# Patient Record
Sex: FEMALE | Race: WHITE | NOT HISPANIC OR LATINO | Employment: FULL TIME | URBAN - METROPOLITAN AREA
[De-identification: names, ages, dates, MRNs, and addresses within clinical notes are randomized per-mention and may not be internally consistent; named-entity substitution may affect disease eponyms.]

---

## 2017-01-20 ENCOUNTER — APPOINTMENT (OUTPATIENT)
Dept: PLASTIC SURGERY | Facility: IMAGING CENTER | Age: 47
End: 2017-01-20

## 2017-05-01 ENCOUNTER — HOSPITAL ENCOUNTER (EMERGENCY)
Facility: MEDICAL CENTER | Age: 47
End: 2017-05-01
Attending: EMERGENCY MEDICINE
Payer: COMMERCIAL

## 2017-05-01 ENCOUNTER — APPOINTMENT (OUTPATIENT)
Dept: RADIOLOGY | Facility: MEDICAL CENTER | Age: 47
End: 2017-05-01
Attending: EMERGENCY MEDICINE
Payer: COMMERCIAL

## 2017-05-01 ENCOUNTER — NON-PROVIDER VISIT (OUTPATIENT)
Dept: OCCUPATIONAL MEDICINE | Facility: CLINIC | Age: 47
End: 2017-05-01
Payer: COMMERCIAL

## 2017-05-01 VITALS
RESPIRATION RATE: 14 BRPM | SYSTOLIC BLOOD PRESSURE: 136 MMHG | DIASTOLIC BLOOD PRESSURE: 86 MMHG | BODY MASS INDEX: 30.12 KG/M2 | HEIGHT: 63 IN | HEART RATE: 100 BPM | WEIGHT: 170 LBS | OXYGEN SATURATION: 96 %

## 2017-05-01 DIAGNOSIS — S60.221A CONTUSION OF RIGHT HAND, INITIAL ENCOUNTER: ICD-10-CM

## 2017-05-01 DIAGNOSIS — Z02.1 PRE-EMPLOYMENT DRUG SCREENING: ICD-10-CM

## 2017-05-01 DIAGNOSIS — Z02.83 ENCOUNTER FOR DRUG SCREENING: ICD-10-CM

## 2017-05-01 PROCEDURE — 700102 HCHG RX REV CODE 250 W/ 637 OVERRIDE(OP): Performed by: EMERGENCY MEDICINE

## 2017-05-01 PROCEDURE — A9270 NON-COVERED ITEM OR SERVICE: HCPCS | Performed by: EMERGENCY MEDICINE

## 2017-05-01 PROCEDURE — 80305 DRUG TEST PRSMV DIR OPT OBS: CPT | Performed by: INTERNAL MEDICINE

## 2017-05-01 PROCEDURE — 73130 X-RAY EXAM OF HAND: CPT | Mod: RT

## 2017-05-01 PROCEDURE — 99026 IN-HOSPITAL ON CALL SERVICE: CPT | Performed by: INTERNAL MEDICINE

## 2017-05-01 PROCEDURE — 82075 ASSAY OF BREATH ETHANOL: CPT | Performed by: INTERNAL MEDICINE

## 2017-05-01 PROCEDURE — 99283 EMERGENCY DEPT VISIT LOW MDM: CPT

## 2017-05-01 RX ORDER — IBUPROFEN 600 MG/1
600 TABLET ORAL ONCE
Status: COMPLETED | OUTPATIENT
Start: 2017-05-01 | End: 2017-05-01

## 2017-05-01 RX ADMIN — IBUPROFEN 600 MG: 600 TABLET, FILM COATED ORAL at 15:51

## 2017-05-01 ASSESSMENT — PAIN SCALES - GENERAL: PAINLEVEL_OUTOF10: ASSUMED PAIN PRESENT

## 2017-05-01 NOTE — LETTER
"  FORM C-4:  EMPLOYEE’S CLAIM FOR COMPENSATION/ REPORT OF INITIAL TREATMENT  EMPLOYEE’S CLAIM - PROVIDE ALL INFORMATION REQUESTED   First Name  Nancie Last Name  Tram Birthdate             Age  1970 46 y.o. 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 (if applicable)  Pushing a ventilator    How did this injury or occupational disease occur? Be specific and answer in detail. Use additional sheet if necessary)  ER Tech was pushing a bed too close to vent and we hit the doorway   If you believe that you have an occupational disease, when did you first have knowledge of the disability and it relationship to your employment?  N/A Witnesses to the Accident  Grant Jones RN     Nature of Injury or Occupational Disease  Contusion  Part(s) of Body Injured or Affected  Hand (R), N/A, N/A    I certify that the above is true and correct to the best of my knowledge and that I have provided this information in order to obtain " the benefits of [place]  I hereby authorize any physician, chiropractor, surgeon, practitioner, or other person, any hospital, including [facility] hospital, any medical service organization, any insurance company, or other institution or organization to release to each other, any medical or other information, including benefits paid or payable, pertinent to this injury or disease, except information relative to diagnosis, treatment and/or counseling for AIDS, psychological conditions, alcohol or controlled substances, for which I must give specific authorization.  A Photostat of this authorization shall be as valid as the original.   Date 05/01/2017 [facility]   Employee’s Signature   THIS REPORT MUST BE COMPLETED AND MAILED WITHIN 3 WORKING DAYS OF TREATMENT   Place  [facility], EMERGENCY DEPT  Name of Facility   [facility]   Date  5/1/2017 Diagnosis  (S60.221A) Contusion of right hand, initial encounter Is there evidence the injured employee was under the influence of alcohol and/or another controlled substance at the time of accident?   Hour  4:54 PM Description of Injury or Disease  Contusion of right hand, initial encounter No   Treatment  Motrin, ice  Have you advised the patient to remain off work five days or more?         No   X-Ray Findings  Negative  Comments:hand x-ray negative for fracture   If Yes   From Date    To Date      From information given by the employee, together with medical evidence, can you directly connect this injury or occupational disease as job incurred?  Yes If No, is the employee capable of: Full Duty  No Modified Duty  Yes   Is additional medical care by a physician indicated?  Yes If Modified Duty, Specify any Limitations / Restrictions  No lifting greater than 5 pounds right hand     Do you know  "of any previous injury or disease contributing to this condition or occupational disease?  No   Date  5/1/2017 Print Doctor’s Name  Joel Nielsen certify the employer’s copy of this form was mailed on:   Address  [address] [address] [phone] Insurer’s Use Only   [facility]  [address]    Provider’s Tax ID Number  [id number] Telephone  Dept: [phone]    Doctor’s Signature  e-JOEL Martell D.O. Degree  M.D.    Original - TREATING PHYSICIAN OR CHIROPRACTOR   Pg 2-Insurer/TPA   Pg 3-Employer   Pg 4-Employee                                                                                                  Form C-4 (rev01/03)     BRIEF DESCRIPTION OF RIGHTS AND BENEFITS  (Pursuant to [place] 616C.[place])    Notice of Injury or Occupational Disease (Incident Report Form C-1): If an injury or occupational disease (OD) arises out of and in the course of employment, you must provide written notice to your employer as soon as practicable, but no later than 7 days after the accident or OD. Your employer shall maintain a sufficient supply of the required forms.    Claim for Compensation (Form C-4): If medical treatment is sought, the form C-4 is available at the place of initial treatment. A completed \"Claim for Compensation\" (Form C-4) must be filed within 90 days after an accident or OD. The treating physician or chiropractor must, within 3 working days after treatment, complete and mail to the employer, the employer's insurer and third-party , the Claim for Compensation.    Medical Treatment: If you require medical treatment for your on-the-job injury or OD, you may be required to select a physician or chiropractor from a list provided by your workers’ compensation insurer, if it has contracted with an Organization for Managed Care (MCO) or Preferred Provider Organization (PPO) or providers of health care. If your employer has not entered into a contract " with an MCO or PPO, you may select a physician or chiropractor from the Panel of Physicians and Chiropractors. Any medical costs related to your industrial injury or OD will be paid by your insurer.    Temporary Total Disability (TTD): If your doctor has certified that you are unable to work for a period of at least 5 consecutive days, or 5 cumulative days in a 20-day period, or places restrictions on you that your employer does not accommodate, you may be entitled to TTD compensation.    Temporary Partial Disability (TPD): If the wage you receive upon reemployment is less than the compensation for TTD to which you are entitled, the insurer may be required to pay you TPD compensation to make up the difference. TPD can only be paid for a maximum of 24 months.    Permanent Partial Disability (PPD): When your medical condition is stable and there is an indication of a PPD as a result of your injury or OD, within 30 days, your insurer must arrange for an evaluation by a rating physician or chiropractor to determine the degree of your PPD. The amount of your PPD award depends on the date of injury, the results of the PPD evaluation and your age and wage.    Permanent Total Disability (PTD): If you are medically certified by a treating physician or chiropractor as permanently and totally disabled and have been granted a PTD status by your insurer, you are entitled to receive monthly benefits not to exceed 66 2/3% of your average monthly wage. The amount of your PTD payments is subject to reduction if you previously received a PPD award.    Vocational Rehabilitation Services: You may be eligible for vocational rehabilitation services if you are unable to return to the job due to a permanent physical impairment or permanent restrictions as a result of your injury or occupational disease.    Transportation and Per Andrea Reimbursement: You may be eligible for travel expenses and per andrea associated with medical  treatment.  Reopening: You may be able to reopen your claim if your condition worsens after claim closure.    Appeal Process: If you disagree with a written determination issued by the insurer or the insurer does not respond to your request, you may appeal to the Department of Administration, , by following the instructions contained in your determination letter. You must appeal the determination within 70 days from the date of the determination letter at [address], [address], [place], [place] [address], or [address], [place], [place] [address]. If you disagree with the  decision, you may appeal to the Department of Administration, . You must file your appeal within 30 days from the date of the  decision letter at [address], [address], [place], [place] [address], or [address], [place], [place] [address]. If you disagree with a decision of an , you may file a petition for judicial review with the District Court. You must do so within 30 days of the Appeal Officer’s decision. You may be represented by an  at your own expense or you may contact the [facility] for possible representation.    [place]  for Injured Workers (NAIW): If you disagree with a  decision, you may request that NAIW represent you without charge at an  Hearing. For information regarding denial of benefits, you may contact the [facility] at: [address], [address], [place] [address], [phone]) [phone], or [address], [address], [place] [address], [phone]    To File a Complaint with the Division: If you wish to file a complaint with the  of the Division of Industrial Relations (DIR), please contact the Workers’ Compensation Section, [address], [address], [place], [place] [address], telephone (223) 174-9231, or [address], [address], [place], [place] [address], telephone (974) 186-4348.    For assistance with Workers’ Compensation Issues: you may contact the Office of the Governor Consumer Health Assistance, [address] [place] [address], [address], [place], [place] [address], Toll Free 1-631.980.6246, Web site: http://Netshow.me.[facility].nv., E-mail [email or web address].[email or web address].                                                                                                                                                                               __________________________________________________________________                                    _________________            Employee Name / Signature                                                                                                                            Date                                       D-2 (rev. 10/07)

## 2017-05-01 NOTE — ED PROVIDER NOTES
ED Provider Note    Scribed for Seamus Nielsen D.O. by Octavio Teran. 5/1/2017  3:08 PM    Primary care provider: JANE Bueno  Means of arrival: Private vehicle  History obtained from: Patient  History limited by: None    CHIEF COMPLAINT  Chief Complaint   Patient presents with   • T-5000     hand injury     HPI  Nancie Ugalde is a 46 y.o. female who presents to the Emergency Department complaining of constant hand pain after jamming her hand between a wall and a hospital bed onset 40 minutes prior to arrival. The patient has associated bruising. She denies any loss of sensation. This is a work related injury and the patient works here. Patient had a bag of ice at bedside.    REVIEW OF SYSTEMS  Pertinent positives include right hand pain, bruising, and trauma. Pertinent negatives include no loss of sensation.    E    PAST MEDICAL HISTORY  Past Medical History   Diagnosis Date   • Hypertension    • Indigestion    • Asthma    • Disorder of thyroid    • Gynecological disorder      heavy periods     SURGICAL HISTORY  Past Surgical History   Procedure Laterality Date   • Other abdominal surgery       gallbladder, appendix   • Gyn surgery     • Tubal ligation     • Hysterectomy laparoscopy Bilateral 9/21/2016     Procedure: HYSTERECTOMY LAPAROSCOPY bilateral salpingectomy;  Surgeon: Nba Benitez M.D.;  Location: SURGERY SAME DAY [facility] ORS;  Service:    • Vaginal suspension N/A 9/21/2016     Procedure: UTEROSACRAL VAULT SUSPENSION;  Surgeon: Nba Benitez M.D.;  Location: SURGERY SAME DAY [facility] ORS;  Service:    • Cystoscopy N/A 9/21/2016     Procedure: CYSTOSCOPY;  Surgeon: Nba Benitez M.D.;  Location: SURGERY SAME DAY [facility] ORS;  Service:       SOCIAL HISTORY  Social History   Substance Use Topics   • Smoking status: Former Smoker   • Smokeless tobacco: Never Used   • Alcohol Use: Yes      Comment: 2-3 per week      History   Drug Use No     FAMILY HISTORY  No  "pertinent family history noted.    CURRENT MEDICATIONS  Home Medications     Reviewed by Dorita Gutierrez R.N. (Registered Nurse) on 05/01/17 at 1454  Med List Status: Complete    Medication Last Dose Status    ibuprofen (MOTRIN) 600 MG Tab 9/20/2016 Active    losartan (COZAAR) 50 MG Tab 9/20/2016 Active    thyroid (ARMOUR THYROID) 60 MG Tab 9/20/2016 Active              ALLERGIES  Allergies   Allergen Reactions   • Nkda [No Known Drug Allergy]      PHYSICAL EXAM  VITAL SIGNS: /86 mmHg  Pulse 100  Resp 14  Ht 1.6 m (5' 3\")  Wt 77.111 kg (170 lb)  BMI 30.12 kg/m2  SpO2 96%    Skin: Warm, Dry, No erythema, No rash. 2-3 mm ecchymotic lesion to dorsum of second metacarpal of right hand.   Extremities: No edema, No evidence of deformity, Full range of motion, 2-3 mm ecchymotic lesion to dorsum of second metacarpal of right hand.  Neurologic: Alert & oriented x 3,  No focal deficits noted, normal gait. Radial/ulnar and median nerve intact. Strength and sensation intact.    DIAGNOSTIC STUDIES/PROCEDURES    RADIOLOGY  DX-HAND 3+ RIGHT   Final Result         No acute osseous abnormality.        The radiologist's interpretation of all radiological studies have been reviewed by me.    COURSE & MEDICAL DECISION MAKING  Nursing notes, VS, PMSFHx reviewed in chart.    3:08 PM - Patient seen and examined at bedside. She agrees to imaging and use of Motrin for pain management. Patient will be treated with Motrin tablet 600 mg. Ordered DX hand 3+ right to evaluate her symptoms.    5:10 PM Review of imaging reveals no acute abnormality.    5:10 PM - Re-examined; The patient is resting in bed comfortably. I discussed her above findings were overall unremarkable and plans for discharge with an information sheet on hand injuries. She was given a referral to [facility] as well as Dr. Gaming, Orthopedics, and instructed to return to the ED if her symptoms worsen. Patient understands and agrees. Her vitals prior " "to discharge are: /86 mmHg  Pulse 100  Resp 14  Ht 1.6 m (5' 3\")  Wt 77.111 kg (170 lb)  BMI 30.12 kg/m2  SpO2 96%    This is a charming 46 y.o. female that presents with right hand contusion. X-rays completed for possible fracture is negative. This point, the patient has no evidence of fracture although I cannot really exclude occult fracture. For this reason she is to follow-up with Workmen's Compensation as well as Dr. Gaming for further evaluation and management if she continues to have pain in one week. Strict return precautions have been given.    DISPOSITION:  Patient will be discharged home in stable condition.    FOLLOW UP:  [facility]  [address] [facility] [address] [facility] [address] [address]  [phone]    Schedule an appointment as soon as possible for a visit in 1 day      [facility], Emergency Dept  [address] [id number] [phone]    If symptoms worsen    Forest Gaming M.D.  [address]  [address]  [place] [address]  [phone]    Schedule an appointment as soon as possible for a visit in 1 week  As needed    FINAL IMPRESSION  1. Contusion of right hand, initial encounter        IOctavio (Scribe), am scribing for, and in the presence of, Seamus Nielsen D.O.    Electronically signed by: Octavio Teran (Scribe), 5/1/2017    ISeamus D.O. personally performed the services described in this documentation, as scribed by Octavio Teran in my presence, and it is both accurate and complete.    The note accurately reflects work and decisions made by me.  Seamus Nielsen  5/1/2017  6:36 PM                "

## 2017-05-01 NOTE — ED AVS SNAPSHOT
Home Care Instructions                                                                                                                Nancie Ugalde   MRN: [id number]    Department:  [facility], Emergency Dept   Date of Visit:  5/1/2017            [facility], Emergency Dept    [address] [place] [address]    Phone:  [phone]      You were seen by     Seamus Nielsen D.O.      Your Diagnosis Was     Contusion of right hand, initial encounter     S60.221A       These are the medications you received during your hospitalization from 05/01/2017 1445 to 05/01/2017 1705     Date/Time Order Dose Route Action    05/01/2017 1551 ibuprofen (MOTRIN) tablet 600 mg 600 mg Oral Given      Follow-up Information     1. Follow up with [facility]. Schedule an appointment as soon as possible for a visit in 1 day.    Contact information    [address] [facility] [address] [phone]          2. Follow up with [facility], Emergency Dept.    Specialty:  Emergency Medicine    Why:  If symptoms worsen    Contact information    [address] [facility] [address] [phone]        3. Follow up with [employer] M.D.. Schedule an appointment as soon as possible for a visit in 1 week.    Specialty:  Orthopaedics    Why:  As needed    Contact information    [address]  [address]  [facility] [address] [phone]        Medication Information     Review all of your home medications and newly ordered medications with your primary doctor and/or pharmacist as soon as possible. Follow medication instructions as directed by your doctor and/or pharmacist.     Please keep your complete medication list with you and share with your physician. Update the information when medications are discontinued, doses are changed, or new medications (including over-the-counter products) are added; and carry medication information at all times in the event of  emergency situations.               Medication List      ASK your doctor about these medications        Instructions    Morning Afternoon Evening Bedtime    ARMOUR THYROID 60 MG Tabs   Generic drug:  thyroid        Take 60 mg by mouth every day.   Dose:  60 mg                        ibuprofen 600 MG Tabs   Last time this was given:  600 mg on 5/1/2017  3:51 PM   Commonly known as:  MOTRIN        Take 1 Tab by mouth every 6 hours as needed.   Dose:  600 mg                        losartan 50 MG Tabs   Commonly known as:  COZAAR        Take 50 mg by mouth every day.   Dose:  50 mg                                Procedures and tests performed during your visit     DX-HAND 3+ RIGHT        Discharge Instructions       Hand Contusion  A hand contusion is a deep bruise on your hand area. Contusions are the result of an injury that caused bleeding under the skin. The contusion may turn blue, purple, or yellow. Minor injuries will give you a painless contusion, but more severe contusions may stay painful and swollen for a few weeks.  CAUSES   A contusion is usually caused by a blow, trauma, or direct force to an area of the body.  SYMPTOMS   · Swelling and redness of the injured area.  · Discoloration of the injured area.  · Tenderness and soreness of the injured area.  · Pain.  DIAGNOSIS   The diagnosis can be made by taking a history and performing a physical exam. An X-ray, CT scan, or MRI may be needed to determine if there were any associated injuries, such as broken bones (fractures).  TREATMENT   Often, the best treatment for a hand contusion is resting, elevating, icing, and applying cold compresses to the injured area. Over-the-counter medicines may also be recommended for pain control.  HOME CARE INSTRUCTIONS   · Put ice on the injured area.  ¨ Put ice in a plastic bag.  ¨ Place a towel between your skin and the bag.  ¨ Leave the ice on for 15-20 minutes, 03-04 times a day.  · Only take over-the-counter or  prescription medicines as directed by your caregiver. Your caregiver may recommend avoiding anti-inflammatory medicines (aspirin, ibuprofen, and naproxen) for 48 hours because these medicines may increase bruising.  · If told, use an elastic wrap as directed. This can help reduce swelling. You may remove the wrap for sleeping, showering, and bathing. If your fingers become numb, cold, or blue, take the wrap off and reapply it more loosely.  · Elevate your hand with pillows to reduce swelling.  · Avoid overusing your hand if it is painful.  SEEK IMMEDIATE MEDICAL CARE IF:   · You have increased redness, swelling, or pain in your hand.  · Your swelling or pain is not relieved with medicines.  · You have loss of feeling in your hand or are unable to move your fingers.  · Your hand turns cold or blue.  · You have pain when you move your fingers.  · Your hand becomes warm to the touch.  · Your contusion does not improve in 2 days.  MAKE SURE YOU:   · Understand these instructions.  · Will watch your condition.  · Will get help right away if you are not doing well or get worse.     This information is not intended to replace advice given to you by your health care provider. Make sure you discuss any questions you have with your health care provider.     Document Released: 06/09/2003 Document Revised: 09/11/2013 Document Reviewed: 06/10/2013  Elsevier Interactive Patient Education ©2016 Elsevier Inc.            Patient Information     Patient Information    Following emergency treatment: all patient requiring follow-up care must return either to a private physician or a clinic if your condition worsens before you are able to obtain further medical attention, please return to the emergency room.     Billing Information    At [facility], we work to make the billing process streamlined for our patients.  Our Representatives are here to answer any questions you may have regarding your hospital bill.  If you have insurance  coverage and have supplied your insurance information to us, we will submit a claim to your insurer on your behalf.  Should you have any questions regarding your bill, we can be reached online or by phone as follows:  Online: You are able pay your bills online or live chat with our representatives about any billing questions you may have. We are here to help Monday - Friday from 8:00am to 7:30pm and 9:00am - 12:00pm on Saturdays.  Please visit [email or web address]  for more information.   Phone:  [phone] or [phone]    Please note that your emergency physician, surgeon, pathologist, radiologist, anesthesiologist, and other specialists are not employed by [facility] and will therefore bill separately for their services.  Please contact them directly for any questions concerning their bills at the numbers below:     Emergency Physician Services:  [phone]  [facility]:  [phone]  [facility]:  [phone]  [facility]:  [phone]    1. Your final bill may vary from the amount quoted upon discharge if all procedures are not complete at that time, or if your doctor has additional procedures of which we are not aware. You will receive an additional bill if you return to the Emergency Department at [facility] for suture removal regardless of the facility of which the sutures were placed.     2. Please arrange for settlement of this account at the emergency registration.    3. All self-pay accounts are due in full at the time of treatment.  If you are unable to meet this obligation then payment is expected within 4-5 days.     4. If you have had radiology studies (CT, X-ray, Ultrasound, MRI), you have received a preliminary result during your emergency department visit. Please contact the radiology department [phone] to receive a copy of your final result. Please discuss the Final result with your primary  physician or with the follow up physician provided.     Crisis Hotline:  [place] Crisis Hotline:  0-125-PLKIDDH or 1-973.403.5506  [place] Crisis Hotline:    1-641.917.7322 or 429-879-3655         ED Discharge Follow Up Questions    1. In order to provide you with very good care, we would like to follow up with a phone call in the next few days.  May we have your permission to contact you?     YES /  NO    2. What is the best phone number to call you? (       )_____-__________    3. What is the best time to call you?      Morning  /  Afternoon  /  Evening                   Patient Signature:  ____________________________________________________________    Date:  ____________________________________________________________      Your appointments     May 03, 2017  6:00 AM   Adult Draw/Collection with LAB [facility]   [facility] - [facility] (--)    [address]  [facility] [address]   [phone]            Jul 06, 2017  8:00 AM   Pulmonary Function Test with PFT-RM3   [facility] (--)    [address] [place] [address] [address]  [facility] [id number] [phone]            Jul 06, 2017  9:00 AM   XRAY 15 with PULMONARY DX 1   [facility] - Pulmonary ([address])    [address] [place] [id number]               Jul 06, 2017 10:20 AM   New Patient Pulmonary with Forest Sorensen M.D.   [facility] (--)    [address] [place] [address]  [facility] [id number] [phone]

## 2017-05-01 NOTE — ED AVS SNAPSHOT
[employer] Access Code: Activation code not generated  Current [employer] Status: Active    [employer]  A secure, online tool to manage your health information     [employer]’s [employer]® is a secure, online tool that connects you to your personalized health information from the privacy of your home -- day or night - making it very easy for you to manage your healthcare. Once the activation process is completed, you can even access your medical information using the [employer] jacob, which is available for free in the Apple Jacob store or Google Play store.     [employer] provides the following levels of access (as shown below):   My Chart Features   [facility] Primary Care Doctor [facility]  Specialists [facility]  Urgent  Care Non-[facility]  Primary Care  Doctor   Email your healthcare team securely and privately 24/7 X X X X   Manage appointments: schedule your next appointment; view details of past/upcoming appointments X      Request prescription refills. X      View recent personal medical records, including lab and immunizations X X X X   View health record, including health history, allergies, medications X X X X   Read reports about your outpatient visits, procedures, consult and ER notes X X X X   See your discharge summary, which is a recap of your hospital and/or ER visit that includes your diagnosis, lab results, and care plan. X X       How to register for [employer]:  1. Go to  [email or web address].  2. Click on the Sign Up Now box, which takes you to the New Member Sign Up page. You will need to provide the following information:  a. Enter your [employer] Access Code exactly as it appears at the top of this page. (You will not need to use this code after you’ve completed the sign-up process. If you do not sign up before the expiration date, you must request a new code.)   b. Enter your date of birth.   c. Enter your home email address.   d. Click Submit, and follow the next screen’s instructions.  3. Create a [employer] ID. This will  be your [employer] login ID and cannot be changed, so think of one that is secure and easy to remember.  4. Create a [employer] password. You can change your password at any time.  5. Enter your Password Reset Question and Answer. This can be used at a later time if you forget your password.   6. Enter your e-mail address. This allows you to receive e-mail notifications when new information is available in [employer].  7. Click Sign Up. You can now view your health information.    For assistance activating your [employer] account, call [phone]

## 2017-05-01 NOTE — LETTER
"  FORM C-4:  EMPLOYEE’S CLAIM FOR COMPENSATION/ REPORT OF INITIAL TREATMENT  EMPLOYEE’S CLAIM - PROVIDE ALL INFORMATION REQUESTED   First Name  Nancie Last Name  Tram Birthdate             Age  1970 46 y.o. Sex  female Claim Number   Home Employee Address  [address]  [facility] [place]                                     Zip  [address] Height  1.6 m (5' 3\") Weight  77.111 kg (170 lb) [facility]  [id number]   Mailing Employee Address                           [address]   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 (if applicable)  Pushing a ventilator    How did this injury or occupational disease occur? Be specific and answer in detail. Use additional sheet if necessary)  ER Tech was pushing a bed too close to vent and we hit the doorway   If you believe that you have an occupational disease, when did you first have knowledge of the disability and it relationship to your employment?  N/A Witnesses to the Accident  Grant Jones RN     Nature of Injury or Occupational Disease  Contusion  Part(s) of Body Injured or Affected  Hand (R), N/A, N/A    I certify that the above is true and correct to the best of my knowledge and that I have provided this information in order to obtain the benefits of [place] " [place] or [place]).  I hereby authorize any physician, chiropractor, surgeon, practitioner, or other person, any hospital, including [facility] hospital, any medical service organization, any insurance company, or other institution or organization to release to each other, any medical or other information, including benefits paid or payable, pertinent to this injury or disease, except information relative to diagnosis, treatment and/or counseling for AIDS, psychological conditions, alcohol or controlled substances, for which I must give specific authorization.  A Photostat of this authorization shall be as valid as the original.   Date Place   Employee’s Signature   THIS REPORT MUST BE COMPLETED AND MAILED WITHIN 3 WORKING DAYS OF TREATMENT   Place  [facility], EMERGENCY DEPT  Name of Facility   [facility]   Date  5/1/2017 Diagnosis  No diagnosis found. Is there evidence the injured employee was under the influence of alcohol and/or another controlled substance at the time of accident?   Hour  4:28 PM Description of Injury or Disease       Treatment     Have you advised the patient to remain off work five days or more?             X-Ray Findings      If Yes   From Date    To Date      From information given by the employee, together with medical evidence, can you directly connect this injury or occupational disease as job incurred?    If No, is the employee capable of: Full Duty    Modified Duty      Is additional medical care by a physician indicated?    If Modified Duty, Specify any Limitations / Restrictions        Do you know of any previous injury or disease contributing to this condition or occupational disease?      Date  5/1/2017 Print Doctor’s Name  Seamus Nielsen I certify the employer’s copy of this form was mailed on:   Address  [address] [place] [address]  "[id number]  [phone] Insurer’s Use Only   [facility] Zip  [address]    Provider’s Tax ID Number  [id number] Telephone  Dept: [phone]    Doctor’s Signature    Degree       Original - TREATING PHYSICIAN OR CHIROPRACTOR   Pg 2-Insurer/TPA   Pg 3-Employer   Pg 4-Employee                                                                                                  Form C-4 (rev01/03)     BRIEF DESCRIPTION OF RIGHTS AND BENEFITS  (Pursuant to [place] 616[place])    Notice of Injury or Occupational Disease (Incident Report Form C-1): If an injury or occupational disease (OD) arises out of and in the course of employment, you must provide written notice to your employer as soon as practicable, but no later than 7 days after the accident or OD. Your employer shall maintain a sufficient supply of the required forms.    Claim for Compensation (Form C-4): If medical treatment is sought, the form C-4 is available at the place of initial treatment. A completed \"Claim for Compensation\" (Form C-4) must be filed within 90 days after an accident or OD. The treating physician or chiropractor must, within 3 working days after treatment, complete and mail to the employer, the employer's insurer and third-party , the Claim for Compensation.    Medical Treatment: If you require medical treatment for your on-the-job injury or OD, you may be required to select a physician or chiropractor from a list provided by your workers’ compensation insurer, if it has contracted with an Organization for Managed Care (MCO) or Preferred Provider Organization (PPO) or providers of health care. If your employer has not entered into a contract with an MCO or PPO, you may select a physician or chiropractor from the Panel of Physicians and Chiropractors. Any medical costs related to your industrial injury or OD will be paid by your insurer.    Temporary Total Disability (TTD): If your doctor has certified that you are unable " to work for a period of at least 5 consecutive days, or 5 cumulative days in a 20-day period, or places restrictions on you that your employer does not accommodate, you may be entitled to TTD compensation.    Temporary Partial Disability (TPD): If the wage you receive upon reemployment is less than the compensation for TTD to which you are entitled, the insurer may be required to pay you TPD compensation to make up the difference. TPD can only be paid for a maximum of 24 months.    Permanent Partial Disability (PPD): When your medical condition is stable and there is an indication of a PPD as a result of your injury or OD, within 30 days, your insurer must arrange for an evaluation by a rating physician or chiropractor to determine the degree of your PPD. The amount of your PPD award depends on the date of injury, the results of the PPD evaluation and your age and wage.    Permanent Total Disability (PTD): If you are medically certified by a treating physician or chiropractor as permanently and totally disabled and have been granted a PTD status by your insurer, you are entitled to receive monthly benefits not to exceed 66 2/3% of your average monthly wage. The amount of your PTD payments is subject to reduction if you previously received a PPD award.    Vocational Rehabilitation Services: You may be eligible for vocational rehabilitation services if you are unable to return to the job due to a permanent physical impairment or permanent restrictions as a result of your injury or occupational disease.    Transportation and Per Andrea Reimbursement: You may be eligible for travel expenses and per andrea associated with medical treatment.  Reopening: You may be able to reopen your claim if your condition worsens after claim closure.    Appeal Process: If you disagree with a written determination issued by the insurer or the insurer does not respond to your request, you may appeal to the Department of Administration,  , by following the instructions contained in your determination letter. You must appeal the determination within 70 days from the date of the determination letter at [address], [address], [place], [place] [address], or [address], [address], [place], [place] [address]. If you disagree with the  decision, you may appeal to the Department of Administration, . You must file your appeal within 30 days from the date of the  decision letter at [address], [address], [place], [place] [address], or [address], [address], [place], [place] [address]. If you disagree with a decision of an , you may file a petition for judicial review with the District Court. You must do so within 30 days of the Appeal Officer’s decision. You may be represented by an  at your own expense or you may contact the [facility] for possible representation.    [place]  for Injured Workers (NAIW): If you disagree with a  decision, you may request that NAIW represent you without charge at an  Hearing. For information regarding denial of benefits, you may contact the [facility] at: [address], [address], [place] [address], [phone], or [address], [place] [address], [phone]    To File a Complaint with the Division: If you wish to file a complaint with the  of the Division of Industrial Relations (DIR), please contact the Workers’ Compensation Section, [address], [address], [place], [place] [address], telephone [phone], or [address], [facility] [address], [place] [address], telephone [phone].    For assistance with Workers’ Compensation Issues: you may contact the Office of the Governor Consumer Health Assistance, [address], [address], [place], [place] [address], Toll Free 1-326.700.6260, Web  site: http://rosalinda..nv.us, E-mail negin@.nv.                                                                                                                                                                               __________________________________________________________________                                    _________________            Employee Name / Signature                                                                                                                            Date                                       D-2 (rev. 10/07)

## 2017-05-01 NOTE — ED AVS SNAPSHOT
5/1/2017    Nancie Ugalde  [address] [place] [address]    Dear Nancie:    [facility] wants to ensure your discharge home is safe and you or your loved ones have had all of your questions answered regarding your care after you leave the hospital.    Below is a list of resources and contact information should you have any questions regarding your hospital stay, follow-up instructions, or active medical symptoms.    Questions or Concerns Regarding… Contact   Medical Questions Related to Your Discharge  (7 days a week, 8am-5pm) Contact a Nurse Care Coordinator   [phone]   Medical Questions Not Related to Your Discharge  (24 hours a day / 7 days a week)  Contact the Nurse Health Line   [phone]    Medications or Discharge Instructions Refer to your discharge packet   or contact your [facility] Primary Care Provider   [phone]   Follow-up Appointment(s) Schedule your appointment via Gamerius   or contact Scheduling [phone]   Billing Review your statement via Gamerius  or contact Billing [phone]   Medical Records Review your records via Gamerius   or contact Medical Records [phone]     You may receive a telephone call within two days of discharge. This call is to make certain you understand your discharge instructions and have the opportunity to have any questions answered. You can also easily access your medical information, test results and upcoming appointments via the Gamerius free online health management tool. You can learn more and sign up at [email or web address]. For assistance setting up your Gamerius account, please call [phone].    Once again, we want to ensure your discharge home is safe and that you have a clear understanding of any next steps in your care. If you have any questions or concerns, please do not hesitate to contact us, we are here for you. Thank you for choosing [facility] for your healthcare needs.    Sincerely,    Your [facility] Healthcare Team

## 2017-05-01 NOTE — DISCHARGE INSTRUCTIONS
Hand Contusion  A hand contusion is a deep bruise on your hand area. Contusions are the result of an injury that caused bleeding under the skin. The contusion may turn blue, purple, or yellow. Minor injuries will give you a painless contusion, but more severe contusions may stay painful and swollen for a few weeks.  CAUSES   A contusion is usually caused by a blow, trauma, or direct force to an area of the body.  SYMPTOMS   · Swelling and redness of the injured area.  · Discoloration of the injured area.  · Tenderness and soreness of the injured area.  · Pain.  DIAGNOSIS   The diagnosis can be made by taking a history and performing a physical exam. An X-ray, CT scan, or MRI may be needed to determine if there were any associated injuries, such as broken bones (fractures).  TREATMENT   Often, the best treatment for a hand contusion is resting, elevating, icing, and applying cold compresses to the injured area. Over-the-counter medicines may also be recommended for pain control.  HOME CARE INSTRUCTIONS   · Put ice on the injured area.  ¨ Put ice in a plastic bag.  ¨ Place a towel between your skin and the bag.  ¨ Leave the ice on for 15-20 minutes, 03-04 times a day.  · Only take over-the-counter or prescription medicines as directed by your caregiver. Your caregiver may recommend avoiding anti-inflammatory medicines (aspirin, ibuprofen, and naproxen) for 48 hours because these medicines may increase bruising.  · If told, use an elastic wrap as directed. This can help reduce swelling. You may remove the wrap for sleeping, showering, and bathing. If your fingers become numb, cold, or blue, take the wrap off and reapply it more loosely.  · Elevate your hand with pillows to reduce swelling.  · Avoid overusing your hand if it is painful.  SEEK IMMEDIATE MEDICAL CARE IF:   · You have increased redness, swelling, or pain in your hand.  · Your swelling or pain is not relieved with medicines.  · You have loss of feeling in  your hand or are unable to move your fingers.  · Your hand turns cold or blue.  · You have pain when you move your fingers.  · Your hand becomes warm to the touch.  · Your contusion does not improve in 2 days.  MAKE SURE YOU:   · Understand these instructions.  · Will watch your condition.  · Will get help right away if you are not doing well or get worse.     This information is not intended to replace advice given to you by your health care provider. Make sure you discuss any questions you have with your health care provider.     Document Released: 06/09/2003 Document Revised: 09/11/2013 Document Reviewed: 06/10/2013  YeahMobi Interactive Patient Education ©2016 [employer]

## 2017-05-02 NOTE — ED NOTES
Pt discharged to home. Pt understands written and verbal d/c instructions.  Prescriptions given.  Pt to follow up with Dr. Gaming as needed.  Pt verbalized understanding.

## 2017-05-10 ENCOUNTER — TELEPHONE (OUTPATIENT)
Dept: PULMONOLOGY | Facility: HOSPICE | Age: 47
End: 2017-05-10

## 2017-05-10 DIAGNOSIS — R06.00 DYSPNEA, UNSPECIFIED TYPE: ICD-10-CM

## 2017-05-15 ENCOUNTER — HOSPITAL ENCOUNTER (OUTPATIENT)
Dept: LAB | Facility: MEDICAL CENTER | Age: 47
End: 2017-05-15
Attending: GENERAL PRACTICE
Payer: COMMERCIAL

## 2017-05-15 LAB
ALBUMIN SERPL BCP-MCNC: 4.1 G/DL (ref 3.2–4.9)
ALBUMIN/GLOB SERPL: 1.2 G/DL
ALP SERPL-CCNC: 54 U/L (ref 30–99)
ALT SERPL-CCNC: 12 U/L (ref 2–50)
ANION GAP SERPL CALC-SCNC: 6 MMOL/L (ref 0–11.9)
APPEARANCE UR: ABNORMAL
AST SERPL-CCNC: 14 U/L (ref 12–45)
BACTERIA #/AREA URNS HPF: ABNORMAL /HPF
BASOPHILS # BLD AUTO: 1 % (ref 0–1.8)
BASOPHILS # BLD: 0.09 K/UL (ref 0–0.12)
BILIRUB SERPL-MCNC: 0.6 MG/DL (ref 0.1–1.5)
BILIRUB UR QL STRIP.AUTO: NEGATIVE
BUN SERPL-MCNC: 9 MG/DL (ref 8–22)
CALCIUM SERPL-MCNC: 9.6 MG/DL (ref 8.5–10.5)
CHLORIDE SERPL-SCNC: 105 MMOL/L (ref 96–112)
CHOLEST SERPL-MCNC: 164 MG/DL (ref 100–199)
CO2 SERPL-SCNC: 24 MMOL/L (ref 20–33)
COLOR UR: ABNORMAL
CREAT SERPL-MCNC: 0.77 MG/DL (ref 0.5–1.4)
CULTURE IF INDICATED INDCX: NO UA CULTURE
EOSINOPHIL # BLD AUTO: 0.24 K/UL (ref 0–0.51)
EOSINOPHIL NFR BLD: 2.8 % (ref 0–6.9)
EPI CELLS #/AREA URNS HPF: ABNORMAL /HPF
ERYTHROCYTE [DISTWIDTH] IN BLOOD BY AUTOMATED COUNT: 39.8 FL (ref 35.9–50)
EST. AVERAGE GLUCOSE BLD GHB EST-MCNC: 94 MG/DL
ESTRADIOL SERPL-MCNC: 51 PG/ML
GFR SERPL CREATININE-BSD FRML MDRD: >60 ML/MIN/1.73 M 2
GLOBULIN SER CALC-MCNC: 3.4 G/DL (ref 1.9–3.5)
GLUCOSE SERPL-MCNC: 90 MG/DL (ref 65–99)
GLUCOSE UR STRIP.AUTO-MCNC: NEGATIVE MG/DL
HBA1C MFR BLD: 4.9 % (ref 0–5.6)
HCT VFR BLD AUTO: 41.2 % (ref 37–47)
HDLC SERPL-MCNC: 44 MG/DL
HGB BLD-MCNC: 14.1 G/DL (ref 12–16)
IMM GRANULOCYTES # BLD AUTO: 0.04 K/UL (ref 0–0.11)
IMM GRANULOCYTES NFR BLD AUTO: 0.5 % (ref 0–0.9)
IRON SATN MFR SERPL: 27 % (ref 15–55)
IRON SERPL-MCNC: 114 UG/DL (ref 40–170)
KETONES UR STRIP.AUTO-MCNC: NEGATIVE MG/DL
LDLC SERPL CALC-MCNC: 79 MG/DL
LEUKOCYTE ESTERASE UR QL STRIP.AUTO: NEGATIVE
LYMPHOCYTES # BLD AUTO: 2.26 K/UL (ref 1–4.8)
LYMPHOCYTES NFR BLD: 26.2 % (ref 22–41)
MCH RBC QN AUTO: 31 PG (ref 27–33)
MCHC RBC AUTO-ENTMCNC: 34.2 G/DL (ref 33.6–35)
MCV RBC AUTO: 90.5 FL (ref 81.4–97.8)
MICRO URNS: ABNORMAL
MONOCYTES # BLD AUTO: 0.58 K/UL (ref 0–0.85)
MONOCYTES NFR BLD AUTO: 6.7 % (ref 0–13.4)
MUCOUS THREADS #/AREA URNS HPF: ABNORMAL /HPF
NEUTROPHILS # BLD AUTO: 5.41 K/UL (ref 2–7.15)
NEUTROPHILS NFR BLD: 62.8 % (ref 44–72)
NITRITE UR QL STRIP.AUTO: NEGATIVE
NRBC # BLD AUTO: 0 K/UL
NRBC BLD AUTO-RTO: 0 /100 WBC
PH UR STRIP.AUTO: 5.5 [PH]
PLATELET # BLD AUTO: 330 K/UL (ref 164–446)
PMV BLD AUTO: 10.6 FL (ref 9–12.9)
POTASSIUM SERPL-SCNC: 3.9 MMOL/L (ref 3.6–5.5)
PROGEST SERPL-MCNC: 1.16 NG/ML
PROT SERPL-MCNC: 7.5 G/DL (ref 6–8.2)
PROT UR QL STRIP: NEGATIVE MG/DL
RBC # BLD AUTO: 4.55 M/UL (ref 4.2–5.4)
RBC # URNS HPF: ABNORMAL /HPF
RBC UR QL AUTO: NEGATIVE
SODIUM SERPL-SCNC: 135 MMOL/L (ref 135–145)
SP GR UR STRIP.AUTO: 1.01
T3FREE SERPL-MCNC: 3.36 PG/ML (ref 2.4–4.2)
T4 SERPL-MCNC: 8 UG/DL (ref 4–12)
TESTOST SERPL-MCNC: 43 NG/DL (ref 9–75)
TIBC SERPL-MCNC: 423 UG/DL (ref 250–450)
TRIGL SERPL-MCNC: 205 MG/DL (ref 0–149)
TSH SERPL DL<=0.005 MIU/L-ACNC: 2.56 UIU/ML (ref 0.3–3.7)
WBC # BLD AUTO: 8.6 K/UL (ref 4.8–10.8)
WBC #/AREA URNS HPF: ABNORMAL /HPF

## 2017-05-15 PROCEDURE — 83540 ASSAY OF IRON: CPT

## 2017-05-15 PROCEDURE — 84402 ASSAY OF FREE TESTOSTERONE: CPT

## 2017-05-15 PROCEDURE — 83036 HEMOGLOBIN GLYCOSYLATED A1C: CPT

## 2017-05-15 PROCEDURE — 82670 ASSAY OF TOTAL ESTRADIOL: CPT

## 2017-05-15 PROCEDURE — 80053 COMPREHEN METABOLIC PANEL: CPT

## 2017-05-15 PROCEDURE — 85025 COMPLETE CBC W/AUTO DIFF WBC: CPT

## 2017-05-15 PROCEDURE — 36415 COLL VENOUS BLD VENIPUNCTURE: CPT

## 2017-05-15 PROCEDURE — 80061 LIPID PANEL: CPT

## 2017-05-15 PROCEDURE — 84481 FREE ASSAY (FT-3): CPT

## 2017-05-15 PROCEDURE — 84144 ASSAY OF PROGESTERONE: CPT

## 2017-05-15 PROCEDURE — 82542 COL CHROMOTOGRAPHY QUAL/QUAN: CPT

## 2017-05-15 PROCEDURE — 83550 IRON BINDING TEST: CPT

## 2017-05-15 PROCEDURE — 84436 ASSAY OF TOTAL THYROXINE: CPT

## 2017-05-15 PROCEDURE — 84403 ASSAY OF TOTAL TESTOSTERONE: CPT

## 2017-05-15 PROCEDURE — 84443 ASSAY THYROID STIM HORMONE: CPT

## 2017-05-15 PROCEDURE — 81001 URINALYSIS AUTO W/SCOPE: CPT

## 2017-05-17 ENCOUNTER — OCCUPATIONAL MEDICINE (OUTPATIENT)
Dept: OCCUPATIONAL MEDICINE | Facility: CLINIC | Age: 47
End: 2017-05-17
Payer: COMMERCIAL

## 2017-05-17 VITALS
HEIGHT: 63 IN | SYSTOLIC BLOOD PRESSURE: 120 MMHG | DIASTOLIC BLOOD PRESSURE: 84 MMHG | RESPIRATION RATE: 12 BRPM | WEIGHT: 170 LBS | HEART RATE: 81 BPM | BODY MASS INDEX: 30.12 KG/M2 | TEMPERATURE: 97 F | OXYGEN SATURATION: 97 %

## 2017-05-17 DIAGNOSIS — S60.221A CONTUSION OF RIGHT HAND, INITIAL ENCOUNTER: ICD-10-CM

## 2017-05-17 LAB
AMP AMPHETAMINE: NORMAL
BAR BARBITURATES: NORMAL
BREATH ALCOHOL COMMENT: NORMAL
BZO BENZODIAZEPINES: NORMAL
COC COCAINE: NORMAL
INT CON NEG: NORMAL
INT CON POS: NORMAL
MDMA ECSTASY: NORMAL
MET METHAMPHETAMINES: NORMAL
MTD METHADONE: NORMAL
OPI OPIATES: NORMAL
OXY OXYCODONE: NORMAL
PCP PHENCYCLIDINE: NORMAL
POC BREATHALIZER: NORMAL PERCENT (ref 0–0.01)
POC URINE DRUG SCREEN OCDRS: NORMAL
THC: NORMAL

## 2017-05-17 PROCEDURE — 99202 OFFICE O/P NEW SF 15 MIN: CPT | Performed by: PREVENTIVE MEDICINE
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
## 2017-05-17 NOTE — MR AVS SNAPSHOT
"Nancie Ugalde   2017 9:20 AM   Occupational Medicine   MRN: [id number]    Department:  [facility]   Dept Phone:  [phone]    Description:  Female : 1970   Provider:  Caleb Ma D.O.           Reason for Visit     Follow-Up WC DOI 2017 - R Hand - Better - ROOM 3      Allergies as of 2017     Allergen Noted Reactions    Nkda [No Known Drug Allergy] 2007         You were diagnosed with     Contusion of right hand, initial encounter   [314695]         Vital Signs     Blood Pressure Pulse Temperature Respirations Height Weight    120/84 mmHg 81 36.1 °C (97 °F) 12 1.6 m (5' 3\") 77.111 kg (170 lb)    Body Mass Index Oxygen Saturation Smoking Status             30.12 kg/m2 97% Former Smoker         Basic Information     Date Of Birth Sex Race Ethnicity Preferred Language    1970 Female White Non- English      Your appointments     May 26, 2017 10:30 AM   Medical Spa with HAWK Chi   [facility]    [address]  [place] [address]   [phone]            2017  8:00 AM   Pulmonary Function Test with PFT-RM3   [facility] (--)    [address] [place] [address] [place] [address]   [phone]            2017  9:00 AM   XRAY 15 with PULMONARY DX 1   [facility] ([address])    [address] [place] [address] [place] [address]               2017 10:20 AM   New Patient Pulmonary with Forest Sorensen M.D.   [facility] (--)    [address] [place] [address] [address]  [place] [address]   [phone]              Problem List              ICD-10-CM Priority Class Noted - Resolved    Abnormal uterine bleeding N93.9   2016 - Present      Health Maintenance        Date Due Completion Dates    IMM DTaP/Tdap/Td Vaccine (1 - Tdap) 1989 ---    PAP SMEAR 1991 ---    MAMMOGRAM 2016, 2013, 2007, 2007            Current " Immunizations     Influenza TIV (IM) 10/9/2015, 1/16/2013    Influenza Vaccine Quad Inj (Pf) 11/1/2016    MMR Vaccine 2/25/2016    Tuberculin Skin Test 12/20/2013 12:05 PM, 1/3/2013  1:45 PM, 2/7/2011 12:20 PM      Below and/or attached are the medications your provider expects you to take. Review all of your home medications and newly ordered medications with your provider and/or pharmacist. Follow medication instructions as directed by your provider and/or pharmacist. Please keep your medication list with you and share with your provider. Update the information when medications are discontinued, doses are changed, or new medications (including over-the-counter products) are added; and carry medication information at all times in the event of emergency situations     Allergies:  NKDA - (reactions not documented)               Medications  Valid as of: May 17, 2017 -  9:42 AM    Generic Name Brand Name Tablet Size Instructions for use    Ibuprofen (Tab) MOTRIN 600 MG Take 1 Tab by mouth every 6 hours as needed.        Losartan Potassium (Tab) COZAAR 50 MG Take 50 mg by mouth every day.        Thyroid (Tab) ARMOUR THYROID 60 MG Take 60 mg by mouth every day.        .                 Medicines prescribed today were sent to:     [id number] [place] - [address] ([email or web address])    [address] [facility] [address]    Phone: [phone] Fax: [phone]    Open 24 Hours?: No      Medication refill instructions:       If your prescription bottle indicates you have medication refills left, it is not necessary to call your provider’s office. Please contact your pharmacy and they will refill your medication.    If your prescription bottle indicates you do not have any refills left, you may request refills at any time through one of the following ways: The online [email or web address] system (except Urgent Care), by calling your provider’s office, or by asking your pharmacy to contact your provider’s office with a refill request. Medication  refills are processed only during regular business hours and may not be available until the next business day. Your provider may request additional information or to have a follow-up visit with you prior to refilling your medication.   *Please Note: Medication refills are assigned a new Rx number when refilled electronically. Your pharmacy may indicate that no refills were authorized even though a new prescription for the same medication is available at the pharmacy. Please request the medicine by name with the pharmacy before contacting your provider for a refill.           JumpOffCampus Access Code: Activation code not generated  Current [employer] Status: Active
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
## 2017-05-18 LAB — TESTOST FREE SERPL-MCNC: 2.3 PG/ML (ref 1.1–5.8)

## 2017-05-19 LAB — ANDROSTANOLONE SERPL-MCNC: 64.1 PG/ML (ref 24–208)

## 2017-05-26 ENCOUNTER — APPOINTMENT (OUTPATIENT)
Dept: PLASTIC SURGERY | Facility: IMAGING CENTER | Age: 47
End: 2017-05-26

## 2017-06-16 ENCOUNTER — APPOINTMENT (OUTPATIENT)
Dept: PULMONOLOGY | Facility: HOSPICE | Age: 47
End: 2017-06-16
Payer: COMMERCIAL

## 2017-06-26 ENCOUNTER — HOSPITAL ENCOUNTER (OUTPATIENT)
Dept: LAB | Facility: MEDICAL CENTER | Age: 47
End: 2017-06-26
Payer: COMMERCIAL

## 2017-06-26 LAB
BDY FAT % MEASURED: 40.2 %
BP DIAS: 71 MMHG
BP SYS: 122 MMHG
CHOLEST SERPL-MCNC: 167 MG/DL (ref 100–199)
DIABETES HTDIA: NO
EVENT NAME HTEVT: NORMAL
FASTING HTFAS: YES
GLUCOSE SERPL-MCNC: 88 MG/DL (ref 65–99)
HDLC SERPL-MCNC: 47 MG/DL
HYPERTENSION HTHYP: YES
LDLC SERPL CALC-MCNC: 80 MG/DL
SCREENING LOC CITY HTCIT: NORMAL
SCREENING LOC STATE HTSTA: NORMAL
SCREENING LOCATION HTLOC: NORMAL
SMOKING HTSMO: NO
SUBSCRIBER ID HTSID: NORMAL
TRIGL SERPL-MCNC: 200 MG/DL (ref 0–149)

## 2017-06-26 PROCEDURE — 80061 LIPID PANEL: CPT

## 2017-06-26 PROCEDURE — 82947 ASSAY GLUCOSE BLOOD QUANT: CPT

## 2017-06-26 PROCEDURE — 36415 COLL VENOUS BLD VENIPUNCTURE: CPT

## 2017-06-26 PROCEDURE — S5190 WELLNESS ASSESSMENT BY NONPH: HCPCS

## 2017-09-27 ENCOUNTER — EMPLOYEE HEALTH (OUTPATIENT)
Dept: OCCUPATIONAL MEDICINE | Facility: CLINIC | Age: 47
End: 2017-09-27

## 2017-09-27 ENCOUNTER — EH NON-PROVIDER (OUTPATIENT)
Dept: OCCUPATIONAL MEDICINE | Facility: CLINIC | Age: 47
End: 2017-09-27

## 2017-09-27 DIAGNOSIS — Z29.89 NEED FOR ISOLATION: ICD-10-CM

## 2017-09-27 DIAGNOSIS — Z01.89 RESPIRATORY CLEARANCE EXAMINATION, ENCOUNTER FOR: ICD-10-CM

## 2017-09-27 PROCEDURE — 94010 BREATHING CAPACITY TEST: CPT | Performed by: PREVENTIVE MEDICINE

## 2017-09-27 PROCEDURE — 94375 RESPIRATORY FLOW VOLUME LOOP: CPT | Performed by: PREVENTIVE MEDICINE

## 2017-09-27 PROCEDURE — 8916 PR CLEARANCE ONLY: Performed by: PREVENTIVE MEDICINE

## 2017-10-05 ENCOUNTER — IMMUNIZATION (OUTPATIENT)
Dept: OCCUPATIONAL MEDICINE | Facility: CLINIC | Age: 47
End: 2017-10-05

## 2017-10-05 DIAGNOSIS — Z23 NEED FOR VACCINATION: ICD-10-CM

## 2017-10-05 PROCEDURE — 90686 IIV4 VACC NO PRSV 0.5 ML IM: CPT | Performed by: PREVENTIVE MEDICINE

## 2017-10-12 ENCOUNTER — HOSPITAL ENCOUNTER (OUTPATIENT)
Dept: RADIOLOGY | Facility: MEDICAL CENTER | Age: 47
End: 2017-10-12
Attending: GENERAL PRACTICE
Payer: COMMERCIAL

## 2017-10-12 DIAGNOSIS — Z00.00 ROUTINE GENERAL MEDICAL EXAMINATION AT A HEALTH CARE FACILITY: ICD-10-CM

## 2017-10-12 PROCEDURE — 306723 HCHG VASCULAR SCREENING

## 2017-10-24 ENCOUNTER — OFFICE VISIT (OUTPATIENT)
Dept: PULMONOLOGY | Facility: HOSPICE | Age: 47
End: 2017-10-24
Payer: COMMERCIAL

## 2017-10-24 ENCOUNTER — APPOINTMENT (OUTPATIENT)
Dept: RADIOLOGY | Facility: IMAGING CENTER | Age: 47
End: 2017-10-24
Payer: COMMERCIAL

## 2017-10-24 ENCOUNTER — NON-PROVIDER VISIT (OUTPATIENT)
Dept: PULMONOLOGY | Facility: HOSPICE | Age: 47
End: 2017-10-24
Payer: COMMERCIAL

## 2017-10-24 VITALS — BODY MASS INDEX: 30.12 KG/M2 | WEIGHT: 170 LBS | HEIGHT: 63 IN

## 2017-10-24 VITALS
WEIGHT: 170 LBS | HEIGHT: 63 IN | DIASTOLIC BLOOD PRESSURE: 88 MMHG | HEART RATE: 97 BPM | SYSTOLIC BLOOD PRESSURE: 136 MMHG | OXYGEN SATURATION: 97 % | RESPIRATION RATE: 16 BRPM | BODY MASS INDEX: 30.12 KG/M2

## 2017-10-24 DIAGNOSIS — G47.33 OBSTRUCTIVE SLEEP APNEA: ICD-10-CM

## 2017-10-24 DIAGNOSIS — R06.00 DYSPNEA, UNSPECIFIED TYPE: ICD-10-CM

## 2017-10-24 PROCEDURE — 94729 DIFFUSING CAPACITY: CPT | Performed by: INTERNAL MEDICINE

## 2017-10-24 PROCEDURE — 94726 PLETHYSMOGRAPHY LUNG VOLUMES: CPT | Performed by: INTERNAL MEDICINE

## 2017-10-24 PROCEDURE — 99204 OFFICE O/P NEW MOD 45 MIN: CPT | Performed by: INTERNAL MEDICINE

## 2017-10-24 PROCEDURE — 94060 EVALUATION OF WHEEZING: CPT | Performed by: INTERNAL MEDICINE

## 2017-10-24 RX ORDER — LISINOPRIL 40 MG/1
TABLET ORAL
COMMUNITY
Start: 2017-09-28 | End: 2018-06-15

## 2017-10-24 RX ORDER — THYROID,PORK 90 MG
TABLET ORAL
COMMUNITY
Start: 2017-10-01 | End: 2017-10-10

## 2017-10-24 RX ORDER — PANTOPRAZOLE SODIUM 40 MG/1
TABLET, DELAYED RELEASE ORAL
COMMUNITY
Start: 2017-10-01 | End: 2018-06-15 | Stop reason: SDUPTHER

## 2017-10-24 ASSESSMENT — PULMONARY FUNCTION TESTS
FEV1/FVC: 76.95
FVC: 3.09
FVC_PERCENT_PREDICTED: 88
FVC_PERCENT_PREDICTED: 88
FEV1/FVC_PERCENT_PREDICTED: 80
FEV1/FVC_PERCENT_CHANGE: 0
FVC_PREDICTED: 3.47
FEV1: 2.35
FEV1_PERCENT_CHANGE: 0
FEV1/FVC_PERCENT_PREDICTED: 97
FEV1_PERCENT_PREDICTED: 85
FEV1/FVC: 76
FEV1: 2.37
FEV1_PERCENT_CHANGE: 0
FEV1_PERCENT_PREDICTED: 84
FEV1/FVC_PERCENT_PREDICTED: 95
FVC: 3.08
FEV1_PREDICTED: 2.78

## 2017-10-24 ASSESSMENT — PATIENT HEALTH QUESTIONNAIRE - PHQ9: CLINICAL INTERPRETATION OF PHQ2 SCORE: 0

## 2017-10-24 NOTE — PATIENT INSTRUCTIONS
1. We have scheduled a sleep study to evaluate for obstructive sleep apnea  2. Recommend avoiding alcohol and sedatives and to not operate a motor vehicle while drowsy

## 2017-10-24 NOTE — PROGRESS NOTES
Nancie Ugalde is a 47 y.o. female here for obstructive sleep apnea.  Patient was referred by Dr. Boland.    History of Present Illness:    The patient is a 47-year-old female comes in for a evaluation for sleep apnea. The patient has had some mild shortness of breath with exertion but she is attributed this to some weight gain. She says she simply cannot lose weight even after trying with several different diets. She saw her primary physician who suggested that this could be related to sleep apnea. We did do pulmonary function testing today and she has normal spirometry, normal lung volumes and normal diffusion capacity. She has a history of a lung nodule but was followed for 3 years and there is no change. She is a prior smoker quitting 15 years ago. She smoked less than a pack a day for 15 years. Her mother  of COPD in her father  of lung cancer. She has no other siblings. She complains of snoring but it is not excessive. She denies waking up gasping and choking. She has had no witnessed apneas. She will have trouble staying asleep and she'll wake up and have trouble going back to sleep. She has nocturnal acid reflux. She also has to get up to go the bathroom several times during the night. She wakes up with headaches and she wakes up with sweats. During the daytime she is fatigued. She will fall sleep in sedentary situations. She denies any chest pains or pleurisy and denies any sinus congestion or postnasal drip. She has a history of hypertension and Mcneil's esophagus/gastroesophageal reflux disease and she's been told she has a hiatal hernia.    Constitutional:  Negative for fever, chills, sweats, and fatigue.  Eyes:  Negative for eye pain and visual changes.  HENT:  Negative for tinnitus and hoarse voice.  Cardiovascular:  Negative for chest pain, leg swelling, syncope and orthopnea.  Respiratory:  See HPI for pertinent negatives  Sleep: Positive for somnolence, loud snoring, sleep disturbance due to  breathing, insomnia.  Gastrointestinal:  Negative for dysphagia, nausea and abdominal pain.  Heme/lymph:  Denies easy bruising, blood clots.  Musculoskeletal:  Negative for arthralgias, sore muscles and back pain.  Skin:  Negative for rash and color change.  Neurological:  Negative for headaches, lightheadedness and weakness.  Psychiatric:  Denies depression.    Current Outpatient Prescriptions   Medication Sig Dispense Refill   • Misc Natural Products (PROGESTERONE EX) by Apply externally route.     • lisinopril (PRINIVIL, ZESTRIL) 40 MG tablet      • pantoprazole (PROTONIX) 40 MG Tablet Delayed Response      • ibuprofen (MOTRIN) 600 MG Tab Take 1 Tab by mouth every 6 hours as needed. 30 Tab 3   • losartan (COZAAR) 50 MG Tab Take 50 mg by mouth every day.     • thyroid (ARMOUR THYROID) 60 MG Tab Take 60 mg by mouth every day.       No current facility-administered medications for this visit.        Social History   Substance Use Topics   • Smoking status: Former Smoker     Packs/day: 1.00     Years: 15.00     Types: Cigarettes     Quit date: 1/1/2002   • Smokeless tobacco: Never Used   • Alcohol use Yes      Comment: 2-3 per week       Past Medical History:   Diagnosis Date   • Asthma    • Disorder of thyroid    • Gynecological disorder     heavy periods   • Hypertension    • Indigestion        Past Surgical History:   Procedure Laterality Date   • HYSTERECTOMY LAPAROSCOPY Bilateral 9/21/2016    Procedure: HYSTERECTOMY LAPAROSCOPY bilateral salpingectomy;  Surgeon: Nba Benitez M.D.;  Location: SURGERY SAME DAY [facility];  Service:    • VAGINAL SUSPENSION N/A 9/21/2016    Procedure: UTEROSACRAL VAULT SUSPENSION;  Surgeon: Nba Benitez M.D.;  Location: SURGERY SAME DAY [facility];  Service:    • CYSTOSCOPY N/A 9/21/2016    Procedure: CYSTOSCOPY;  Surgeon: Nba Benitez M.D.;  Location: SURGERY SAME DAY [facility];  Service:    • GYN SURGERY     • OTHER ABDOMINAL SURGERY      gallbladder,  "appendix   • TUBAL LIGATION         Allergies:  Nkda [no known drug allergy]    Family History   Problem Relation Age of Onset   • Lung Cancer Father        Physical Examination    Vitals:    10/24/17 1451   Height: 1.6 m (5' 3\")   Weight: 77.1 kg (170 lb)   Weight % change since last entry.: 0 %   BP: 136/88   Pulse: 97   BMI (Calculated): 30.11   Resp: 16       Physical Exam:  Constitutional:  Well developed and well nourished.  Head:  Normocephalic and atraumatic.  Nose:  Nose normal.  Mouth/Throat:  Oropharynx is clear and moist, no lesions.  Mallampati class IV  Eyes:  Conjunctivae and EOM are normal.  Pupils are equal, round, and reactive to light.  Neck:  Normal range of motion.  Supple.  No JVD. No tracheal deviation.  No thyromegally  Cardiovascular:  Normal rate, regular rhythm, normal heart sounds and intact distal pulses.  Pulmonary/Chest:  No accessory muscle use.  No wheezing, rales or rhonchi.  No dullness to percussion, tenderness or deformity.  Abdominal:  Soft.  No ascites.  No Hepatosplenomegally.  Non tender.  Musculoskeletal.  Normal range of motion.  No muscular atrophy.  Lymphadenopathy:  No cervical or supraclavicular adenopathy  Neurological:  Alert and oriented.  Cranial nerves intact.  No focal deficits  Skin:  No rashes or ulcers.  Psyciatric:  Normal mood and affect.      Assessment and Plan:  1. Obstructive sleep apnea  The patient has symptoms of trouble maintaining sleep, nocturia, voiding headaches and nocturnal sweats associated with unrefreshing sleep and daytime hypersomnolence. She has a crowded oropharynx. She has comorbid conditions to include an elevated BMI, gastroesophageal reflux disease and hypertension. I recommended a sleep study will plan to see her back when the study has been completed. We discussed treatment options such as CPAP therapy and she seems willing to proceed with this type of treatment if indicated. She was advised to avoid alcohol and sedatives and to not " operate a motor vehicle while drowsy.  - POLYSOMNOGRAPHY, 4 OR MORE; Future          Followup Return in about 6 weeks (around 12/5/2017) for follow up visit with APRN, follow up with the sleep physician.

## 2017-10-24 NOTE — PROCEDURES
Technician: JAILENE Leon    Technician Comment:  Good patient effort & cooperation.  The results of this test meet the ATS/ERS standards for acceptability & reproducibility.  Test was performed on the Leapfactor Body Plethysmograph-Elite DX system.  Predicted values were [facility]-3 for spirometry, [facility] for DLCO, ITS for Lung Volumes.  The DLCO was uncorrected for Hgb.  A bronchodilator of Ventolin HFA -2puffs via spacer administered.    Interpretation:    1.  Spirometry shows normal vital capacity and air flows and there was not a significant improvement after bronchodilator  2.  Lung volumes show normal total lung capacity  3.  Diffusion capacity is within normal limits  4.  Flow volume loops are within normal limits    Overall Impression: Normal pulmonary function testing

## 2017-11-15 ENCOUNTER — OFFICE VISIT (OUTPATIENT)
Dept: HEALTH INFORMATION MANAGEMENT | Facility: MEDICAL CENTER | Age: 47
End: 2017-11-15
Payer: COMMERCIAL

## 2017-11-15 VITALS — BODY MASS INDEX: 31.15 KG/M2 | HEIGHT: 63 IN | WEIGHT: 175.8 LBS

## 2017-11-15 VITALS
HEIGHT: 63 IN | OXYGEN SATURATION: 98 % | TEMPERATURE: 99 F | DIASTOLIC BLOOD PRESSURE: 86 MMHG | WEIGHT: 175.8 LBS | HEART RATE: 81 BPM | BODY MASS INDEX: 31.15 KG/M2 | SYSTOLIC BLOOD PRESSURE: 138 MMHG | RESPIRATION RATE: 16 BRPM

## 2017-11-15 DIAGNOSIS — R63.2 BINGE EATING: ICD-10-CM

## 2017-11-15 DIAGNOSIS — G47.9 SLEEPING DIFFICULTY: ICD-10-CM

## 2017-11-15 DIAGNOSIS — I10 ESSENTIAL HYPERTENSION: ICD-10-CM

## 2017-11-15 DIAGNOSIS — R63.5 WEIGHT GAIN, ABNORMAL: ICD-10-CM

## 2017-11-15 DIAGNOSIS — R53.82 CHRONIC FATIGUE: ICD-10-CM

## 2017-11-15 DIAGNOSIS — K21.00 GASTROESOPHAGEAL REFLUX DISEASE WITH ESOPHAGITIS: ICD-10-CM

## 2017-11-15 DIAGNOSIS — E78.1 HYPERTRIGLYCERIDEMIA: ICD-10-CM

## 2017-11-15 PROBLEM — K22.70 BARRETT'S ESOPHAGUS DETERMINED BY ENDOSCOPY: Status: ACTIVE | Noted: 2017-11-15

## 2017-11-15 PROCEDURE — 97802 MEDICAL NUTRITION INDIV IN: CPT | Performed by: DIETITIAN, REGISTERED

## 2017-11-15 PROCEDURE — 99205 OFFICE O/P NEW HI 60 MIN: CPT | Mod: 25 | Performed by: INTERNAL MEDICINE

## 2017-11-15 PROCEDURE — 93000 ELECTROCARDIOGRAM COMPLETE: CPT | Performed by: INTERNAL MEDICINE

## 2017-11-15 RX ORDER — PHENTERMINE HYDROCHLORIDE 15 MG/1
15 CAPSULE ORAL
Qty: 60 CAP | Refills: 0 | Status: SHIPPED | OUTPATIENT
Start: 2017-11-15 | End: 2017-12-13

## 2017-11-15 RX ORDER — TOPIRAMATE 50 MG/1
50 TABLET, FILM COATED ORAL 2 TIMES DAILY
Qty: 60 TAB | Refills: 0 | Status: SHIPPED
Start: 2017-11-15 | End: 2017-12-13

## 2017-11-15 ASSESSMENT — PAIN SCALES - GENERAL: PAINLEVEL: NO PAIN

## 2017-11-15 NOTE — LETTER
Medical Weight Management  Patient Consent Form For Qsymia (Phentermine/Topiramate extended-release)    I hereby authorize the physician and her staff to assist me in my weight reduction efforts. I understand that my treatment program consists of a balanced diet, a regular exercise program, instruction in behavior emmanuel?cation techniques, meeting with a registered dietician, and the use of the appetite suppressant medication Qsymia. I also understand that regular medical visits will be necessary while on the medication and that Qsymia must be used with caution and under direct supervision of the physician.    Risk of Proposed Treatment: I understand that any medical treatment may involve risks as well as the proposed bene?ts of weight loss. I understand that this authorization is given with the knowledge that the use of Qsymia involves risk. Risks of Qsymia include but are not limited to suicidal thoughts, sudden decrease in vision, glaucoma, birth defects in your unborn child if you take this medication while pregnant, metabolic acidosis, hypoglycemia, elevated creatinine.  Because Qsymia also contains phentermine, risks can include nervousness, diarrhea, constipation, sleeplessness, headache, tremor, fever, fainting, dry mouth, rash, change in libido, difficulty urinating, shortness of breath, swelling of feet or ankles, tiredness, dizziness, temporary memory loss, weakness, allergic reactions, psychological imbalances, hallucinations, stomach cramps, high blood pressure, palpitations, arrhythmias, rapid heart rate, and gall stones. Although seen only in rare cases, pulmonary hypertension, or heart valve disease may develop. These latter two conditions are serious and can be fatal. In case of serious side effects or if you become pregnant, stop taking the Qsymia and seek immediate medical assistance. In addition, Qsymia can rarely be addictive and should not be used with a history of drug dependence. I also  understand that there are certain health risks associated with remaining overweight or obese, including high blood pressure, diabetes, heart attack and heart disease, arthritis of the joints, sleep apnea, and sudden death.     I further understand that Qsymia should not be used by people who suffer from heart disease, glaucoma, history of a stroke, liver or kidney disease, those with a history of drug dependency, alcoholism, psychotic illness, uncontrolled hypertension, advanced atherosclerosis, thyroid over-activity, people who are on MAOI’s, serotonin migraine medications, or lithium.    While taking Qsymia, avoid taking the following medications: Decongestant medications  (Sudafed/Pseudoephedrine, Tylenol Sinus, Claritin D, Zyrtec D and Allegra D), Stimulant medications, high doses of caffeine, other weight loss medications, ephedrine MAO inhibitions and alcohol.    Patient Responsibility:   As the patient, I understand it is my responsibility to follow instructions carefully, and to report to the physician any significant medical problems that I think may be related to my weight control program as soon as possible. I agree to notify the physician of any medical problems that I may have or any results of labs/tests ordered and reviewed by any other physician. I further acknowledge that I enter into this program in full knowledge and understanding that no physician, provider, or staff of the weight loss physician has prior knowledge as to whether I would or would not have adverse effects due to the fact that each individual has a different biological and chemical make -up. I understand the purpose of this treatment is to assist me in my desire to decrease my body weight and to maintain this weight loss. I understand my continuing to receive Qsymia will be dependent on my progress in weight reduction and weight maintenance.  If I am female, I will need to have a monthly negative pregnancy test in order to receive  the next month’s prescription. I understand that a balanced caloric counting program combined with regular exercise without the use of Qsymia may likely prove successful if followed, even though I would be hungrier than without the suppressant.    I am also in full understanding that Qsymia will be used no longer than 3 consecutive months. After 3 months of use the medication will be discontinued.     If you and the physician agree to use the medication longer than 3 months or if your BMI has decreased below the Federal Drug Administration's recommended value you will be using the medication in an off-label manner.  Qsymia may result in lethargy or depression with abrupt discontinuation and I understand that during the program, medications will be discontinued if:    1.) I become pregnant, try to become pregnant, or suspect that I am pregnant.  2.) I develop a contraindication or serious side effect of the medication.  3.) I do not comply with medical requirements, i.e. visits, med doses, etc.  4.) I fail to lose and/or maintain weight appropriately.  5.) I have a planned surgery. Medications are to be stopped at least 2 weeks prior to any surgical procedure requiring general anesthesia.    Women Only: I understand Qsymia should not be taken during pregnancy, due to the chance of damage to the fetus. This has been explained to me fully, and I am aware of the risks involved. To the best of my knowledge, I am not pregnant. I am aware of the precautions that should be taken to avoid pregnancy while I am on the medication. If I become pregnant, I will advise both the physician and my OB/GYN immediately. In addition, Qsymia is not to be used while breast feeding.    No Guarantee:  I understand that much of the success of the program will depend on my effort, and that there is no guarantee that the program will be successful.  I understand that I will have to continue with sensible and nutritional eating habits and  regular exercise all my life, if I am able to be successful long-term.     Patient Consent/Waiver:   I have read and fully understand this document and authorize and accept the proposed care regardless of the risk. I affirm that my questions have been satisfactorily answered at this time. I realize that I should not sign this form if all items are not understood by me or if questions have not been answered to my satisfaction. I hereby release the physician and [facility] from any liability associated and connected with my participation in this weight loss program. I accept the risks as discussed above, in hopes of obtaining desired bene?cial results of weight loss treatment. I understand it is my responsibility to give the physician the name of my primary care physician where labs and/or EKG can be obtained for follow through and interpretation, if need be.     WARNING: If you have any questions as to the risks or hazards of the proposed treatment, or any questions whatsoever concerning the proposed treatment or other possible treatments, ask the physician now before signing this consent form. To conclude, by signing this document you are agreeing to the risks associated with Qsymia. You are agreeing that to be successful in your weight loss goals you must alter your lifestyle and adopt healthy eating and exercise patterns. You are agreeing that you are not pregnant or breast feeding. You are agreeing that you understand Qsymia may, in rare instances, be addictive. You are agreeing that you must notify the physician of any medical conditions current or that develop while taking Qsymia and you are agreeing that this document has been adequately explained to you and that you understand the document in its entirety.    Alternatives to treatment, and no treatment, including the risks and benefits thereof have been discussed with me.      _____________________________________  Patient / Authorized Legal  Representative      _____________________________________  Relationship to Patient (if not patient)    _____________________________________  Date/Time    Provider Declaration:   I have explained the contents of this document to the patient and have answered all the patient’s related questions. To the best of my knowledge, I feel the patient has been adequately informed concerning the bene?ts and risks associated with the use of Qsymia, the bene?ts and risks associated with alternative therapies, and the risks concerning an overweight status. After being adequately informed, the patient has consented.      ____________________________________  Physician Signature    ____________________________________  Physician Name (printed)     ____________________________________  Date/Time
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
## 2017-11-15 NOTE — PATIENT INSTRUCTIONS
One MR every am, low carb/paleo rest of day  Keep food log  Continue current exercise program with Daily Method  Monitor BP daily

## 2017-11-15 NOTE — PATIENT INSTRUCTIONS
Drink meal replacement shake for breakfast daily.  Start consuming protein only or protein with non-starchy vegetables for snacks between your meals on a consistent basis.  Start tracking your calorie intake using your favorite tracking liset - recommended My Fitness Pal.
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
## 2017-11-15 NOTE — PROGRESS NOTES
"11/15/2017 47 y.o.   Referring Provider: Can Boland M.D.       Time in/out: 1414 - 1442    Anthropometrics/Objective  Vitals:    11/15/17 1441   Weight: 79.7 kg (175 lb 12.8 oz)   Height: 1.588 m (5' 2.5\")       Body mass index is 31.64 kg/m².      Stated Goal Weight: 125#  See comprehensive patient history form for further information     Subjective:  Pt is here today for the initial screening visit for the medical weight management program.   -Currently not eating breakfast daily  -Eats very large meals d/t excessive hunger from not snacking  -Does not plan meals well at this time  -Wants to make long-term changes that will benefit her    See Medical Questionnaire for addition diet history     Nutrition Diagnosis (PES Statement)  · Obese related to excessive energy intake and inadequate energy expenditure as evidenced by BMI 31.6.     Client history:  Condition(s) associated with a diagnosis or treatment (specify) obesity    Biochemical data, medical test and procedures  Lab Results   Component Value Date/Time    HBA1C 4.9 05/15/2017 10:05 AM     Lab Results   Component Value Date/Time    CHOLSTRLTOT 167 06/26/2017 10:04 AM    LDL 80 06/26/2017 10:04 AM    HDL 47 06/26/2017 10:04 AM    TRIGLYCERIDE 200 (H) 06/26/2017 10:04 AM         Nutrition Intervention  Nutrition Prescription  Recommended Daily Kcals: 1494 Kcal based on REE (-500kcal for rapid weight loss = 1000)    Recommended Daily Protein: 75 grams/day    Meal Plan Recommendation   Recommend a LCD with 1 meal replacements per day     Comprehensive Nutrition education Instruction or training leading to in-depth nutrition related knowledge about:  Today pt was introduced to the Very low calorie diet plan. Discussed the 4 phases of the program (screening, reducing, adapting, and sustaining). Discussed ketosis and its benefits and side effects. Discussed anticipate weight loss goal. Discussed estimated calorie requirement. Reviewed the various meal plan " options. Reviewed the meal replacement items and their cost.     Monitoring & Evaluation Plan    Behavioral-Environmental:  Behavior : Review the materials provided     Food / Nutrient Intake:  Food intake: Pt will decide which meal plan option they would like to begin, once they have completed lab work.       Assessment Notes:  Nancie is very eager to improve and I explained that we will be looking to make small changes at every visit that will benefit her in the long-term.  I think that getting her to drink a meal replacement shake daily for breakfast will be a great start for her because consuming protein in the morning should help her with satiety later in the day.  She also is going to start snacking between her meals (2 snacks/day) on protein only or protein with non-starchy vegetables - snack list given to the patient to help with choices.  She is going to track her kcal intake over the next two weeks to help open her eyes to where she is overeating and how she can make some better choices overall.  We will go over this together at her next appointment and will also discuss how she can start planning one of her meals better (lunch or dinner, whichever she prefers) as well as possibly being more consistent with her exercise.  F/u in two weeks.

## 2017-11-29 ENCOUNTER — NON-PROVIDER VISIT (OUTPATIENT)
Dept: HEALTH INFORMATION MANAGEMENT | Facility: MEDICAL CENTER | Age: 47
End: 2017-11-29
Payer: COMMERCIAL

## 2017-11-29 VITALS
RESPIRATION RATE: 18 BRPM | HEIGHT: 63 IN | OXYGEN SATURATION: 98 % | BODY MASS INDEX: 29.34 KG/M2 | DIASTOLIC BLOOD PRESSURE: 78 MMHG | HEART RATE: 94 BPM | SYSTOLIC BLOOD PRESSURE: 116 MMHG | WEIGHT: 165.6 LBS | TEMPERATURE: 99.1 F

## 2017-11-29 DIAGNOSIS — I10 ESSENTIAL HYPERTENSION: ICD-10-CM

## 2017-11-29 DIAGNOSIS — E78.1 HYPERTRIGLYCERIDEMIA: ICD-10-CM

## 2017-11-29 DIAGNOSIS — R53.82 CHRONIC FATIGUE: ICD-10-CM

## 2017-11-29 DIAGNOSIS — K21.00 GASTROESOPHAGEAL REFLUX DISEASE WITH ESOPHAGITIS: ICD-10-CM

## 2017-11-29 DIAGNOSIS — R63.5 WEIGHT GAIN, ABNORMAL: ICD-10-CM

## 2017-11-29 DIAGNOSIS — G47.9 SLEEPING DIFFICULTY: ICD-10-CM

## 2017-11-29 PROCEDURE — 99213 OFFICE O/P EST LOW 20 MIN: CPT | Performed by: INTERNAL MEDICINE

## 2017-11-29 PROCEDURE — 97803 MED NUTRITION INDIV SUBSEQ: CPT | Performed by: DIETITIAN, REGISTERED

## 2017-11-29 ASSESSMENT — PAIN SCALES - GENERAL: PAINLEVEL: NO PAIN

## 2017-11-29 NOTE — PROGRESS NOTES
Bariatric Medicine Follow Up  Chief Complaint   Patient presents with   • Weight Gain       History of Present Illness:   Nancie Ugalde is a 47 y.o. female who presents for weight management follow-up and to help address co-morbidities related to overweight, including difficulty sleeping, hypertension, chronic fatigue, hypertriglyceridemia.    During the patient's last visit, the following were discussed and recommended:  Weight Goal: 5% wt loss at one month after start (pt goal weight is 125 lb)  Diet: One meal replacement every morning, paleo  Physical Activity: Daily method 3 times per week  Risk level for moderate/vigorous exercise program: Low  New Rx: Phentermine/topiramate  BP may not be affected by lower dose phentermine.  Pt to monitor daily.  Side Effects:Consent reviewed and signed.  Behavior change: Better meal planning, not skipping breakfast. Consider counseling for grief and binge eating, hiding food.  Discuss abuse history next visit    The patient is feeling great. She is able to stick to the meal plan and feels the medication is helping her do that. Her first few days on the twice daily dosing of phentermine and topiramate may need her feel a little bit confused and not herself. However, she has done well on it since then. Some days she does not take the evening phentermine, when she has taken the morning phentermine and middle of the day. Due to her work schedule, she sometimes has difficulty taking her medications on time. Overall she is very happy with her progress.    She is sleeping better than she ever has, feels much less fatigued. No GERD symptoms currently at all. She likes that she is wanting to eat what she is supposed to, and is not craving all the carbs she was eating before.    Exercise:   Riding her bike 15 minutes every morning is all she can fit in, but she is happy that she is getting some exercise.    Life Style Changes:  Erratic work schedule with long hours persists.    "  Review of Systems   Denies lightheadedness, constipation, GERD symptoms, insomnia, anxiety.  All other ROS were reviewed and are otherwise unchanged from my previous visit with patient.      Physical Exam:  /78   Pulse 94   Temp 37.3 °C (99.1 °F)   Resp 18   Ht 1.588 m (5' 2.5\")   Wt 75.1 kg (165 lb 9.6 oz)   SpO2 98%   BMI 29.81 kg/m²     Body fat % 43.8  REE 1456 kcal/day    Weight change since last visit: -10.2 lb  Waist Circum change since last visit: -1.5 in    Constitutional: Oriented to person, place, and time and well-developed, well-nourished, and in no distress.     Neurological: Alert and oriented to person, place, and time. Normal reflexes. No cranial nerve deficit. No muscle weakness.  Gait normal.   Skin: Warm and dry. Not diaphoretic.   Psychiatric: Mood, memory, affect and judgment normal.     Laboratory:   None new    Dietitian Assessment: I have reviewed the Dietitian's assessment related to this encounter.       ASSESSMENT/PLAN:  Body mass index is 29.81 kg/m².    Obesity Stage ([place]):  2; Class 1    1. Weight gain, abnormal     2. Sleeping difficulty     3. Hypertriglyceridemia     4. Chronic fatigue     5. Essential hypertension     6. Gastroesophageal reflux disease with esophagitis       The patient is doing really well. She is tolerating the phentermine/topiramate and sticking to her goals. Her weight gain is beginning to reverse. She is sleeping very well, chronic fatigue now virtually gone. No GERD symptoms. Hypertension well controlled.    The patient and I have discussed at length and agree to the following recommendations, which are all addressing the above diagnoses:    Weight Goal: 5% wt loss at one month after start (pt goal weight is 125 lb)  Diet: Continue one meal replacement each morning, rest low carb/high protein. To review with RD today.  Physical Activity: Continue bike 15 minutes every morning.  Risk level for moderate/vigorous exercise program: Low  New " Rx: Continue twice a day phentermine/topiramate dosing  Side Effects: Consent signed and in chart.  Behavior change: Likes stimulus narrowing, continue same.  Follow-up: 2 weeks

## 2017-11-29 NOTE — PROGRESS NOTES
"Nutrition Reassess: Medical Weight Management   Today's date: 11/29/2017  Referring Provider: Brennan Baker M.D.      Time: in/out  2:50-3:20pm     Subjective:  Pt states that she has been eating low carb diet. Has cut out a lot of \"junk food\".   Stopped using creamer in coffee.   Is using 1-2 meal replacements a day. Prefers the taste of Arbone protein shake over the Robard meal replacements.   Has been going to [place] exercise class with a friend after work. Is also trying to do her stationary bike at home in the AM 4 days a week. Is trying to increase to 5 days a week.     Diet hx:   B- Arbonne protein shake with unsweetened almond milk (190kcal, ~10g carbs, 22g protein)   S- apple or celery with peanut butter   L- left overs or salad with chicken and oil/vinegar   S- Arbonne protein shake   D- chicken/ veggies or salad      Anthropometrics/Objective  Today's weight: 165.6lb   Previous weight/date:  175.8lb    Weight Change : -10.2lb         Stated Goal Weight: 125lb     Meal Plan:   Low calorie, low carb diet with 1-2 meal replacements per day   B- Arbonne protein drink with almond milk   S- 1oz protein with 1 fruit or NS vegetable   L- 4oz protein, NS veggies, 1-2 healthy fats, and optional starch/fruit/starchy vegetable up to 2 servings   S- Arbonne protein drink or 1oz protein + fruit or vegetable   D- 4oz protein, NS veggies, 1-2 healthy fats, and optional starch/fruit/starchy vegetable up to 2 servings       ReAssesment/Notes:  Pt has lost over 10lb in 2 weeks from following a low carb diet and increasing her exercise. Some of this is likely from diuresis on the low carb diet. Pt asks of low carb is ok/ healthy. Explained to her what we mean by \"low carb\". Emphasis is on avoiding processed refined grains and sweets. However if she wants to include whole grain/ starchy low GI vegetables/ legumes or fruit that she can do so in moderation. Today we discussed the plate method and portion guidelines for " meals. Pt states she had an addiction to carbs and is feeling better off of them. She will incorporate fruit and brown rice or quinoa on occasion. Made the starch optional in her meal plan but it seems that she would like to avoid them all together at meals for now.   She will use Arbonne pea protein instead of the Robard meal replacements moving forward.     Follow-up: 2-3 weeks

## 2017-11-29 NOTE — PATIENT INSTRUCTIONS
Continue medication as is  Continue riding bike every morning 15-30 min  Continue 1 MR with rest low carb/higher protein daily  64+ oz water daily

## 2017-12-13 ENCOUNTER — NON-PROVIDER VISIT (OUTPATIENT)
Dept: HEALTH INFORMATION MANAGEMENT | Facility: MEDICAL CENTER | Age: 47
End: 2017-12-13
Payer: COMMERCIAL

## 2017-12-13 VITALS — BODY MASS INDEX: 29 KG/M2 | WEIGHT: 163.7 LBS | HEIGHT: 63 IN

## 2017-12-13 VITALS
TEMPERATURE: 98 F | BODY MASS INDEX: 29 KG/M2 | HEART RATE: 106 BPM | HEIGHT: 63 IN | OXYGEN SATURATION: 96 % | DIASTOLIC BLOOD PRESSURE: 76 MMHG | SYSTOLIC BLOOD PRESSURE: 114 MMHG | WEIGHT: 163.7 LBS

## 2017-12-13 DIAGNOSIS — E03.9 HYPOTHYROIDISM, UNSPECIFIED TYPE: ICD-10-CM

## 2017-12-13 DIAGNOSIS — K21.00 GASTROESOPHAGEAL REFLUX DISEASE WITH ESOPHAGITIS: ICD-10-CM

## 2017-12-13 DIAGNOSIS — R00.0 TACHYCARDIA: ICD-10-CM

## 2017-12-13 DIAGNOSIS — R63.5 WEIGHT GAIN, ABNORMAL: ICD-10-CM

## 2017-12-13 DIAGNOSIS — R53.82 CHRONIC FATIGUE: ICD-10-CM

## 2017-12-13 DIAGNOSIS — E78.1 HYPERTRIGLYCERIDEMIA: ICD-10-CM

## 2017-12-13 DIAGNOSIS — G47.9 SLEEPING DIFFICULTY: ICD-10-CM

## 2017-12-13 DIAGNOSIS — E55.9 VITAMIN D DEFICIENCY: ICD-10-CM

## 2017-12-13 DIAGNOSIS — I10 ESSENTIAL HYPERTENSION: ICD-10-CM

## 2017-12-13 PROCEDURE — 99214 OFFICE O/P EST MOD 30 MIN: CPT | Performed by: INTERNAL MEDICINE

## 2017-12-13 PROCEDURE — 97803 MED NUTRITION INDIV SUBSEQ: CPT | Performed by: PREVENTIVE MEDICINE

## 2017-12-13 RX ORDER — TOPIRAMATE 50 MG/1
50 TABLET, FILM COATED ORAL 2 TIMES DAILY
Qty: 60 TAB | Refills: 0 | Status: SHIPPED
Start: 2017-12-13 | End: 2018-01-12

## 2017-12-13 RX ORDER — PHENTERMINE HYDROCHLORIDE 15 MG/1
15 CAPSULE ORAL
Qty: 60 CAP | Refills: 0 | Status: SHIPPED | OUTPATIENT
Start: 2017-12-13 | End: 2018-01-12

## 2017-12-13 ASSESSMENT — PAIN SCALES - GENERAL: PAINLEVEL: NO PAIN

## 2017-12-13 NOTE — PROGRESS NOTES
"Nutrition Reassess: Medical Weight Management   Today's date: 12/13/2017  Referring Provider: Brennan Baker M.D.      Time: in/out 8:15-8:36am    Subjective:  Nancie has been sticking to her plan of one meal replacement and overall low carb intake. She is sleeping better and feeling well. She has also been using Gallegos protein powder as a shake. She is planning her meals and using leftover for her lunch the following day. Has had a cold over the last 2 weeks limiting her normal physical activity, but she is planning to get back to using her stationary bike and attending barre classes. She feels she has good support from her  at home who is also losing weight. They both have cleaned out the pantry and removed junk food.     Diet history:   Breakfast - 1 scoop Gallegos protein powder + water or unsweetened almond milk  Snack - celery with almond butter  Lunch - leftovers (protein + veg) or [employer] [place] salad w/o fruit + oil and vinegar  Snack - 1 cheese stick or almonds or 1/2 avocado  Dinner - protein + salad + non-starchy vegetable + olive oil and vinegar     Anthropometrics/Objective  Today's weight: 163.7lbs  Height: 5' 2.5\"  BMI: 29.46   Previous weight/date: 165.6 lbs (11/29/17)   Total weight change : 12.1 lbs         Stated Goal Weight: 125 lbs     Meal Plan:   Continue with low calorie, low carb diet + 1-2 meal replacements   B- protein drink with almond milk or water  S- 1oz protein with 1 fruit or NS vegetable   L- 4oz protein, NS veggies, 1-2 healthy fats, and optional starch/fruit/starchy vegetable up to 2 servings   S- protein drink or 1oz protein + fruit or vegetable   D- 4oz protein, NS veggies, 1-2 healthy fats, and optional starch/fruit/starchy vegetable up to 2 servings     ReAssesment/Notes:  Pt continues to lose weight and make lifestyle changes. She has support from her  at home and from a coworker as well. She prefers to keep out whole grain/starchy vegetables and legumes at " this time. Pt has been measuring foods to help retrain what her idea of a portion size was before starting the program and feels this has been helpful. We did discuss eventually down the road getting to a place where she can visualize an appropriate portion and listen to her hunger cues without having to measure, but for now to continue. Other than her recent bout with a cold, she has also been incorporating low impact exercise into her routine. She was unsure if she was getting enough healthy fats in her diet, so has been incorporating some avocado of which we discussed an appropriate portion size. She is also now using the Gallegos protein powder mix.     Follow-up: 1 month

## 2017-12-13 NOTE — PATIENT INSTRUCTIONS
Continue 1 Meal replacement daily, low carb/high protein rest of day  Continue current exercise regimen

## 2017-12-13 NOTE — PROGRESS NOTES
Bariatric Medicine Follow Up  Chief Complaint   Patient presents with   • Weight Gain       History of Present Illness:   Nancie Ugalde is a 47 y.o. female who presents for weight management follow-up and to help address co-morbidities related to overweight, including sleeping difficulties, hypertriglyceridemia, chronic fatigue, hypertension, GERD/Mcneil's esophagus, vitamin D deficiency.    During the patient's last visit, the following were discussed and recommended:  Weight Goal: 5% wt loss at one month after start (pt goal weight is 125 lb)  Diet: Continue one meal replacement each morning, rest low carb/high protein. To review with RD today.  Physical Activity: Continue bike 15 minutes every morning.  Risk level for moderate/vigorous exercise program: Low  New Rx: Continue twice a day phentermine/topiramate dosing  Side Effects: Consent signed and in chart.  Behavior change: Likes stimulus narrowing, continue same.  Follow-up: 2 weeks    The patient is doing really well. She continues to lose weight, although more slowly the second week than the first, but she feels this program is working well for her. She is not using phentermine in the evenings every night, but always in the morning with the topiramate. She continues to use one meal replacement in the morning, and likes that for planning purposes    A typical day in terms of meals includes one meal replacement in the morning, a snack of almond butter with celery or Gallegos powder that she drinks at her desk, a lunch of leftovers lean meat and vegetables, another snack between lunch and dinner, and a lean meat with vegetables for dinner. She is getting somewhat tired of chicken as she has done a lot, but she is having steak and pork at times as well.    The patient's  is also losing weight by significantly reducing carbohydrates, which is very motivating for the patient.    She is hoping to get off her proton pump inhibitor as her GERD symptoms have  "improved. She will be following up with Gastroenterology in the next few months. Her sleeping difficulties have markedly improved in this short time and she feels no fatigue. Her blood pressure is well controlled at the moment.  She does not worry about her heart rate being slightly elevated, and is not feeling palpitations at all. She would like to get some lab work done, including thyroid tests, to see how her thyroid function is, as well as her vitamin D and lipid levels.     Exercise:   Joined [employer], may start yoga. Her  wants to start yoga classes with her.    Life Style Changes:  Better planning, meal preparation.     Review of Systems   No problems with sleep. \"I've never slept better\". Denies palpitations, lightheadedness, fatigue. Slightly nauseated this morning, because she took medication without her meal replacement.  Coughing at night has stopped. No reflux symptoms.  All other ROS were reviewed and are otherwise unchanged from my previous visit with patient.      Physical Exam:  Encounter Vitals  Temperature: 36.7 °C (98 °F)  Blood Pressure: 114/76  Pulse: (!) 106  Pulse Oximetry: 96 %  Weight: 74.3 kg (163 lb 11.2 oz)  Height: 158.8 cm (5' 2.5\")  Waist: 42 in  BMI (Calculated): 29.46  Pain Score: No pain  Body fat % 45.2  REE 1449 kcal/day    Weight change since last visit: -1.9 lb (-12.1 pounds total)  Waist Circum change since last visit: -1.5 In (-2 inches total)      Constitutional: Oriented to person, place, and time and well-developed, well-nourished, and in no distress.    Cardiovascular: Tachycardia, rate about 100, regular rhythm.    No murmur heard.  Pulmonary/Chest: Effort normal and breath sounds normal. No wheezes.   Abdominal: Soft. Bowel sounds are normal.   Musculoskeletal: Normal range of motion. No edema.   Neurological: Alert and oriented to person, place, and time. No muscle weakness.  Gait normal.   Skin: Warm and dry. Not diaphoretic.   Psychiatric: Mood, memory, affect " and judgment normal.     Laboratory:   None new      Dietitian Assessment: I have reviewed the Dietitian's assessment related to this encounter.       ASSESSMENT/PLAN:  Body mass index is 29.46 kg/m².    Obesity Stage ([place]):  2; Overweight    1. Weight gain, abnormal  phentermine 15 MG capsule   2. Sleeping difficulty  CBC WITHOUT DIFFERENTIAL    phentermine 15 MG capsule   3. Hypertriglyceridemia  LIPID PROFILE    phentermine 15 MG capsule   4. Chronic fatigue  CBC WITHOUT DIFFERENTIAL    THYROID PANEL   5. Essential hypertension  COMP METABOLIC PANEL    phentermine 15 MG capsule   6. Gastroesophageal reflux disease with esophagitis     7. Vitamin D deficiency  VITAMIN 1,25 DIHYDROXY   8. Hypothyroidism, unspecified type  THYROID PANEL   9. Tachycardia       Deanne is doing really well with both the meal replacement and the medication. Her sleeping has significantly improved and she has no GERD symptoms. Her heart rate is up a little bit, but her heart rate tends to run high. This increase may be due to phentermine, but her blood pressure is well controlled now, and overall she feels great. She has met her weight loss goal to continue on phentermine/topiramate. We will continue to monitor her heart rate. We will check labs for vitamin D, thyroid function tests, triglycerides.      The patient and I have discussed at length and agree to the following recommendations, which are all addressing the above diagnoses:    Weight Goal: 5% wt loss at one month after start (pt goal weight is 125 lb)  Diet: One meal replacement each morning, low carb/high protein snacks lunch and dinner.  Physical Activity: Continue exercise program ([place]/yoga) 3 times per week over the next month.  Risk level for moderate/vigorous exercise program: Low to moderate  New Rx: Renewed phentermine/topiramate prescription  Side Effects: Consent signed and in chart.  Behavior change: Continue meal planning, stimulus narrowing  Follow-up: One  month, f/u labs  Monitor heart rate on phentermine/topiramate. To notify us if her heart rate stays greater than 120 for more than 24 hours.    Face to face time spent 30 minutes, with >50% of time devoted to one on one counseling on weight management issues as documented above.

## 2018-01-05 ENCOUNTER — SLEEP STUDY (OUTPATIENT)
Dept: SLEEP MEDICINE | Facility: MEDICAL CENTER | Age: 48
End: 2018-01-05
Payer: COMMERCIAL

## 2018-01-05 DIAGNOSIS — G47.33 OBSTRUCTIVE SLEEP APNEA: ICD-10-CM

## 2018-01-05 PROCEDURE — 95810 POLYSOM 6/> YRS 4/> PARAM: CPT | Performed by: INTERNAL MEDICINE

## 2018-01-08 NOTE — PROCEDURES
Clinical Comments:  The patient underwent a comprehensive polysomnogram using the standard montage for measurement of parameters of sleep, respiratory events, movement abnormalities, heart rate and rhythm. A microphone was used to monitor snoring.        INTERPRETATION:  The study was started at 8:44pm..  The total recording time was 521.1 minutes with a sleep period of 507.8 minutes and the total sleep time was 465.3 minutes with a sleep efficiency of 89.3%.  The sleep latency was 13.3 minutes, and REM latency was 71.0 minutes.  The patient experienced 109 arousals in total, for an arousal index of 14.1     RESPIRATORY: The patient had 4 apneas in total.  Of these, 0 were obstructive apneas, and 4 were central apneas.  This resulted in an apnea index (AI) of 0.5.  The patient had 72 hypopneas, for a hypopnea index of 9.3.  The overall AHI was 9.8, while the AHI during Stage R sleep was 8.3.  AHI while supine was 14.8.     OXIMETRY: Oxygen saturation monitoring showed a mean SpO2 of 95.1%, with a minimum oxygen saturation of 89.0%.  Oxygen saturations were less than or = 89% for 0.0 minutes of sleep time.     CARDIAC: The highest heart rate during the recording was 102.0 beats per minute.  The average heart rate during sleep was 69.0 bpm.     LIMB MOVEMENTS: There were a total of 4 PLMs during sleep, of which 3 were PLMs arousals.  This resulted in a PLMS index of 0.5.    Interpretation:    This was an overnight diagnostic polysomnogram with a possible subsequent positive airway pressure titration. However, the patient did not meet the split night protocol.    The patient's sleep efficiency was excellent at 89.3% and she experienced a normal 5 REM periods. The sleep stage durations revealed an increased amount of wake after sleep onset at 42.5 minutes, 41.5 minutes of stage I sleep, 318.8 minutes of stage II sleep, 11 minutes of stage III sleep, and 94 minutes of REM. This represents a reduced amount of stage III or  delta sleep. The latency to sleep was normal and latency to REM was a normal 71 minutes. Mild sleep fragmentation occurred secondary to both respiratory events and spontaneous arousals. The patient did not have a significant number of limb movements.     Mild obstructive sleep apnea hypopnea was found. The apnea hypopnea index was 9.8, the mean event duration 25.3 seconds, and the longest event lasted 65.7 seconds. The respiratory disturbance index was 17.2 secondary to the occurrence of 57 RERAs. 94.7% of the events were hypopneas. REM and the supine position aggravated the sleep disordered breathing as reflected by the REM index of 12.1 and supine index of 21.5. The lowest saturation during sleep was 89% and saturations were less than 90% for only 0.1% of the recording. The technician noted frequent soft to moderately loud snoring.    Recommendation:    If significant signs, symptoms, and or comorbidities warrant, recommend a positive airway pressure titration. Alternative treatments for OSAH include behavioral modification, use of a dental appliance, and nasopharyngeal reconstructive surgery. Behavior modification includes weight loss, eliminating alcohol and sedatives, and avoiding the supine position. If alternative treatments are used, a follow-up diagnostic study is warranted to ensure efficacy.

## 2018-01-12 ENCOUNTER — SLEEP CENTER VISIT (OUTPATIENT)
Dept: SLEEP MEDICINE | Facility: MEDICAL CENTER | Age: 48
End: 2018-01-12
Payer: COMMERCIAL

## 2018-01-12 VITALS
DIASTOLIC BLOOD PRESSURE: 68 MMHG | HEART RATE: 100 BPM | OXYGEN SATURATION: 98 % | RESPIRATION RATE: 16 BRPM | HEIGHT: 63 IN | SYSTOLIC BLOOD PRESSURE: 102 MMHG

## 2018-01-12 DIAGNOSIS — I10 ESSENTIAL HYPERTENSION: ICD-10-CM

## 2018-01-12 PROCEDURE — 99213 OFFICE O/P EST LOW 20 MIN: CPT | Performed by: FAMILY MEDICINE

## 2018-01-12 NOTE — PROGRESS NOTES
[facility] Follow Up Note     Date: 1/12/2018 / Time: 2:50 PM    Patient ID:   Name:             Nancie Ugalde     YOB: 1970  Age:                 47 y.o.  female   MRN:               [id number]      Thank you for requesting a sleep medicine consultation on Nancie Ugalde at the sleep center. She presents today with the chief complaints of VANESSA and PSG follow up.     HISTORY OF PRESENT ILLNESS:       Pt is currently not on PAP therapy. She goes to sleep around 9-10 pm and wakes up around 5 am. She is getting about 7.5-8 hrs of sleep on a good night and about 4 hr of sleep on a bad night. The bad nights are about 1 per week.The symptoms of excessive daytime, snoring and gasping persist. She had a PSG on 1/5/18 which showed Mild obstructive sleep apnea hypopnea was found. The apnea hypopnea index was 9.8,the lowest saturation during sleep was 89%        REVIEW OF SYSTEMS:       Constitutional: Denies fevers, Denies weight changes  Eyes: Denies changes in vision, no eye pain  Ears/Nose/Throat/Mouth: Denies nasal congestion or sore throat   Cardiovascular: Denies chest pain or palpitations   Respiratory: Denies shortness of breath , Denies cough  Gastrointestinal/Hepatic: Denies abdominal pain, nausea, vomiting, diarrhea, constipation or GI bleeding   Genitourinary: Denies bladder dysfunction, dysuria or frequency  Musculoskeletal/Rheum: Denies  joint pain and swelling   Skin/Breast: Denies rash,   Neurological: Denies headache, confusion, memory loss or focal weakness/parasthesias  Psychiatric: denies mood disorder     Comprehensive review of systems form is reviewed with the patient and is attached in the EMR.     PMH:  has a past medical history of Asthma; Disorder of thyroid; Gynecological disorder; Hypertension; and Indigestion. She also has no past medical history of Breast cancer (CMS-[facility]); COPD; Diabetic neuropathy (CMS-HCC); or EMPHYSEMA.  MEDS:   Current Outpatient  Prescriptions:   •  lisinopril (PRINIVIL, ZESTRIL) 40 MG tablet, , Disp: , Rfl:   •  pantoprazole (PROTONIX) 40 MG Tablet Delayed Response, , Disp: , Rfl:   •  Misc Natural Products (PROGESTERONE EX), by Apply externally route., Disp: , Rfl:   •  losartan (COZAAR) 50 MG Tab, Take 50 mg by mouth every day., Disp: , Rfl:   •  thyroid (ARMOUR THYROID) 60 MG Tab, Take 60 mg by mouth every day., Disp: , Rfl:   •  phentermine 15 MG capsule, Take 1 Cap by mouth 2 Times a Day for 30 days., Disp: 60 Cap, Rfl: 0  •  topiramate (TOPAMAX) 50 MG tablet, Take 1 Tab by mouth 2 times a day for 30 days., Disp: 60 Tab, Rfl: 0  •  ibuprofen (MOTRIN) 600 MG Tab, Take 1 Tab by mouth every 6 hours as needed., Disp: 30 Tab, Rfl: 3  ALLERGIES:   Allergies   Allergen Reactions   • Nkda [No Known Drug Allergy]      SURGHX:   Past Surgical History:   Procedure Laterality Date   • HYSTERECTOMY LAPAROSCOPY Bilateral 9/21/2016    Procedure: HYSTERECTOMY LAPAROSCOPY bilateral salpingectomy;  Surgeon: Nba Benitez M.D.;  Location: SURGERY SAME DAY [facility];  Service:    • VAGINAL SUSPENSION N/A 9/21/2016    Procedure: UTEROSACRAL VAULT SUSPENSION;  Surgeon: Nba Benitez M.D.;  Location: SURGERY SAME DAY [facility];  Service:    • CYSTOSCOPY N/A 9/21/2016    Procedure: CYSTOSCOPY;  Surgeon: Nba Benitez M.D.;  Location: SURGERY SAME DAY [facility];  Service:    • GYN SURGERY     • OTHER ABDOMINAL SURGERY      gallbladder, appendix   • TUBAL LIGATION       SOCHX:  reports that she quit smoking about 16 years ago. Her smoking use included Cigarettes. She has a 15.00 pack-year smoking history. She has never used smokeless tobacco. She reports that she drinks alcohol. She reports that she does not use drugs..  FH:   Family History   Problem Relation Age of Onset   • Lung Cancer Father          Physical Exam:  Vitals/ General Appearance:   Weight/BMI: There is no height or weight on file to calculate BMI.  There were no vitals  taken for this visit.  There were no vitals filed for this visit.    Pt. is alert and oriented to time, place and person. Cooperative and in no apparent distress.       1. Head: Atraumatic, normocephalic.   2. Ears: Normal tympanic membrane and no discharge  3. Nose: No inferior turbinate hypertophy, no septal deviation, no polyp.   4. Throat: Oropharynx appears crowded in that the palate is overhanging (Malam Becca scale 3).    5. Neck: Supple. No thyromegaly  6. Chest: Trachea central, no spine deformity   7. Lungs auscultation: B/L good air entry, vesicular breath sounds, no adventitious sounds  8. Heart auscultation: 1st and 2nd heart sounds normal, regular rhythm. No appreciable murmur.  9. Abdomen: Soft, non tender, no organomegaly. Bowel sounds present  10. Extremities: No, no deformity, no clubbing, no pedal edema.  11. Skin: No rash  12. NEUROLOGICAL EXAMINATION: On neurological exam, the patient was alert and oriented x3. speech was clear and fluent without dysarthria.      INVESTIGATIONS:           ASSESSMENT AND PLAN     1.Obstructive Sleep Apnea (VANESSA).She The symptoms of excessive daytime, snoring persist.      The pathophysiology of VANESSA and the increased risk of cardiovascular morbidity from untreated VANESSA is discussed in detail with the patient. She  also has HTN which can be worsened by her VANESSA.     She is urged to avoid supine sleep, weight gain and alcoholic beverages since all of these can worsen VANESSA. She is cautioned against drowsy driving. If She feels sleepy while driving, She must pull over for a break/nap, rather than persist on the road, in the interest of She own safety and that of others on the road.   Plan   - Auto CPAP vs overnight CPAP titration vs mandibular advancement device was discussed. After informed discussion she  Would like to persure oral appliance as a treatment.    - PSG was reviewed and discussed with the pt    2.  Regarding treatment of other past medical problems and  general health maintenance,  She is urged to follow up with PCP.

## 2018-01-15 ENCOUNTER — APPOINTMENT (OUTPATIENT)
Dept: HEALTH INFORMATION MANAGEMENT | Facility: MEDICAL CENTER | Age: 48
End: 2018-01-15
Payer: COMMERCIAL

## 2018-06-15 ENCOUNTER — OFFICE VISIT (OUTPATIENT)
Dept: MEDICAL GROUP | Facility: MEDICAL CENTER | Age: 48
End: 2018-06-15
Payer: COMMERCIAL

## 2018-06-15 VITALS
SYSTOLIC BLOOD PRESSURE: 138 MMHG | OXYGEN SATURATION: 96 % | BODY MASS INDEX: 29.88 KG/M2 | HEIGHT: 63 IN | HEART RATE: 84 BPM | WEIGHT: 168.6 LBS | DIASTOLIC BLOOD PRESSURE: 98 MMHG | TEMPERATURE: 98.5 F

## 2018-06-15 DIAGNOSIS — E06.3 HYPOTHYROIDISM DUE TO HASHIMOTO'S THYROIDITIS: ICD-10-CM

## 2018-06-15 DIAGNOSIS — Z12.31 SCREENING MAMMOGRAM, ENCOUNTER FOR: ICD-10-CM

## 2018-06-15 DIAGNOSIS — I10 ESSENTIAL HYPERTENSION: ICD-10-CM

## 2018-06-15 DIAGNOSIS — E78.1 PURE HYPERGLYCERIDEMIA: ICD-10-CM

## 2018-06-15 DIAGNOSIS — Z00.00 ANNUAL PHYSICAL EXAM: ICD-10-CM

## 2018-06-15 DIAGNOSIS — E03.8 HYPOTHYROIDISM DUE TO HASHIMOTO'S THYROIDITIS: ICD-10-CM

## 2018-06-15 DIAGNOSIS — E66.9 OBESITY (BMI 30-39.9): ICD-10-CM

## 2018-06-15 DIAGNOSIS — K22.70 BARRETT'S ESOPHAGUS DETERMINED BY ENDOSCOPY: ICD-10-CM

## 2018-06-15 DIAGNOSIS — K21.00 GASTROESOPHAGEAL REFLUX DISEASE WITH ESOPHAGITIS: ICD-10-CM

## 2018-06-15 PROCEDURE — 99204 OFFICE O/P NEW MOD 45 MIN: CPT | Performed by: NURSE PRACTITIONER

## 2018-06-15 RX ORDER — PANTOPRAZOLE SODIUM 40 MG/1
40 TABLET, DELAYED RELEASE ORAL DAILY
Qty: 90 TAB | Refills: 0 | Status: SHIPPED | OUTPATIENT
Start: 2018-06-15 | End: 2018-10-30 | Stop reason: SDUPTHER

## 2018-06-15 RX ORDER — THYROID,PORK 90 MG
90 TABLET ORAL DAILY
COMMUNITY
Start: 2018-04-21 | End: 2018-10-30 | Stop reason: SDUPTHER

## 2018-06-15 RX ORDER — LISINOPRIL AND HYDROCHLOROTHIAZIDE 20; 12.5 MG/1; MG/1
2 TABLET ORAL DAILY
Qty: 60 TAB | Refills: 11 | Status: SHIPPED | OUTPATIENT
Start: 2018-06-15 | End: 2019-07-04 | Stop reason: SDUPTHER

## 2018-06-15 NOTE — PROGRESS NOTES
Nancie Ugalde is a 47 y.o. female here to establish care, for lab orders, and to discuss blood pressure:  HPI:    Essential hypertension  Has been on lisinopril for about a year, blood pressure has not been controlled, ranging 130-140/. Was on losartan in the past but this did not control blood pressure either. Denies chest pain, headaches, dizziness, vision changes. Checks blood pressure every morning and most days after work.     Current medicines (including changes today)  Current Outpatient Prescriptions   Medication Sig Dispense Refill   • lisinopril-hydrochlorothiazide (PRINZIDE, ZESTORETIC) 20-12.5 MG per tablet Take 2 Tabs by mouth every day. 60 Tab 11   • pantoprazole (PROTONIX) 40 MG Tablet Delayed Response Take 1 Tab by mouth every day. 90 Tab 0   • ARMOUR THYROID 90 MG Tab Take 90 mg by mouth every day.     • Misc Natural Products (PROGESTERONE EX) by Apply externally route.     • ibuprofen (MOTRIN) 600 MG Tab Take 1 Tab by mouth every 6 hours as needed. 30 Tab 3     No current facility-administered medications for this visit.      She  has a past medical history of Disorder of thyroid; GERD (gastroesophageal reflux disease); Gynecological disorder; Hypertension; and Indigestion.  She  has a past surgical history that includes other abdominal surgery; gyn surgery; tubal ligation; hysterectomy laparoscopy (Bilateral, 9/21/2016); vaginal suspension (N/A, 9/21/2016); and cystoscopy (N/A, 9/21/2016).  Social History   Substance Use Topics   • Smoking status: Former Smoker     Packs/day: 1.00     Years: 15.00     Types: Cigarettes     Quit date: 1999   • Smokeless tobacco: Never Used   • Alcohol use 4.2 oz/week     7 Glasses of wine per week     Social History     Social History Narrative   • No narrative on file     Family History   Problem Relation Age of Onset   • Lung Disease Mother      copd   • Hypertension Mother    • Alcohol/Drug Mother    • Lung Cancer Father    • Lung Disease Father       "cancer   • Alcohol/Drug Father    • Heart Disease Maternal Grandmother      Fatal MI at 48, sisters had CVAs     Family Status   Relation Status   • Mother    • Father    • Maternal Grandmother          ROS  No chest pain, no abdominal pain, no rash.  Positive ROS as per HPI.  All other systems reviewed and are negative      Objective:     Blood pressure 138/98, pulse 84, temperature 36.9 °C (98.5 °F), height 1.588 m (5' 2.5\"), weight 76.5 kg (168 lb 9.6 oz), SpO2 96 %, not currently breastfeeding. Body mass index is 30.35 kg/m².     Physical Exam:    Constitutional: Alert, no distress.  Skin: Warm, dry, good turgor, no rashes in visible areas.  Eye: Equal, round and reactive, conjunctiva clear, lids normal.  ENMT: Lips without lesions, good dentition, oropharynx clear.  Neck: Trachea midline, no masses, no thyromegaly. No cervical or supraclavicular lymphadenopathy.  Respiratory: Unlabored respiratory effort, lungs clear to auscultation, no wheezes, no ronchi.  Cardiovascular: Normal S1, S2, no murmur, no edema.  Abdomen: Soft, non-tender, no masses, no hepatosplenomegaly.  Psych: Alert and oriented x3, normal affect and mood.        Assessment and Plan:   The following treatment plan was discussed    1. Annual physical exam  Check labs, follow-up for annual.  - CBC WITH DIFFERENTIAL; Future  - COMP METABOLIC PANEL; Future  - LIPID PROFILE; Future  - VITAMIN D,25 HYDROXY; Future    2. Essential hypertension  Unstable.  Add hydrochlorothiazide 25 mg daily to lisinopril 40 mg.  Check microalbumin.  Advised patient to continue checking blood pressure twice daily and keep a log to review at follow-up appointment.  Also advised patient that if her blood pressure does not see an improvement in the next 1-2 weeks to notify me via my chart and I will send over a prescription for amlodipine 5 mg as well.  - lisinopril-hydrochlorothiazide (PRINZIDE, ZESTORETIC) 20-12.5 MG per tablet; Take 2 Tabs by " mouth every day.  Dispense: 60 Tab; Refill: 11  - MICROALBUMIN CREAT RATIO URINE; Future    3. Mcneil's esophagus determined by endoscopy  Stable.  Continue follow-up with gastroenterology.  Continue pantoprazole 40 mg daily.  Chronic cough likely related to GERD and not lisinopril as cough is intermittent and patient will go weeks at times without cough.  Worse when laying down.  Discussed this with gastroenterology.  - pantoprazole (PROTONIX) 40 MG Tablet Delayed Response; Take 1 Tab by mouth every day.  Dispense: 90 Tab; Refill: 0    4. Gastroesophageal reflux disease with esophagitis  Unstable.  Continue pantoprazole 40 mg daily.  Patient having persistent cough, mostly at night when she lays down.  Advised patient to discuss this with gastroenterology when she follows up.  - pantoprazole (PROTONIX) 40 MG Tablet Delayed Response; Take 1 Tab by mouth every day.  Dispense: 90 Tab; Refill: 0    5. Pure hyperglyceridemia  Check lipids  - LIPID PROFILE; Future    6. Hypothyroidism due to Hashimoto's thyroiditis  Stable.  Continue [place] Thyroid 90 mg daily.    Recheck TSH and T4.  - TSH; Future  - FREE THYROXINE; Future    7. Screening mammogram, encounter for  Mammogram ordered  - MA-SCREEN MAMMO W/CAD-BILAT; Future    8. Obesity (BMI 30-39.9)  - Patient identified as having weight management issue.  Appropriate orders and counseling given.      He was wears a dentist who was at the dentist is  Followup: Return in about 4 weeks (around 7/13/2018) for Hypertension, labs.    I have placed the below orders and discussed them with an approved delegating provider. The MA is performing the below orders under the direction of Dr. Lundy

## 2018-06-15 NOTE — ASSESSMENT & PLAN NOTE
Has been on lisinopril for about a year, blood pressure has not been controlled, ranging 130-140/. Was on losartan in the past but this did not control blood pressure either. Denies chest pain, headaches, dizziness, vision changes. Checks blood pressure every morning and most days after work.

## 2018-07-18 ENCOUNTER — HOSPITAL ENCOUNTER (OUTPATIENT)
Dept: RADIOLOGY | Facility: MEDICAL CENTER | Age: 48
End: 2018-07-18
Attending: NURSE PRACTITIONER
Payer: COMMERCIAL

## 2018-07-18 ENCOUNTER — HOSPITAL ENCOUNTER (OUTPATIENT)
Dept: LAB | Facility: MEDICAL CENTER | Age: 48
End: 2018-07-18
Attending: NURSE PRACTITIONER
Payer: COMMERCIAL

## 2018-07-18 DIAGNOSIS — Z12.31 SCREENING MAMMOGRAM, ENCOUNTER FOR: ICD-10-CM

## 2018-07-18 DIAGNOSIS — I10 ESSENTIAL HYPERTENSION: ICD-10-CM

## 2018-07-18 DIAGNOSIS — E03.8 HYPOTHYROIDISM DUE TO HASHIMOTO'S THYROIDITIS: ICD-10-CM

## 2018-07-18 DIAGNOSIS — Z00.00 ANNUAL PHYSICAL EXAM: ICD-10-CM

## 2018-07-18 DIAGNOSIS — E06.3 HYPOTHYROIDISM DUE TO HASHIMOTO'S THYROIDITIS: ICD-10-CM

## 2018-07-18 DIAGNOSIS — R92.30 DENSE BREAST TISSUE: ICD-10-CM

## 2018-07-18 LAB
25(OH)D3 SERPL-MCNC: 32 NG/ML (ref 30–100)
BASOPHILS # BLD AUTO: 0.9 % (ref 0–1.8)
BASOPHILS # BLD: 0.09 K/UL (ref 0–0.12)
CREAT UR-MCNC: 66.6 MG/DL
EOSINOPHIL # BLD AUTO: 0.21 K/UL (ref 0–0.51)
EOSINOPHIL NFR BLD: 2.2 % (ref 0–6.9)
ERYTHROCYTE [DISTWIDTH] IN BLOOD BY AUTOMATED COUNT: 39.4 FL (ref 35.9–50)
HCT VFR BLD AUTO: 42.6 % (ref 37–47)
HGB BLD-MCNC: 14.4 G/DL (ref 12–16)
IMM GRANULOCYTES # BLD AUTO: 0.09 K/UL (ref 0–0.11)
IMM GRANULOCYTES NFR BLD AUTO: 0.9 % (ref 0–0.9)
LYMPHOCYTES # BLD AUTO: 2.63 K/UL (ref 1–4.8)
LYMPHOCYTES NFR BLD: 27.7 % (ref 22–41)
MCH RBC QN AUTO: 31.2 PG (ref 27–33)
MCHC RBC AUTO-ENTMCNC: 33.8 G/DL (ref 33.6–35)
MCV RBC AUTO: 92.2 FL (ref 81.4–97.8)
MICROALBUMIN UR-MCNC: <0.7 MG/DL
MICROALBUMIN/CREAT UR: NORMAL MG/G (ref 0–30)
MONOCYTES # BLD AUTO: 0.67 K/UL (ref 0–0.85)
MONOCYTES NFR BLD AUTO: 7 % (ref 0–13.4)
NEUTROPHILS # BLD AUTO: 5.82 K/UL (ref 2–7.15)
NEUTROPHILS NFR BLD: 61.3 % (ref 44–72)
NRBC # BLD AUTO: 0 K/UL
NRBC BLD-RTO: 0 /100 WBC
PLATELET # BLD AUTO: 345 K/UL (ref 164–446)
PMV BLD AUTO: 10.8 FL (ref 9–12.9)
RBC # BLD AUTO: 4.62 M/UL (ref 4.2–5.4)
T4 FREE SERPL-MCNC: 0.93 NG/DL (ref 0.53–1.43)
TSH SERPL DL<=0.005 MIU/L-ACNC: 0.87 UIU/ML (ref 0.38–5.33)
WBC # BLD AUTO: 9.5 K/UL (ref 4.8–10.8)

## 2018-07-18 PROCEDURE — 84443 ASSAY THYROID STIM HORMONE: CPT

## 2018-07-18 PROCEDURE — 76641 ULTRASOUND BREAST COMPLETE: CPT

## 2018-07-18 PROCEDURE — 82570 ASSAY OF URINE CREATININE: CPT

## 2018-07-18 PROCEDURE — 82043 UR ALBUMIN QUANTITATIVE: CPT

## 2018-07-18 PROCEDURE — 77067 SCR MAMMO BI INCL CAD: CPT

## 2018-07-18 PROCEDURE — 84439 ASSAY OF FREE THYROXINE: CPT

## 2018-07-18 PROCEDURE — 36415 COLL VENOUS BLD VENIPUNCTURE: CPT

## 2018-07-18 PROCEDURE — 80061 LIPID PANEL: CPT

## 2018-07-18 PROCEDURE — 85025 COMPLETE CBC W/AUTO DIFF WBC: CPT

## 2018-07-18 PROCEDURE — 82306 VITAMIN D 25 HYDROXY: CPT

## 2018-07-18 PROCEDURE — 80053 COMPREHEN METABOLIC PANEL: CPT

## 2018-07-19 LAB
ALBUMIN SERPL BCP-MCNC: 4.3 G/DL (ref 3.2–4.9)
ALBUMIN/GLOB SERPL: 1.4 G/DL
ALP SERPL-CCNC: 57 U/L (ref 30–99)
ALT SERPL-CCNC: 14 U/L (ref 2–50)
ANION GAP SERPL CALC-SCNC: 10 MMOL/L (ref 0–11.9)
AST SERPL-CCNC: 15 U/L (ref 12–45)
BILIRUB SERPL-MCNC: 0.5 MG/DL (ref 0.1–1.5)
BUN SERPL-MCNC: 21 MG/DL (ref 8–22)
CALCIUM SERPL-MCNC: 9.4 MG/DL (ref 8.5–10.5)
CHLORIDE SERPL-SCNC: 101 MMOL/L (ref 96–112)
CHOLEST SERPL-MCNC: 168 MG/DL (ref 100–199)
CO2 SERPL-SCNC: 25 MMOL/L (ref 20–33)
CREAT SERPL-MCNC: 0.74 MG/DL (ref 0.5–1.4)
GLOBULIN SER CALC-MCNC: 3 G/DL (ref 1.9–3.5)
GLUCOSE SERPL-MCNC: 88 MG/DL (ref 65–99)
HDLC SERPL-MCNC: 47 MG/DL
LDLC SERPL CALC-MCNC: 92 MG/DL
POTASSIUM SERPL-SCNC: 4 MMOL/L (ref 3.6–5.5)
PROT SERPL-MCNC: 7.3 G/DL (ref 6–8.2)
SODIUM SERPL-SCNC: 136 MMOL/L (ref 135–145)
TRIGL SERPL-MCNC: 147 MG/DL (ref 0–149)

## 2018-07-20 ENCOUNTER — TELEPHONE (OUTPATIENT)
Dept: MEDICAL GROUP | Facility: MEDICAL CENTER | Age: 48
End: 2018-07-20

## 2018-07-20 NOTE — TELEPHONE ENCOUNTER
----- Message from LOLIS Archer sent at 7/19/2018  6:32 PM PDT -----  Released to [employer].     LOLIS Archer

## 2018-07-23 ENCOUNTER — OFFICE VISIT (OUTPATIENT)
Dept: MEDICAL GROUP | Facility: MEDICAL CENTER | Age: 48
End: 2018-07-23
Payer: COMMERCIAL

## 2018-07-23 VITALS
BODY MASS INDEX: 30.02 KG/M2 | HEIGHT: 63 IN | SYSTOLIC BLOOD PRESSURE: 142 MMHG | TEMPERATURE: 97.3 F | WEIGHT: 169.4 LBS | DIASTOLIC BLOOD PRESSURE: 80 MMHG | RESPIRATION RATE: 16 BRPM | OXYGEN SATURATION: 98 % | HEART RATE: 91 BPM

## 2018-07-23 DIAGNOSIS — I10 ESSENTIAL HYPERTENSION: ICD-10-CM

## 2018-07-23 DIAGNOSIS — J35.1 LARGE TONSILS: ICD-10-CM

## 2018-07-23 DIAGNOSIS — Z80.8 FAMILY HISTORY OF SKIN CANCER: ICD-10-CM

## 2018-07-23 DIAGNOSIS — L57.0 AK (ACTINIC KERATOSIS): ICD-10-CM

## 2018-07-23 DIAGNOSIS — G47.33 OBSTRUCTIVE SLEEP APNEA SYNDROME: ICD-10-CM

## 2018-07-23 PROCEDURE — 99214 OFFICE O/P EST MOD 30 MIN: CPT | Performed by: NURSE PRACTITIONER

## 2018-07-24 NOTE — PROGRESS NOTES
"Subjective:   Nancie Ugalde is a 47 y.o. female here today to review labs and discuss the following:      AK (actinic keratosis)  Present for a few years. Recently noticed it is raised and rough now.  She does have a family history of skin cancer, her mother has had multiple basal cell carcinomas removed over the years.  She spends quite a bit of time in the sun.  No personal history of skin cancer.    Obstructive sleep apnea syndrome  Diagnosed with mild obstructive sleep apnea and hypo-apnea in January.  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  She is requesting a referral for that today.       Current medicines (including changes today)  Current Outpatient Prescriptions   Medication Sig Dispense Refill   • ARMOUR THYROID 90 MG Tab Take 90 mg by mouth every day.     • lisinopril-hydrochlorothiazide (PRINZIDE, ZESTORETIC) 20-12.5 MG per tablet Take 2 Tabs by mouth every day. 60 Tab 11   • pantoprazole (PROTONIX) 40 MG Tablet Delayed Response Take 1 Tab by mouth every day. 90 Tab 0   • Misc Natural Products (PROGESTERONE EX) by Apply externally route.     • ibuprofen (MOTRIN) 600 MG Tab Take 1 Tab by mouth every 6 hours as needed. 30 Tab 3     No current facility-administered medications for this visit.      She  has a past medical history of Disorder of thyroid; GERD (gastroesophageal reflux disease); Gynecological disorder; Hypertension; and Indigestion.    ROS   No chest pain, no shortness of breath, no abdominal pain  Positive ROS as per HPI.  All other systems reviewed and are negative.     Objective:     Blood pressure 142/80, pulse 91, temperature 36.3 °C (97.3 °F), resp. rate 16, height 1.588 m (5' 2.52\"), weight 76.8 kg (169 lb 6.4 oz), SpO2 98 %. Body mass index is 30.47 kg/m².     Physical Exam:  Constitutional: Alert, no " distress.  Skin: Warm, dry, good turgor, no rashes in visible areas. + AK noted on left lateral ankle  Eye: Equal, round and reactive, conjunctiva clear, lids normal.  ENMT: Lips without lesions, good dentition, oropharynx clear. + Large tonsils noted  Neck: Trachea midline, no masses, no thyromegaly. No cervical or supraclavicular lymphadenopathy  Respiratory: Unlabored respiratory effort, lungs clear to auscultation, no wheezes, no ronchi.  Cardiovascular: Normal S1, S2, no murmur, no edema.  Abdomen: Soft, non-tender, no masses, no hepatosplenomegaly.  Psych: Alert and oriented x3, normal affect and mood.        Assessment and Plan:   The following treatment plan was discussed    1. Essential hypertension  Stable.  Still slightly elevated at 130s/80s at home, patient is starting a exercise class tomorrow and plans to lose weight.  Referred to ENT for treatment of sleep apnea.  Hopefully this will improve her blood pressure further.  Will hold off on increasing medications at this time.    2. Obstructive sleep apnea syndrome  Unstable.  Follow-up with ENT for evaluation for possible tonsillectomy and/or dental appliance for treatment of sleep apnea.    This may improve her hypertension as well.  - REFERRAL TO ENT    3. Large tonsils  Follow-up with ENT  - REFERRAL TO ENT    4. AK (actinic keratosis)  Unstable.  Likely AK, but due to family history could be a BCC.  Follow-up with dermatology for overall skin check and annual monitoring due to family history.  - REFERRAL TO DERMATOLOGY    5. Family history of skin cancer  As above.  - REFERRAL TO DERMATOLOGY      Followup: Return in about 3 months (around 10/23/2018) for Hypertension.    I have placed the below orders and discussed them with an approved delegating provider. The MA is performing the below orders under the direction of Dr. Lundy

## 2018-07-24 NOTE — ASSESSMENT & PLAN NOTE
Diagnosed with mild obstructive sleep apnea and hypo-apnea in January.  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  She is requesting a referral for that today.

## 2018-07-24 NOTE — ASSESSMENT & PLAN NOTE
Present for a few years. Recently noticed it is raised and rough now.  She does have a family history of skin cancer, her mother has had multiple basal cell carcinomas removed over the years.  She spends quite a bit of time in the sun.  No personal history of skin cancer.

## 2018-07-24 NOTE — ASSESSMENT & PLAN NOTE
Blood pressures have been ranging in the high 130s/80s at home.  Slightly more elevated in office today.  She denies chest pain, headaches, dizziness, vision changes.  Labs are unremarkable.  She has been taking her lisinopril-hydrochlorothiazide 1 tablet in the morning and 1 tablet at night where she should have been taking this 2 tablets once daily.

## 2018-08-23 ENCOUNTER — APPOINTMENT (RX ONLY)
Dept: URBAN - METROPOLITAN AREA CLINIC 20 | Facility: CLINIC | Age: 48
Setting detail: DERMATOLOGY
End: 2018-08-23

## 2018-08-23 DIAGNOSIS — L82.0 INFLAMED SEBORRHEIC KERATOSIS: ICD-10-CM

## 2018-08-23 PROBLEM — I10 ESSENTIAL (PRIMARY) HYPERTENSION: Status: ACTIVE | Noted: 2018-08-23

## 2018-08-23 PROBLEM — E03.9 HYPOTHYROIDISM, UNSPECIFIED: Status: ACTIVE | Noted: 2018-08-23

## 2018-08-23 PROCEDURE — 17110 DESTRUCTION B9 LES UP TO 14: CPT

## 2018-08-23 PROCEDURE — ? COUNSELING

## 2018-08-23 PROCEDURE — ? LIQUID NITROGEN

## 2018-08-23 ASSESSMENT — LOCATION SIMPLE DESCRIPTION DERM: LOCATION SIMPLE: LEFT LOWER LEG

## 2018-08-23 ASSESSMENT — LOCATION DETAILED DESCRIPTION DERM: LOCATION DETAILED: LEFT DISTAL LATERAL PRETIBIAL REGION

## 2018-08-23 ASSESSMENT — LOCATION ZONE DERM: LOCATION ZONE: LEG

## 2018-09-07 ENCOUNTER — DOCUMENTATION (OUTPATIENT)
Dept: OCCUPATIONAL MEDICINE | Facility: CLINIC | Age: 48
End: 2018-09-07

## 2018-09-13 ENCOUNTER — EH NON-PROVIDER (OUTPATIENT)
Dept: OCCUPATIONAL MEDICINE | Facility: CLINIC | Age: 48
End: 2018-09-13

## 2018-09-13 DIAGNOSIS — Z02.89 ENCOUNTER FOR OCCUPATIONAL HEALTH EXAMINATION INVOLVING RESPIRATOR: ICD-10-CM

## 2018-09-13 PROCEDURE — 94375 RESPIRATORY FLOW VOLUME LOOP: CPT | Performed by: PREVENTIVE MEDICINE

## 2018-09-19 ENCOUNTER — IMMUNIZATION (OUTPATIENT)
Dept: OCCUPATIONAL MEDICINE | Facility: CLINIC | Age: 48
End: 2018-09-19

## 2018-09-19 DIAGNOSIS — Z23 NEED FOR VACCINATION: ICD-10-CM

## 2018-09-24 PROCEDURE — 90686 IIV4 VACC NO PRSV 0.5 ML IM: CPT | Performed by: PREVENTIVE MEDICINE

## 2018-10-30 ENCOUNTER — OFFICE VISIT (OUTPATIENT)
Dept: MEDICAL GROUP | Facility: MEDICAL CENTER | Age: 48
End: 2018-10-30
Payer: COMMERCIAL

## 2018-10-30 VITALS
BODY MASS INDEX: 30.12 KG/M2 | SYSTOLIC BLOOD PRESSURE: 112 MMHG | TEMPERATURE: 97.5 F | HEART RATE: 99 BPM | OXYGEN SATURATION: 96 % | WEIGHT: 170 LBS | RESPIRATION RATE: 16 BRPM | DIASTOLIC BLOOD PRESSURE: 80 MMHG | HEIGHT: 63 IN

## 2018-10-30 DIAGNOSIS — I10 ESSENTIAL HYPERTENSION: ICD-10-CM

## 2018-10-30 DIAGNOSIS — E03.8 HYPOTHYROIDISM DUE TO HASHIMOTO'S THYROIDITIS: ICD-10-CM

## 2018-10-30 DIAGNOSIS — K22.70 BARRETT'S ESOPHAGUS DETERMINED BY ENDOSCOPY: ICD-10-CM

## 2018-10-30 DIAGNOSIS — E06.3 HYPOTHYROIDISM DUE TO HASHIMOTO'S THYROIDITIS: ICD-10-CM

## 2018-10-30 DIAGNOSIS — L71.0 PERIORAL DERMATITIS: ICD-10-CM

## 2018-10-30 DIAGNOSIS — K21.00 GASTROESOPHAGEAL REFLUX DISEASE WITH ESOPHAGITIS: ICD-10-CM

## 2018-10-30 PROCEDURE — 99214 OFFICE O/P EST MOD 30 MIN: CPT | Performed by: NURSE PRACTITIONER

## 2018-10-30 RX ORDER — PANTOPRAZOLE SODIUM 40 MG/1
40 TABLET, DELAYED RELEASE ORAL DAILY
Qty: 90 TAB | Refills: 3 | Status: SHIPPED | OUTPATIENT
Start: 2018-10-30 | End: 2020-01-21

## 2018-10-30 RX ORDER — DOXYCYCLINE HYCLATE 100 MG
100 TABLET ORAL 2 TIMES DAILY
Qty: 28 TAB | Refills: 0 | Status: SHIPPED | OUTPATIENT
Start: 2018-10-30 | End: 2019-11-08

## 2018-10-30 RX ORDER — THYROID,PORK 90 MG
90 TABLET ORAL DAILY
Qty: 90 TAB | Refills: 3 | Status: SHIPPED | OUTPATIENT
Start: 2018-10-30 | End: 2019-12-19 | Stop reason: SDUPTHER

## 2018-10-30 ASSESSMENT — PATIENT HEALTH QUESTIONNAIRE - PHQ9: CLINICAL INTERPRETATION OF PHQ2 SCORE: 0

## 2018-10-30 NOTE — PROGRESS NOTES
Subjective:     Chief Complaint   Patient presents with   • Follow-Up     BLOOD PRESSURE   • Medication Refill     THYROID     Nancie Ugalde is a 48 y.o. female established patient of Mirna JEROME here today to follow up on:    Essential hypertension  BP now at goal on lisinopril-hctz 20-12.5 milligrams daily  No chest pain, dizziness, palpitation    Mcneil's esophagus determined by endoscopy  Last EGD 2016.  She has not been taking pantoprazole regularly due to concerns related to long-term use    Perioral dermatitis  Erythematous rash around the mouth starting around the same time the hydrochlorothiazide was added to her medications.  She is getting small fluid-filled vesicles.  She is not changed hygiene products.  She is avoiding wearing makeup when able but rash is not improving.    Hypothyroidism due to Hashimoto's thyroiditis  Recent labs reviewed.  Stable on [place] Thyroid 90 mg daily.  Good energy level.  No constipation, diarrhea, heat or cold intolerance       Current medicines (including changes today)  Current Outpatient Prescriptions   Medication Sig Dispense Refill   • pantoprazole (PROTONIX) 40 MG Tablet Delayed Response Take 1 Tab by mouth every day. 90 Tab 3   • ARMOUR THYROID 90 MG Tab Take 1 Tab by mouth every day. 90 Tab 3   • doxycycline (VIBRAMYCIN) 100 MG Tab Take 1 Tab by mouth 2 times a day. 28 Tab 0   • lisinopril-hydrochlorothiazide (PRINZIDE, ZESTORETIC) 20-12.5 MG per tablet Take 2 Tabs by mouth every day. 60 Tab 11   • Misc Natural Products (PROGESTERONE EX) by Apply externally route.     • ibuprofen (MOTRIN) 600 MG Tab Take 1 Tab by mouth every 6 hours as needed. 30 Tab 3     No current facility-administered medications for this visit.      She  has a past medical history of Disorder of thyroid; GERD (gastroesophageal reflux disease); Gynecological disorder; Hypertension; and Indigestion.    ROS included above     Objective:     Blood pressure 112/80, pulse 99, temperature  "36.4 °C (97.5 °F), temperature source Temporal, resp. rate 16, height 1.588 m (5' 2.52\"), weight 77.1 kg (170 lb), SpO2 96 %, not currently breastfeeding. Body mass index is 30.58 kg/m².     Physical Exam:  General: Alert, oriented in no acute distress.  Eye contact is good, speech is normal, affect calm  HEENT: Scattered erythematous patches on the chin and upper lip, few filled vesicles.  No crusting.  No open lesions or drainage   Lungs: clear to auscultation bilaterally, normal effort, no wheeze/ rhonchi/ rales.  CV: regular rate and rhythm, S1, S2, no murmur  Ext: no edema, color normal, vascularity normal, temperature normal    Assessment and Plan:   The following treatment plan was discussed   1. Essential hypertension   controlled.  She did develop a rash after starting hydrochlorothiazide but this appears consistent with perioral dermatitis and I do not think is related to the medication.  Will treat as discussed below.  Continue current medication for now, consider change if no improvement in rash.   2. Perioral dermatitis   erythematous rash with small vesicles consistent with perioral dermatitis.  Will start doxycycline the patient may contact me with an update in 2 weeks.  If no improvement would consider discontinuation of hydrochlorothiazide  doxycycline (VIBRAMYCIN) 100 MG Tab   3. Mcneil's esophagus determined by endoscopy   long-term risks associated with uncontrolled reflux reviewed including dysplasia.  I would recommend the patient continue with daily medication, she is in agreement.  Refill sent.  She will be due for follow-up EGD next year  pantoprazole (PROTONIX) 40 MG Tablet Delayed Response   4. Gastroesophageal reflux disease with esophagitis  pantoprazole (PROTONIX) 40 MG Tablet Delayed Response   5. Hypothyroidism due to Hashimoto's thyroiditis   stable  ARMOUR THYROID 90 MG Tab       Followup: 2 weeks, sooner as needed         Please note that this dictation was created using voice " recognition software. I have worked with consultants from the vendor as well as technical experts from [facility] to optimize the interface. I have made every reasonable attempt to correct obvious errors, but I expect that there are errors of grammar and possibly content that I did not discover before finalizing the note.

## 2018-12-20 ENCOUNTER — APPOINTMENT (RX ONLY)
Dept: URBAN - METROPOLITAN AREA CLINIC 20 | Facility: CLINIC | Age: 48
Setting detail: DERMATOLOGY
End: 2018-12-20

## 2018-12-20 DIAGNOSIS — D18.0 HEMANGIOMA: ICD-10-CM

## 2018-12-20 DIAGNOSIS — D22 MELANOCYTIC NEVI: ICD-10-CM

## 2018-12-20 DIAGNOSIS — L82.1 OTHER SEBORRHEIC KERATOSIS: ICD-10-CM

## 2018-12-20 DIAGNOSIS — L81.4 OTHER MELANIN HYPERPIGMENTATION: ICD-10-CM

## 2018-12-20 DIAGNOSIS — L57.8 OTHER SKIN CHANGES DUE TO CHRONIC EXPOSURE TO NONIONIZING RADIATION: ICD-10-CM

## 2018-12-20 PROBLEM — D22.5 MELANOCYTIC NEVI OF TRUNK: Status: ACTIVE | Noted: 2018-12-20

## 2018-12-20 PROBLEM — D48.5 NEOPLASM OF UNCERTAIN BEHAVIOR OF SKIN: Status: ACTIVE | Noted: 2018-12-20

## 2018-12-20 PROBLEM — D18.01 HEMANGIOMA OF SKIN AND SUBCUTANEOUS TISSUE: Status: ACTIVE | Noted: 2018-12-20

## 2018-12-20 PROCEDURE — ? BIOPSY BY SHAVE METHOD

## 2018-12-20 PROCEDURE — ? COUNSELING

## 2018-12-20 PROCEDURE — 99214 OFFICE O/P EST MOD 30 MIN: CPT | Mod: 25

## 2018-12-20 PROCEDURE — 11100: CPT

## 2018-12-20 ASSESSMENT — LOCATION DETAILED DESCRIPTION DERM
LOCATION DETAILED: RIGHT ANTERIOR DISTAL THIGH
LOCATION DETAILED: RIGHT SUPERIOR MEDIAL UPPER BACK
LOCATION DETAILED: RIGHT DISTAL CALF
LOCATION DETAILED: RIGHT PROXIMAL DORSAL FOREARM
LOCATION DETAILED: LEFT PROXIMAL DORSAL FOREARM
LOCATION DETAILED: RIGHT INFERIOR UPPER BACK
LOCATION DETAILED: RIGHT ANTERIOR PROXIMAL UPPER ARM
LOCATION DETAILED: RIGHT CENTRAL MALAR CHEEK
LOCATION DETAILED: RIGHT MID-UPPER BACK
LOCATION DETAILED: LEFT ANTERIOR PROXIMAL UPPER ARM
LOCATION DETAILED: LEFT INFERIOR CENTRAL MALAR CHEEK
LOCATION DETAILED: LEFT ANTERIOR DISTAL THIGH
LOCATION DETAILED: LEFT MEDIAL SUPERIOR CHEST

## 2018-12-20 ASSESSMENT — LOCATION SIMPLE DESCRIPTION DERM
LOCATION SIMPLE: RIGHT UPPER BACK
LOCATION SIMPLE: LEFT UPPER ARM
LOCATION SIMPLE: LEFT FOREARM
LOCATION SIMPLE: RIGHT THIGH
LOCATION SIMPLE: RIGHT CALF
LOCATION SIMPLE: RIGHT UPPER ARM
LOCATION SIMPLE: LEFT THIGH
LOCATION SIMPLE: RIGHT CHEEK
LOCATION SIMPLE: RIGHT FOREARM
LOCATION SIMPLE: CHEST
LOCATION SIMPLE: LEFT CHEEK

## 2018-12-20 ASSESSMENT — LOCATION ZONE DERM
LOCATION ZONE: FACE
LOCATION ZONE: LEG
LOCATION ZONE: TRUNK
LOCATION ZONE: ARM

## 2018-12-20 NOTE — PROCEDURE: BIOPSY BY SHAVE METHOD
Notification Instructions: Patient will be notified of biopsy results. However, patient instructed to call the office if not contacted within 2 weeks.
Hemostasis: Drysol and Electrocautery
Bill For Surgical Tray: no
Lab: [id number]
Curettage Text: The wound bed was treated with curettage after the biopsy was performed.
Biopsy Method: Personna blade
Detail Level: Detailed
Silver Nitrate Text: The wound bed was treated with silver nitrate after the biopsy was performed.
Lab Facility: 
X Size Of Lesion In Cm: 0.5
Anesthesia Type: 1% lidocaine with 1:100,000 epinephrine and a 1:6 solution of 8.4% sodium bicarbonate
Cryotherapy Text: The wound bed was treated with cryotherapy after the biopsy was performed.
Depth Of Biopsy: dermis
Consent: Written consent was obtained and risks were reviewed including but not limited to scarring, infection, bleeding, scabbing, incomplete removal, nerve damage and allergy to anesthesia.
Wound Care: Aquaphor
Additional Anesthesia Volume In Cc (Will Not Render If 0): 0
Electrodesiccation Text: The wound bed was treated with electrodesiccation after the biopsy was performed.
Biopsy Type: H and E
Anesthesia Volume In Cc: 0.2
Post-Care Instructions: I reviewed with the patient in detail post-care instructions. Patient is to keep the biopsy site dry overnight, and then apply bacitracin twice daily until healed. Patient may apply hydrogen peroxide soaks to remove any crusting.
Dressing: Band-Aid
Billing Type: Third-Party Bill
Type Of Destruction Used: Curettage
Electrodesiccation And Curettage Text: The wound bed was treated with electrodesiccation and curettage after the biopsy was performed.
Was A Bandage Applied: Yes

## 2019-05-20 ENCOUNTER — HOSPITAL ENCOUNTER (OUTPATIENT)
Dept: LAB | Facility: MEDICAL CENTER | Age: 49
End: 2019-05-20
Attending: INTERNAL MEDICINE
Payer: COMMERCIAL

## 2019-05-20 ENCOUNTER — HOSPITAL ENCOUNTER (OUTPATIENT)
Dept: LAB | Facility: MEDICAL CENTER | Age: 49
End: 2019-05-20
Attending: SPECIALIST
Payer: COMMERCIAL

## 2019-05-20 LAB
25(OH)D3 SERPL-MCNC: 25 NG/ML (ref 30–100)
25(OH)D3 SERPL-MCNC: 30 NG/ML (ref 30–100)
ALBUMIN SERPL BCP-MCNC: 4 G/DL (ref 3.2–4.9)
ALBUMIN/GLOB SERPL: 1.5 G/DL
ALP SERPL-CCNC: 46 U/L (ref 30–99)
ALT SERPL-CCNC: 22 U/L (ref 2–50)
ANION GAP SERPL CALC-SCNC: 9 MMOL/L (ref 0–11.9)
AST SERPL-CCNC: 20 U/L (ref 12–45)
BASOPHILS # BLD AUTO: 1.3 % (ref 0–1.8)
BASOPHILS # BLD: 0.1 K/UL (ref 0–0.12)
BILIRUB SERPL-MCNC: 0.6 MG/DL (ref 0.1–1.5)
BUN SERPL-MCNC: 16 MG/DL (ref 8–22)
CALCIUM SERPL-MCNC: 8.9 MG/DL (ref 8.5–10.5)
CHLORIDE SERPL-SCNC: 106 MMOL/L (ref 96–112)
CHOLEST SERPL-MCNC: 182 MG/DL (ref 100–199)
CO2 SERPL-SCNC: 25 MMOL/L (ref 20–33)
CREAT SERPL-MCNC: 0.72 MG/DL (ref 0.5–1.4)
EOSINOPHIL # BLD AUTO: 0.24 K/UL (ref 0–0.51)
EOSINOPHIL NFR BLD: 3.2 % (ref 0–6.9)
ERYTHROCYTE [DISTWIDTH] IN BLOOD BY AUTOMATED COUNT: 39.9 FL (ref 35.9–50)
ERYTHROCYTE [DISTWIDTH] IN BLOOD BY AUTOMATED COUNT: 40.1 FL (ref 35.9–50)
ESTRADIOL SERPL-MCNC: 83 PG/ML
FASTING STATUS PATIENT QL REPORTED: NORMAL
FSH SERPL-ACNC: 7.3 MIU/ML
GLOBULIN SER CALC-MCNC: 2.7 G/DL (ref 1.9–3.5)
GLUCOSE SERPL-MCNC: 85 MG/DL (ref 65–99)
HCT VFR BLD AUTO: 42.1 % (ref 37–47)
HCT VFR BLD AUTO: 42.4 % (ref 37–47)
HDLC SERPL-MCNC: 39 MG/DL
HGB BLD-MCNC: 13.9 G/DL (ref 12–16)
HGB BLD-MCNC: 14 G/DL (ref 12–16)
IMM GRANULOCYTES # BLD AUTO: 0.02 K/UL (ref 0–0.11)
IMM GRANULOCYTES NFR BLD AUTO: 0.3 % (ref 0–0.9)
LDLC SERPL CALC-MCNC: 92 MG/DL
LYMPHOCYTES # BLD AUTO: 2.13 K/UL (ref 1–4.8)
LYMPHOCYTES NFR BLD: 28.2 % (ref 22–41)
MCH RBC QN AUTO: 30.2 PG (ref 27–33)
MCH RBC QN AUTO: 30.7 PG (ref 27–33)
MCHC RBC AUTO-ENTMCNC: 32.8 G/DL (ref 33.6–35)
MCHC RBC AUTO-ENTMCNC: 33.3 G/DL (ref 33.6–35)
MCV RBC AUTO: 92 FL (ref 81.4–97.8)
MCV RBC AUTO: 92.3 FL (ref 81.4–97.8)
MONOCYTES # BLD AUTO: 0.55 K/UL (ref 0–0.85)
MONOCYTES NFR BLD AUTO: 7.3 % (ref 0–13.4)
NEUTROPHILS # BLD AUTO: 4.5 K/UL (ref 2–7.15)
NEUTROPHILS NFR BLD: 59.7 % (ref 44–72)
NRBC # BLD AUTO: 0 K/UL
NRBC BLD-RTO: 0 /100 WBC
PLATELET # BLD AUTO: 330 K/UL (ref 164–446)
PLATELET # BLD AUTO: 342 K/UL (ref 164–446)
PMV BLD AUTO: 10.6 FL (ref 9–12.9)
PMV BLD AUTO: 10.6 FL (ref 9–12.9)
POTASSIUM SERPL-SCNC: 3.8 MMOL/L (ref 3.6–5.5)
PROT SERPL-MCNC: 6.7 G/DL (ref 6–8.2)
RBC # BLD AUTO: 4.56 M/UL (ref 4.2–5.4)
RBC # BLD AUTO: 4.61 M/UL (ref 4.2–5.4)
SODIUM SERPL-SCNC: 140 MMOL/L (ref 135–145)
T3FREE SERPL-MCNC: 3.1 PG/ML (ref 2.4–4.2)
T4 SERPL-MCNC: 8.3 UG/DL (ref 4–12)
TESTOST SERPL-MCNC: 49 NG/DL (ref 9–75)
THYROPEROXIDASE AB SERPL-ACNC: <0.2 IU/ML (ref 0–9)
TRIGL SERPL-MCNC: 257 MG/DL (ref 0–149)
TSH SERPL DL<=0.005 MIU/L-ACNC: 1.98 UIU/ML (ref 0.38–5.33)
VIT B12 SERPL-MCNC: 386 PG/ML (ref 211–911)
VIT B12 SERPL-MCNC: 390 PG/ML (ref 211–911)
WBC # BLD AUTO: 7.5 K/UL (ref 4.8–10.8)
WBC # BLD AUTO: 7.5 K/UL (ref 4.8–10.8)

## 2019-05-20 PROCEDURE — 83516 IMMUNOASSAY NONANTIBODY: CPT

## 2019-05-20 PROCEDURE — 84443 ASSAY THYROID STIM HORMONE: CPT

## 2019-05-20 PROCEDURE — 80061 LIPID PANEL: CPT

## 2019-05-20 PROCEDURE — 80053 COMPREHEN METABOLIC PANEL: CPT

## 2019-05-20 PROCEDURE — 83550 IRON BINDING TEST: CPT

## 2019-05-20 PROCEDURE — 82306 VITAMIN D 25 HYDROXY: CPT | Mod: 91

## 2019-05-20 PROCEDURE — 83540 ASSAY OF IRON: CPT

## 2019-05-20 PROCEDURE — 82670 ASSAY OF TOTAL ESTRADIOL: CPT

## 2019-05-20 PROCEDURE — 84403 ASSAY OF TOTAL TESTOSTERONE: CPT

## 2019-05-20 PROCEDURE — 86376 MICROSOMAL ANTIBODY EACH: CPT

## 2019-05-20 PROCEDURE — 36415 COLL VENOUS BLD VENIPUNCTURE: CPT

## 2019-05-20 PROCEDURE — 82607 VITAMIN B-12: CPT | Mod: 91

## 2019-05-20 PROCEDURE — 85027 COMPLETE CBC AUTOMATED: CPT

## 2019-05-20 PROCEDURE — 85007 BL SMEAR W/DIFF WBC COUNT: CPT

## 2019-05-20 PROCEDURE — 83735 ASSAY OF MAGNESIUM: CPT

## 2019-05-20 PROCEDURE — 82306 VITAMIN D 25 HYDROXY: CPT

## 2019-05-20 PROCEDURE — 85025 COMPLETE CBC W/AUTO DIFF WBC: CPT

## 2019-05-20 PROCEDURE — 82607 VITAMIN B-12: CPT

## 2019-05-20 PROCEDURE — 84481 FREE ASSAY (FT-3): CPT

## 2019-05-20 PROCEDURE — 84436 ASSAY OF TOTAL THYROXINE: CPT

## 2019-05-20 PROCEDURE — 83001 ASSAY OF GONADOTROPIN (FSH): CPT

## 2019-05-20 PROCEDURE — 80053 COMPREHEN METABOLIC PANEL: CPT | Mod: 91

## 2019-05-21 LAB
ALBUMIN SERPL BCP-MCNC: 3.9 G/DL (ref 3.2–4.9)
ALBUMIN/GLOB SERPL: 1.4 G/DL
ALP SERPL-CCNC: 49 U/L (ref 30–99)
ALT SERPL-CCNC: 22 U/L (ref 2–50)
ANION GAP SERPL CALC-SCNC: 11 MMOL/L (ref 0–11.9)
AST SERPL-CCNC: 22 U/L (ref 12–45)
BASOPHILS # BLD AUTO: 0.9 % (ref 0–1.8)
BASOPHILS # BLD: 0.07 K/UL (ref 0–0.12)
BILIRUB SERPL-MCNC: 0.6 MG/DL (ref 0.1–1.5)
BUN SERPL-MCNC: 16 MG/DL (ref 8–22)
CALCIUM SERPL-MCNC: 9.2 MG/DL (ref 8.5–10.5)
CHLORIDE SERPL-SCNC: 105 MMOL/L (ref 96–112)
CO2 SERPL-SCNC: 24 MMOL/L (ref 20–33)
CREAT SERPL-MCNC: 0.77 MG/DL (ref 0.5–1.4)
EOSINOPHIL # BLD AUTO: 0.34 K/UL (ref 0–0.51)
EOSINOPHIL NFR BLD: 4.5 % (ref 0–6.9)
FASTING STATUS PATIENT QL REPORTED: NORMAL
GLOBULIN SER CALC-MCNC: 2.7 G/DL (ref 1.9–3.5)
GLUCOSE SERPL-MCNC: 83 MG/DL (ref 65–99)
IRON SATN MFR SERPL: 26 % (ref 15–55)
IRON SERPL-MCNC: 99 UG/DL (ref 40–170)
LYMPHOCYTES # BLD AUTO: 2.84 K/UL (ref 1–4.8)
LYMPHOCYTES NFR BLD: 37.8 % (ref 22–41)
MAGNESIUM SERPL-MCNC: 1.9 MG/DL (ref 1.5–2.5)
MANUAL DIFF BLD: NORMAL
METAMYELOCYTES NFR BLD MANUAL: 0.9 %
MONOCYTES # BLD AUTO: 0.34 K/UL (ref 0–0.85)
MONOCYTES NFR BLD AUTO: 4.5 % (ref 0–13.4)
MYELOCYTES NFR BLD MANUAL: 0.9 %
NEUTROPHILS # BLD AUTO: 3.65 K/UL (ref 2–7.15)
NEUTROPHILS NFR BLD: 48.7 % (ref 44–72)
NEUTS BAND NFR BLD MANUAL: 1.8 % (ref 0–10)
POTASSIUM SERPL-SCNC: 3.8 MMOL/L (ref 3.6–5.5)
PROT SERPL-MCNC: 6.6 G/DL (ref 6–8.2)
SODIUM SERPL-SCNC: 140 MMOL/L (ref 135–145)
TIBC SERPL-MCNC: 381 UG/DL (ref 250–450)

## 2019-05-22 LAB — TTG IGA SER IA-ACNC: 1 U/ML (ref 0–3)

## 2019-06-20 ENCOUNTER — OFFICE VISIT (OUTPATIENT)
Dept: MEDICAL GROUP | Facility: MEDICAL CENTER | Age: 49
End: 2019-06-20
Payer: COMMERCIAL

## 2019-06-20 VITALS
HEART RATE: 98 BPM | OXYGEN SATURATION: 100 % | BODY MASS INDEX: 31.89 KG/M2 | WEIGHT: 180 LBS | RESPIRATION RATE: 16 BRPM | SYSTOLIC BLOOD PRESSURE: 122 MMHG | TEMPERATURE: 97.5 F | HEIGHT: 63 IN | DIASTOLIC BLOOD PRESSURE: 82 MMHG

## 2019-06-20 DIAGNOSIS — L72.9 SUBCUTANEOUS CYST: ICD-10-CM

## 2019-06-20 PROCEDURE — 99213 OFFICE O/P EST LOW 20 MIN: CPT | Performed by: NURSE PRACTITIONER

## 2019-06-20 RX ORDER — RANITIDINE 300 MG/1
300 TABLET ORAL DAILY
COMMUNITY
End: 2019-11-08

## 2019-06-20 RX ORDER — BUPROPION HYDROCHLORIDE 150 MG/1
150 TABLET ORAL EVERY MORNING
Qty: 30 TAB | Refills: 1 | Status: SHIPPED | OUTPATIENT
Start: 2019-06-20 | End: 2019-07-19 | Stop reason: SDUPTHER

## 2019-06-20 ASSESSMENT — PATIENT HEALTH QUESTIONNAIRE - PHQ9: CLINICAL INTERPRETATION OF PHQ2 SCORE: 0

## 2019-06-21 NOTE — PROGRESS NOTES
"Subjective:     Chief Complaint   Patient presents with   • Cyst     BUMP ON RIGHT SIDE OF THE SHOULDER      Nancie Ugalde is a 48 y.o. female here today to follow up on:    Body mass index 32.0-32.9, adult  Gradual weight gain now up 10 lb from last visit. Admits to overeating \"verge of binging sometimes\", emotional eating. She saw a commercial for contrave and is wondering if this may be helpful for her. She has tried diets in the past but has difficulty sticking with it and does not self control. Difficulty with portion control   Exercising 3 days a week  No history of HTN, seizure. No feelings of anxiety/ depression    Subcutaneous cyst  Growth on the R anterior shoulder which she first noticed about 6 weeks ago, has about doubled in sized since that time. It is right where her bra strap lies, this puts pressure on the area and is causing discomfort. She also notes that it is uncomfortable when exercising. No overlying erythema, no drainage       Current medicines (including changes today)  Current Outpatient Prescriptions   Medication Sig Dispense Refill   • ranitidine (ZANTAC) 300 MG tablet Take 300 mg by mouth every day.     • buPROPion (WELLBUTRIN XL) 150 MG XL tablet Take 1 Tab by mouth every morning. 30 Tab 1   • pantoprazole (PROTONIX) 40 MG Tablet Delayed Response Take 1 Tab by mouth every day. 90 Tab 3   • ARMOUR THYROID 90 MG Tab Take 1 Tab by mouth every day. 90 Tab 3   • lisinopril-hydrochlorothiazide (PRINZIDE, ZESTORETIC) 20-12.5 MG per tablet Take 2 Tabs by mouth every day. 60 Tab 11   • doxycycline (VIBRAMYCIN) 100 MG Tab Take 1 Tab by mouth 2 times a day. 28 Tab 0   • Misc Natural Products (PROGESTERONE EX) by Apply externally route.     • ibuprofen (MOTRIN) 600 MG Tab Take 1 Tab by mouth every 6 hours as needed. 30 Tab 3     No current facility-administered medications for this visit.      She  has a past medical history of Disorder of thyroid; GERD (gastroesophageal reflux disease); " "Gynecological disorder; Hypertension; and Indigestion.    ROS included above     Objective:     /82 (BP Location: Left arm, Patient Position: Sitting, BP Cuff Size: Adult)   Pulse 98   Temp 36.4 °C (97.5 °F) (Temporal)   Resp 16   Ht 1.588 m (5' 2.52\")   Wt 81.6 kg (180 lb)   SpO2 100%  Body mass index is 32.38 kg/m².     Physical Exam:  General: Alert, oriented in no acute distress.  Eye contact is good, speech is normal, affect calm  Lungs: clear to auscultation bilaterally, normal effort, no wheeze/ rhonchi/ rales.  CV: regular rate and rhythm, S1, S2, no murmur  Abdomen: soft, nontender  Ext: R anterior shoulder with mobile tender 2 cm mass at the are of the bra strap. no edema, color normal, vascularity normal, temperature normal    Assessment and Plan:   The following treatment plan was discussed   1. Adult BMI 32.0-32.9 kg/sq m  She is interested in medication to help with binge/ emotional eating. She saw a commercial for contrave and asks about this. Pricing and insurance coverage discussed. We may instead try wellbutrin which should be beneficial for her. Risks and SE reviewed. Encouraged calorie counting, mindful eating. F/u in 1 month    buPROPion (WELLBUTRIN XL) 150 MG XL tablet   2. Subcutaneous cyst  Painful and growing nodule on the shoulder at the location of bra strap, she is interested in removal  REFERRAL TO GENERAL SURGERY       Followup: 1 month         Please note that this dictation was created using voice recognition software. I have worked with consultants from the vendor as well as technical experts from [employer] to optimize the interface. I have made every reasonable attempt to correct obvious errors, but I expect that there are errors of grammar and possibly content that I did not discover before finalizing the note.       "

## 2019-06-21 NOTE — ASSESSMENT & PLAN NOTE
Growth on the R anterior shoulder which she first noticed about 6 weeks ago, has about doubled in sized since that time. It is right where her bra strap lies, this puts pressure on the area and is causing discomfort. She also notes that it is uncomfortable when exercising. No overlying erythema, no drainage

## 2019-06-21 NOTE — ASSESSMENT & PLAN NOTE
"Gradual weight gain now up 10 lb from last visit. Admits to overeating \"verge of binging sometimes\", emotional eating. She saw a commercial for contrave and is wondering if this may be helpful for her. She has tried diets in the past but has difficulty sticking with it and does not self control. Difficulty with portion control   Exercising 3 days a week  No history of HTN, seizure. No feelings of anxiety/ depression  "

## 2019-07-04 ENCOUNTER — PATIENT MESSAGE (OUTPATIENT)
Dept: MEDICAL GROUP | Facility: MEDICAL CENTER | Age: 49
End: 2019-07-04

## 2019-07-04 DIAGNOSIS — I10 ESSENTIAL HYPERTENSION: ICD-10-CM

## 2019-07-04 RX ORDER — LISINOPRIL AND HYDROCHLOROTHIAZIDE 20; 12.5 MG/1; MG/1
2 TABLET ORAL DAILY
Qty: 60 TAB | Refills: 11 | Status: SHIPPED | OUTPATIENT
Start: 2019-07-04 | End: 2020-02-04

## 2019-07-04 NOTE — TELEPHONE ENCOUNTER
From: Nancie Ugalde  To: LOLIS Reyes  Sent: 2019 10:14 AM PDT  Subject: Prescription Question    Can you please refill my lisinopril? I have one refill but it is  and the pharmacy won't fill it.  Thank you!

## 2019-07-19 ENCOUNTER — OFFICE VISIT (OUTPATIENT)
Dept: MEDICAL GROUP | Facility: MEDICAL CENTER | Age: 49
End: 2019-07-19
Payer: COMMERCIAL

## 2019-07-19 VITALS
HEART RATE: 93 BPM | WEIGHT: 176 LBS | HEIGHT: 63 IN | RESPIRATION RATE: 16 BRPM | SYSTOLIC BLOOD PRESSURE: 128 MMHG | BODY MASS INDEX: 31.18 KG/M2 | OXYGEN SATURATION: 96 % | TEMPERATURE: 97 F | DIASTOLIC BLOOD PRESSURE: 88 MMHG

## 2019-07-19 DIAGNOSIS — R92.30 DENSE BREAST TISSUE: ICD-10-CM

## 2019-07-19 DIAGNOSIS — J06.9 VIRAL URI WITH COUGH: ICD-10-CM

## 2019-07-19 DIAGNOSIS — Z12.39 SCREENING FOR BREAST CANCER: ICD-10-CM

## 2019-07-19 PROCEDURE — 99213 OFFICE O/P EST LOW 20 MIN: CPT | Performed by: NURSE PRACTITIONER

## 2019-07-19 RX ORDER — BUPROPION HYDROCHLORIDE 150 MG/1
150 TABLET ORAL EVERY MORNING
Qty: 90 TAB | Refills: 3 | Status: SHIPPED | OUTPATIENT
Start: 2019-07-19 | End: 2019-11-08

## 2019-07-19 NOTE — ASSESSMENT & PLAN NOTE
Symptoms starting about a week ago, her granddaughter had been sick as well.  Seems to be gradually improving.  No reported shortness of breath, fever, chest pain, nausea

## 2019-07-19 NOTE — ASSESSMENT & PLAN NOTE
She has been struggling with her weight for several years, was seen for this a month ago.  In discussion she revealed that she was having difficulty controlling her cravings, was over eating frequently and we started trial of Wellbutrin and she is here today to follow-up on that.  She does feel that medication is helping to control her tendency to overeat or emotionally eat.  She has dropped 4 pounds from her last visit.  She has joined [employer] and is exercising regularly.  She is tolerating medication without side effects.  No concerns with nausea, headache.  Her blood pressure is slightly higher than her usual on the initial reading but reduced with second reading of 128/88.  She is currently having cold symptoms which she feels may be contributing.

## 2019-07-19 NOTE — PROGRESS NOTES
Subjective:     Chief Complaint   Patient presents with   • Follow-Up     MED REVIEW      Nancie Ugalde is a 48 y.o. female here today to follow up on:    Body mass index 32.0-32.9, adult  She has been struggling with her weight for several years, was seen for this a month ago.  In discussion she revealed that she was having difficulty controlling her cravings, was over eating frequently and we started trial of Wellbutrin and she is here today to follow-up on that.  She does feel that medication is helping to control her tendency to overeat or emotionally eat.  She has dropped 4 pounds from her last visit.  She has joined [employer] and is exercising regularly.  She is tolerating medication without side effects.  No concerns with nausea, headache.  Her blood pressure is slightly higher than her usual on the initial reading but reduced with second reading of 128/88.  She is currently having cold symptoms which she feels may be contributing.    Viral URI with cough  Symptoms starting about a week ago, her granddaughter had been sick as well.  Seems to be gradually improving.  No reported shortness of breath, fever, chest pain, nausea     She is due for breast cancer screening     current medicines (including changes today)  Current Outpatient Prescriptions   Medication Sig Dispense Refill   • buPROPion (WELLBUTRIN XL) 150 MG XL tablet Take 1 Tab by mouth every morning. 90 Tab 3   • lisinopril-hydrochlorothiazide (PRINZIDE, ZESTORETIC) 20-12.5 MG per tablet Take 2 Tabs by mouth every day. 60 Tab 11   • ranitidine (ZANTAC) 300 MG tablet Take 300 mg by mouth every day.     • pantoprazole (PROTONIX) 40 MG Tablet Delayed Response Take 1 Tab by mouth every day. 90 Tab 3   • ARMOUR THYROID 90 MG Tab Take 1 Tab by mouth every day. 90 Tab 3   • doxycycline (VIBRAMYCIN) 100 MG Tab Take 1 Tab by mouth 2 times a day. 28 Tab 0   • Misc Natural Products (PROGESTERONE EX) by Apply externally route.     • ibuprofen (MOTRIN) 600 MG  "Tab Take 1 Tab by mouth every 6 hours as needed. (Patient not taking: Reported on 7/19/2019) 30 Tab 3     No current facility-administered medications for this visit.      She  has a past medical history of Disorder of thyroid; GERD (gastroesophageal reflux disease); Gynecological disorder; Hypertension; and Indigestion.    ROS included above     Objective:     /88 (BP Location: Left arm, Patient Position: Sitting, BP Cuff Size: Adult)   Pulse 93   Temp 36.1 °C (97 °F) (Temporal)   Resp 16   Ht 1.588 m (5' 2.5\")   Wt 79.8 kg (176 lb)   SpO2 96%  Body mass index is 31.68 kg/m².     Physical Exam:  General: Alert, oriented in no acute distress.  Eye contact is good, speech is normal, affect calm  HEENT: Oral mucosa pink moist, no lesions. TMs gray with good landmarks bilaterally. No lymphadenopathy.  Lungs: clear to auscultation bilaterally, normal effort, no wheeze/ rhonchi/ rales.  CV: regular rate and rhythm, S1, S2, no murmur  Ext: no edema, color normal, vascularity normal, temperature normal    Assessment and Plan:   The following treatment plan was discussed   1. Adult BMI 32.0-32.9 kg/sq m   she is benefiting from her Wellbutrin, feels that it is helping to control her tendency to overeat.  Continue with current dose, encouraged to continue with diet and exercise regimen  buPROPion (WELLBUTRIN XL) 150 MG XL tablet   2. Screening for breast cancer  MA-SCREEN MAMMO W/CAD-BILAT    US-SCREENING WHOLE BREAST BILATERAL (3D SCREENING)   3. Dense breast tissue  MA-SCREEN MAMMO W/CAD-BILAT    US-SCREENING WHOLE BREAST BILATERAL (3D SCREENING)        4. Viral URI with cough   lungs clear on exam, appears to be resolving at this point.  Continue supportive therapy       Followup: No Follow-up on file.         Please note that this dictation was created using voice recognition software. I have worked with consultants from the vendor as well as technical experts from [employer] to optimize the interface. I " have made every reasonable attempt to correct obvious errors, but I expect that there are errors of grammar and possibly content that I did not discover before finalizing the note.

## 2019-08-09 ENCOUNTER — HOSPITAL ENCOUNTER (OUTPATIENT)
Dept: RADIOLOGY | Facility: MEDICAL CENTER | Age: 49
End: 2019-08-09
Attending: NURSE PRACTITIONER
Payer: COMMERCIAL

## 2019-08-09 DIAGNOSIS — Z12.31 VISIT FOR SCREENING MAMMOGRAM: ICD-10-CM

## 2019-08-09 PROCEDURE — 77063 BREAST TOMOSYNTHESIS BI: CPT

## 2019-08-19 ENCOUNTER — HOSPITAL ENCOUNTER (OUTPATIENT)
Dept: LAB | Facility: MEDICAL CENTER | Age: 49
End: 2019-08-19
Payer: COMMERCIAL

## 2019-08-19 LAB
BDY FAT % MEASURED: 40.9 %
BP DIAS: 79 MMHG
BP SYS: 127 MMHG
CHOLEST SERPL-MCNC: 160 MG/DL (ref 100–199)
DIABETES HTDIA: NO
EVENT NAME HTEVT: NORMAL
FASTING HTFAS: YES
GLUCOSE SERPL-MCNC: 96 MG/DL (ref 65–99)
HDLC SERPL-MCNC: 37 MG/DL
HYPERTENSION HTHYP: YES
LDLC SERPL CALC-MCNC: 92 MG/DL
SCREENING LOC CITY HTCIT: NORMAL
SCREENING LOC STATE HTSTA: NORMAL
SCREENING LOCATION HTLOC: NORMAL
SMOKING HTSMO: NO
SUBSCRIBER ID HTSID: NORMAL
TRIGL SERPL-MCNC: 155 MG/DL (ref 0–149)

## 2019-08-19 PROCEDURE — 80061 LIPID PANEL: CPT

## 2019-08-19 PROCEDURE — S5190 WELLNESS ASSESSMENT BY NONPH: HCPCS

## 2019-08-19 PROCEDURE — 82947 ASSAY GLUCOSE BLOOD QUANT: CPT

## 2019-08-19 PROCEDURE — 36415 COLL VENOUS BLD VENIPUNCTURE: CPT

## 2019-09-03 NOTE — ASSESSMENT & PLAN NOTE
BP now at goal on lisinopril-hctz 20-12.5 milligrams daily  No chest pain, dizziness, palpitation  
Erythematous rash around the mouth starting around the same time the hydrochlorothiazide was added to her medications.  She is getting small fluid-filled vesicles.  She is not changed hygiene products.  She is avoiding wearing makeup when able but rash is not improving.  
Last EGD 2016  
Recent labs reviewed.  Stable on [place] Thyroid 90 mg daily.  Good energy level.  No constipation, diarrhea, heat or cold intolerance  
brought in by [facility] as transfer from [facility] for cardiology eval with heparin gtt infusing via pump at 7.6 cc per hour; wt at [facility] reported as 140 lbs; per ems hep bolus given at 0430 and gtt started at 0445; pt also received at other hospital: 325 asa, 300 plavix, 80 lipitor, 2 sl ntg, 2 mg morphine; ems reports trop neg and ekg neg; pt on ACS nomogram; rate adjusted here to 7.5 cc per hour due to 7.6 cc per hour is not on our nomogram; pt is alert and oriented x 4 with mild left chest pain but improved; skin warm and dry; pt has hx of cabg and hyperlipidemia; # 20 g iv site from other hosp intact and functional; 2nd line placed #18 to right ac here; EKG done upon arrival and placed on cardiac monitor.

## 2019-09-30 ENCOUNTER — DOCUMENTATION (OUTPATIENT)
Dept: OCCUPATIONAL MEDICINE | Facility: CLINIC | Age: 49
End: 2019-09-30

## 2019-10-01 ENCOUNTER — IMMUNIZATION (OUTPATIENT)
Dept: OCCUPATIONAL MEDICINE | Facility: CLINIC | Age: 49
End: 2019-10-01

## 2019-10-01 DIAGNOSIS — Z23 NEED FOR VACCINATION: ICD-10-CM

## 2019-10-01 PROCEDURE — 90686 IIV4 VACC NO PRSV 0.5 ML IM: CPT | Performed by: NURSE PRACTITIONER

## 2019-10-09 ENCOUNTER — EH NON-PROVIDER (OUTPATIENT)
Dept: OCCUPATIONAL MEDICINE | Facility: CLINIC | Age: 49
End: 2019-10-09

## 2019-10-09 DIAGNOSIS — Z02.89 ENCOUNTER FOR OCCUPATIONAL HEALTH EXAMINATION INVOLVING RESPIRATOR: Primary | ICD-10-CM

## 2019-10-09 PROCEDURE — 94375 RESPIRATORY FLOW VOLUME LOOP: CPT | Performed by: NURSE PRACTITIONER

## 2019-11-08 ENCOUNTER — HOSPITAL ENCOUNTER (EMERGENCY)
Facility: MEDICAL CENTER | Age: 49
End: 2019-11-08
Attending: EMERGENCY MEDICINE
Payer: COMMERCIAL

## 2019-11-08 ENCOUNTER — APPOINTMENT (OUTPATIENT)
Dept: RADIOLOGY | Facility: MEDICAL CENTER | Age: 49
End: 2019-11-08
Attending: EMERGENCY MEDICINE
Payer: COMMERCIAL

## 2019-11-08 VITALS
OXYGEN SATURATION: 93 % | BODY MASS INDEX: 30.87 KG/M2 | HEART RATE: 96 BPM | TEMPERATURE: 97 F | DIASTOLIC BLOOD PRESSURE: 59 MMHG | RESPIRATION RATE: 18 BRPM | SYSTOLIC BLOOD PRESSURE: 119 MMHG | WEIGHT: 167.77 LBS | HEIGHT: 62 IN

## 2019-11-08 DIAGNOSIS — I82.431 ACUTE DEEP VEIN THROMBOSIS (DVT) OF POPLITEAL VEIN OF RIGHT LOWER EXTREMITY (HCC): ICD-10-CM

## 2019-11-08 LAB
ANION GAP SERPL CALC-SCNC: 13 MMOL/L (ref 0–11.9)
APTT PPP: 33 SEC (ref 24.7–36)
BASOPHILS # BLD AUTO: 0.9 % (ref 0–1.8)
BASOPHILS # BLD: 0.13 K/UL (ref 0–0.12)
BUN SERPL-MCNC: 9 MG/DL (ref 8–22)
CALCIUM SERPL-MCNC: 9.6 MG/DL (ref 8.4–10.2)
CHLORIDE SERPL-SCNC: 103 MMOL/L (ref 96–112)
CO2 SERPL-SCNC: 23 MMOL/L (ref 20–33)
CREAT SERPL-MCNC: 0.6 MG/DL (ref 0.5–1.4)
EOSINOPHIL # BLD AUTO: 0.74 K/UL (ref 0–0.51)
EOSINOPHIL NFR BLD: 5.3 % (ref 0–6.9)
ERYTHROCYTE [DISTWIDTH] IN BLOOD BY AUTOMATED COUNT: 41 FL (ref 35.9–50)
GLUCOSE SERPL-MCNC: 102 MG/DL (ref 65–99)
HCT VFR BLD AUTO: 33.7 % (ref 37–47)
HGB BLD-MCNC: 11 G/DL (ref 12–16)
IMM GRANULOCYTES # BLD AUTO: 0.21 K/UL (ref 0–0.11)
IMM GRANULOCYTES NFR BLD AUTO: 1.5 % (ref 0–0.9)
INR PPP: 1.01 (ref 0.87–1.13)
LYMPHOCYTES # BLD AUTO: 2.36 K/UL (ref 1–4.8)
LYMPHOCYTES NFR BLD: 16.9 % (ref 22–41)
MCH RBC QN AUTO: 30.3 PG (ref 27–33)
MCHC RBC AUTO-ENTMCNC: 32.6 G/DL (ref 33.6–35)
MCV RBC AUTO: 92.8 FL (ref 81.4–97.8)
MONOCYTES # BLD AUTO: 0.68 K/UL (ref 0–0.85)
MONOCYTES NFR BLD AUTO: 4.9 % (ref 0–13.4)
NEUTROPHILS # BLD AUTO: 9.85 K/UL (ref 2–7.15)
NEUTROPHILS NFR BLD: 70.5 % (ref 44–72)
NRBC # BLD AUTO: 0 K/UL
NRBC BLD-RTO: 0 /100 WBC
PLATELET # BLD AUTO: 455 K/UL (ref 164–446)
PMV BLD AUTO: 10 FL (ref 9–12.9)
POTASSIUM SERPL-SCNC: 4.1 MMOL/L (ref 3.6–5.5)
PROTHROMBIN TIME: 13.4 SEC (ref 12–14.6)
RBC # BLD AUTO: 3.63 M/UL (ref 4.2–5.4)
SODIUM SERPL-SCNC: 139 MMOL/L (ref 135–145)
WBC # BLD AUTO: 14 K/UL (ref 4.8–10.8)

## 2019-11-08 PROCEDURE — 85025 COMPLETE CBC W/AUTO DIFF WBC: CPT

## 2019-11-08 PROCEDURE — A9270 NON-COVERED ITEM OR SERVICE: HCPCS | Performed by: EMERGENCY MEDICINE

## 2019-11-08 PROCEDURE — 99284 EMERGENCY DEPT VISIT MOD MDM: CPT

## 2019-11-08 PROCEDURE — 93971 EXTREMITY STUDY: CPT | Mod: 26 | Performed by: INTERNAL MEDICINE

## 2019-11-08 PROCEDURE — 85610 PROTHROMBIN TIME: CPT

## 2019-11-08 PROCEDURE — 93971 EXTREMITY STUDY: CPT | Mod: RT

## 2019-11-08 PROCEDURE — 80048 BASIC METABOLIC PNL TOTAL CA: CPT

## 2019-11-08 PROCEDURE — 36415 COLL VENOUS BLD VENIPUNCTURE: CPT

## 2019-11-08 PROCEDURE — 700102 HCHG RX REV CODE 250 W/ 637 OVERRIDE(OP): Performed by: EMERGENCY MEDICINE

## 2019-11-08 PROCEDURE — 85730 THROMBOPLASTIN TIME PARTIAL: CPT

## 2019-11-08 RX ORDER — GABAPENTIN 300 MG/1
300 CAPSULE ORAL
Status: SHIPPED | COMMUNITY
End: 2019-11-14

## 2019-11-08 RX ADMIN — RIVAROXABAN 15 MG: 15 TABLET, FILM COATED ORAL at 10:45

## 2019-11-08 ASSESSMENT — PAIN SCALES - WONG BAKER: WONGBAKER_NUMERICALRESPONSE: HURTS JUST A LITTLE BIT

## 2019-11-08 NOTE — ED PROVIDER NOTES
CHIEF COMPLAINT  Chief Complaint   Patient presents with   • Leg Pain     R calf       HPI  Nancie Ugalde is a 49 y.o. female who presents to the Emergency Department with a history of recent surgery, 11 days ago, for breast reduction and abdominoplasty.  She has been having right calf pain for the last 3 days which she states feels more like a cramp to her than anything else.  There is been no redness no swelling no wounds.  She has no history of DVT or other clotting disorder that she knows of.  She denies any chest pain shortness of breath or other symptomatology        REVIEW OF SYSTEMS  Positive for leg pain, Negative for chest pain shortness of breath erythema of her leg or wound.  As above all other systems are negative.    PAST MEDICAL HISTORY   has a past medical history of Disorder of thyroid, GERD (gastroesophageal reflux disease), Gynecological disorder, Hypertension, and Indigestion.    FAMILY HISTORY  Family History   Problem Relation Age of Onset   • Lung Disease Mother         copd   • Hypertension Mother    • Alcohol/Drug Mother    • Lung Cancer Father    • Lung Disease Father         cancer   • Alcohol/Drug Father    • Heart Disease Maternal Grandmother         Fatal MI at 48, sisters had CVAs        SOCIAL HISTORY  Social History     Tobacco Use   • Smoking status: Former Smoker     Packs/day: 1.00     Years: 15.00     Pack years: 15.00     Types: Cigarettes     Last attempt to quit:      Years since quittin.8   • Smokeless tobacco: Never Used   Substance and Sexual Activity   • Alcohol use: Yes     Alcohol/week: 4.2 oz     Types: 7 Glasses of wine per week   • Drug use: No   • Sexual activity: Yes     Partners: Male     Birth control/protection: Surgical       SURGICAL HISTORY   has a past surgical history that includes other abdominal surgery; gyn surgery; tubal ligation; hysterectomy laparoscopy (Bilateral, 2016); vaginal suspension (N/A, 2016); and cystoscopy (N/A,  "9/21/2016).    CURRENT MEDICATIONS  Reviewed.  See Encounter Summary.   ALLERGIES  Allergies   Allergen Reactions   • Nkda [No Known Drug Allergy]        PHYSICAL EXAM  VITAL SIGNS: /59   Pulse 96   Temp 36.1 °C (97 °F) (Temporal)   Resp 18   Ht 1.575 m (5' 2\")   Wt 76.1 kg (167 lb 12.3 oz)   SpO2 93%   BMI 30.69 kg/m²   Constitutional: Alert, able to answer questions  HENT: Nose is normal in appearance, external ears are normal,  moist mucous membranes  Eyes: Anicteric,  pupils are equal round and reactive, there is no conjunctival drainage or pallor   Neck: The trachea is midline, there is no obvious mass or meningeal signs  Cardiovascular: Good perfusion,  regular rate and rhythm without murmurs gallops or rubs  Thorax & Lungs: Respiratory rate and effort are normal. There is normal chest excursion with respiration.  No wheezes rhonchi or rales noted.  Abdomen: Abdomen is protected with a corset type device secondary to her recent surgery which I did not undo  Musculoskeletal: No deformities noted in all 4 extremities.  No gross edema or swelling noted  Skin: Visualized skin is warm without rash.  Neurologic:  Cranial nerves II through XII are intact there is no focal abnormality noted.  Psychiatric: Normal mood and mentation    RADIOLOGY/PROCEDURES  Imaging Studies:    US-EXTREMITY VENOUS LOWER UNILAT RIGHT           Popliteal DVT    Pertinent Labs   Results for orders placed or performed during the hospital encounter of 11/08/19   BMP   Result Value Ref Range    Sodium 139 135 - 145 mmol/L    Potassium 4.1 3.6 - 5.5 mmol/L    Chloride 103 96 - 112 mmol/L    Co2 23 20 - 33 mmol/L    Glucose 102 (H) 65 - 99 mg/dL    Bun 9 8 - 22 mg/dL    Creatinine 0.60 0.50 - 1.40 mg/dL    Calcium 9.6 8.4 - 10.2 mg/dL    Anion Gap 13.0 (H) 0.0 - 11.9   ESTIMATED GFR   Result Value Ref Range    GFR If African American >60 >60 mL/min/1.73 m 2    GFR If Non African American >60 >60 mL/min/1.73 m 2   CBC WITH " DIFFERENTIAL   Result Value Ref Range    WBC 14.0 (H) 4.8 - 10.8 K/uL    RBC 3.63 (L) 4.20 - 5.40 M/uL    Hemoglobin 11.0 (L) 12.0 - 16.0 g/dL    Hematocrit 33.7 (L) 37.0 - 47.0 %    MCV 92.8 81.4 - 97.8 fL    MCH 30.3 27.0 - 33.0 pg    MCHC 32.6 (L) 33.6 - 35.0 g/dL    RDW 41.0 35.9 - 50.0 fL    Platelet Count 455 (H) 164 - 446 K/uL    MPV 10.0 9.0 - 12.9 fL    Neutrophils-Polys 70.50 44.00 - 72.00 %    Lymphocytes 16.90 (L) 22.00 - 41.00 %    Monocytes 4.90 0.00 - 13.40 %    Eosinophils 5.30 0.00 - 6.90 %    Basophils 0.90 0.00 - 1.80 %    Immature Granulocytes 1.50 (H) 0.00 - 0.90 %    Nucleated RBC 0.00 /100 WBC    Neutrophils (Absolute) 9.85 (H) 2.00 - 7.15 K/uL    Lymphs (Absolute) 2.36 1.00 - 4.80 K/uL    Monos (Absolute) 0.68 0.00 - 0.85 K/uL    Eos (Absolute) 0.74 (H) 0.00 - 0.51 K/uL    Baso (Absolute) 0.13 (H) 0.00 - 0.12 K/uL    Immature Granulocytes (abs) 0.21 (H) 0.00 - 0.11 K/uL    NRBC (Absolute) 0.00 K/uL   PROTHROMBIN TIME   Result Value Ref Range    PT 13.4 12.0 - 14.6 sec    INR 1.01 0.87 - 1.13   APTT   Result Value Ref Range    APTT 33.0 24.7 - 36.0 sec           COURSE & MEDICAL DECISION MAKING  Nursing notes and vital signs were reviewed. (See chart for details)  The patients  records were reviewed, history was obtained from the patient and her ;     The patient presents with leg swelling, and the differential diagnosis includes but is not limited to DVT, hypokalemia electrolyte derangement muscular strain.       Initial orders in the Emergency Department included CBC CMP INR PT, ultrasound right lower extremity     ED testing reveals popliteal DVT.  Because the patient had a precipitating event with her recent surgery no anticoagulation work-up was initiated.  I had a discussion with the patient about starting Xarelto therapy and following up with the anticoagulation clinic, she was made aware of the risks of anticoagulation to return if she has any head injury or active bleeding, she  will call the anticoagulation clinic to make sure her care is established on Monday.  Additionally I did speak with her surgeon who is aware of her DVT        FINAL IMPRESSION  1.  Right lower extremity DVT, popliteal         DISPOSITION  Home in stable condition        FOLLOW UP:  Kat Frederick A.P.R.N.  [address]  [address]  [facility] [address]  [phone]      call [phone] to verify appointment for first follow-up visit    Thelma Silva M.D.  [address]  [facility] [address]  [phone]            OUTPATIENT MEDICATIONS:  Discharge Medication List as of 11/8/2019 11:09 AM      START taking these medications    Details   rivaroxaban (XARELTO) 15 MG Tab tablet Take 1 Tab by mouth 2 Times a Day for 21 days., Disp-42 Tab, R-0, Print Rx Paper               The patient was discharged home with an information sheet on DVT and told to return immediately for any signs or symptoms listed, but specifically if swelling, or any worsening at all.  The patient verbally agreed to the discharge precautions and follow-up plan which is documented in EPIC.    Electronically signed by: Brenda Thurman, 11/8/2019 12:22 PM

## 2019-11-08 NOTE — ED TRIAGE NOTES
Pt amb to triage c/o R calf pain x3d; pt post-op day #11  For abdominoplasty and breast reduction.

## 2019-11-08 NOTE — ED NOTES
"Chief Complaint   Patient presents with   • Leg Pain     R calf     Pt reports that pain is worse upon waking. States that it feel dull and cramping.  Pt sent by MD as she is 11 days post op. Awaiting ERP eval.   /68   Pulse (!) 118   Temp 36.1 °C (97 °F) (Temporal)   Resp 17   Ht 1.575 m (5' 2\")   Wt 76.1 kg (167 lb 12.3 oz)   SpO2 96%   "

## 2019-11-08 NOTE — ED NOTES
All discharge instructions given to pt as well as Rx for xarelto.  Pt advised not to drink or drive.    Pt verbalized understanding of all discharge instructions. All lines removed prior to discharge. All questions answered.

## 2019-11-14 ENCOUNTER — ANTICOAGULATION VISIT (OUTPATIENT)
Dept: MEDICAL GROUP | Facility: MEDICAL CENTER | Age: 49
End: 2019-11-14
Payer: COMMERCIAL

## 2019-11-14 ENCOUNTER — TELEPHONE (OUTPATIENT)
Dept: VASCULAR LAB | Facility: MEDICAL CENTER | Age: 49
End: 2019-11-14

## 2019-11-14 DIAGNOSIS — I82.431 ACUTE DEEP VEIN THROMBOSIS (DVT) OF POPLITEAL VEIN OF RIGHT LOWER EXTREMITY (HCC): ICD-10-CM

## 2019-11-14 PROBLEM — I82.409 DEEP VEIN THROMBOSIS (HCC): Status: ACTIVE | Noted: 2019-11-14

## 2019-11-14 PROCEDURE — 99211 OFF/OP EST MAY X REQ PHY/QHP: CPT | Performed by: PHYSICIAN ASSISTANT

## 2019-11-14 NOTE — TELEPHONE ENCOUNTER
Initial anticoagulation clinic note and urgent care note reviewed.  Patient started on anticoagulation with Xarelto for acute DVT, popliteal and below.  Apparently provoked by recent surgery    Pending further recommendations, we will continue with 3 months of oral anticoagulation and then check back with PCP to see if it can be discontinued after that time    Will defer any indicated age appropriate screening for occult malignancy to pcp.    Michael J. Bloch, MD  Anticoagulation Center    Cc: jonah redding

## 2019-11-14 NOTE — PROGRESS NOTES
Anticoagulation Summary  As of 11/14/2019    INR goal:      TTR:   --   INR used for dosing:   No new INR was available at the time of this encounter.   Warfarin maintenance plan:   No maintenance plan   Next INR check:      Target end date:       Indications    Deep vein thrombosis (HCC) [I82.409]             Anticoagulation Episode Summary     INR check location:       Preferred lab:       Send INR reminders to:       Comments:   Xarelto      Anticoagulation Care Providers     Provider Role Specialty Phone number    Brenda Thurman M.D. Referring Emergency Medicine [phone]    [facility] Responsible  [phone]           Pt is new to Xarelto and new to [facility].  Discussed indication for drug therapy.  Explained our services, hours of operation, Xarelto therapy, potential SE. Discussed monitoring parameters, such as blood in urine, blood in stool, discussed what to do if a dose is missed, or suspected as missed.  Pt to continue with current Xarelto dosing regimen. Pt denies any unusual s/s of bleeding, bruising, clotting or any changes to diet or medications.    Patient with RLE DVT as as seen on ultrasound: The popliteal vein is incompressible with acute appearing softly echogenic material in the lumen, poorly attached at the proximal portion. A posterior tibial & the peroneal veins are incompressible with acute appearing material in the lumen.    Pt denies hx of coagulopathies or previous VTE. This DVT was thought to be provoked by recent surgery (breast reduction and abdominoplasty) on 10/28.    Patient was started on Xarelto 15mg twice daily on 11/8/19. Pt was able to  Xarelto 15 mg tabs for $0 copay d/t coupon card.    Per referring provider, patient's duration of therapy is TBD. Pt will be seeing PCP on 11/20.    Patients CrCl >60 mL/min, patient on 15 mg twice daily for 21 days, then to transition to 20 mg daily on 11/29/19.    Medication reconciliation done.     Potential  DI - none    Discussed risks of pregnancy while on anticoagulation.     Follow up with patient on 11/25 before transitioning to 20mg daily. Sent RX of Xarelto 20 mg daily to pharmacy; will f/u with copay at next appt.    Added [facility] to Care Team    Renu Sabillon, PharmD    CC Dr. Bloch

## 2019-11-25 ENCOUNTER — ANTICOAGULATION VISIT (OUTPATIENT)
Dept: MEDICAL GROUP | Facility: MEDICAL CENTER | Age: 49
End: 2019-11-25
Payer: COMMERCIAL

## 2019-11-25 DIAGNOSIS — I82.439 ACUTE DEEP VEIN THROMBOSIS (DVT) OF POPLITEAL VEIN, UNSPECIFIED LATERALITY (HCC): ICD-10-CM

## 2019-11-25 PROCEDURE — 99211 OFF/OP EST MAY X REQ PHY/QHP: CPT | Performed by: INTERNAL MEDICINE

## 2019-11-26 NOTE — PROGRESS NOTES
OP Anticoagulation Service Note       Target end date:3 months.      Indication: DVT     Drug: Xarelto 20 mg once daily    Health Status Since Last Assessment   Patient denies any new relevant medical problems, ED visits or hospitalizations   Patient denies any embolic events (stroke/tia/systemic embolism)    Adherence with DOAC Therapy   Pt  has 0 missed any doses in the average week   Pt understand importance of not missing doses of DOAC and increased risk of thrombosis with frequent missed doses     Bleeding Risk Assessment     Denies Epistaxis   Pt denies any excessive or unusual bleeding/hematomas.    Pt denies any GI bleeds or hematemesis.     Pt denies any concerning daily headache or sub dural hematoma symptoms.   Pt denies any hematuria or abnormal vaginal bleeding.   Latest Hemoglobin 11.0   ETOH overuse occasional drink     Creatinine Clearance/Renal Function     Latest ClCr >30     Lab Results   Component Value Date    SODIUM 139 11/08/2019    POTASSIUM 4.1 11/08/2019    CHLORIDE 103 11/08/2019    CO2 23 11/08/2019    ANION 13.0 (H) 11/08/2019    GLUCOSE 102 (H) 11/08/2019    BUN 9 11/08/2019    CREATININE 0.60 11/08/2019    CALCIUM 9.6 11/08/2019    ASTSGOT 20 05/20/2019    ALTSGPT 22 05/20/2019    ALKPHOSPHAT 46 05/20/2019    TBILIRUBIN 0.6 05/20/2019    ALBUMIN 4.0 05/20/2019    AGRATIO 1.5 05/20/2019    TSHULTRASEN 1.980 05/20/2019        Drug Interactions   Platelets: 455   ASA/other antiplatelets noen   NSAID none   Other drug interactions none    Verified no anticonvulsant or azole therapy, education provided for future use.     Examination   Symptomatic hypotension none   Significant gait impairment/imbalance/high risk for falls? none   Any falls or accidents since last visit? none     Final Assessment and Recommendations:   Patient appears stable from the anticoagulation staindpoint.    Medication is affordable - yes $30 per month   Reviewed with pt major s/s of bleeding and to seek immediate  medical attention  for any bad falls, accidents or if they hit his head    Benefits of continued DOAC therapy outweigh risks for this patient   Recommend pt continue with current DOAC therapy    Reminded pt not to stop anticoagulation with out speaking with a medical   Other Actions: LOT visit at 3 month follow up    Follow up:   Will follow up with patient in 2 months.      Amada Whaley, PharmD

## 2019-12-19 DIAGNOSIS — E03.8 HYPOTHYROIDISM DUE TO HASHIMOTO'S THYROIDITIS: ICD-10-CM

## 2019-12-19 DIAGNOSIS — E06.3 HYPOTHYROIDISM DUE TO HASHIMOTO'S THYROIDITIS: ICD-10-CM

## 2019-12-20 RX ORDER — THYROID,PORK 90 MG
90 TABLET ORAL DAILY
Qty: 90 TAB | Refills: 3 | Status: SHIPPED | OUTPATIENT
Start: 2019-12-20 | End: 2021-02-08

## 2020-01-20 DIAGNOSIS — K21.00 GASTROESOPHAGEAL REFLUX DISEASE WITH ESOPHAGITIS: ICD-10-CM

## 2020-01-20 DIAGNOSIS — K22.70 BARRETT'S ESOPHAGUS DETERMINED BY ENDOSCOPY: ICD-10-CM

## 2020-01-21 ENCOUNTER — OFFICE VISIT (OUTPATIENT)
Dept: MEDICAL GROUP | Facility: MEDICAL CENTER | Age: 50
End: 2020-01-21
Payer: COMMERCIAL

## 2020-01-21 VITALS
HEIGHT: 63 IN | SYSTOLIC BLOOD PRESSURE: 110 MMHG | DIASTOLIC BLOOD PRESSURE: 82 MMHG | WEIGHT: 162 LBS | TEMPERATURE: 96.6 F | OXYGEN SATURATION: 96 % | RESPIRATION RATE: 16 BRPM | BODY MASS INDEX: 28.7 KG/M2 | HEART RATE: 98 BPM

## 2020-01-21 DIAGNOSIS — I82.439 ACUTE DEEP VEIN THROMBOSIS (DVT) OF POPLITEAL VEIN, UNSPECIFIED LATERALITY (HCC): ICD-10-CM

## 2020-01-21 DIAGNOSIS — I10 ESSENTIAL HYPERTENSION: ICD-10-CM

## 2020-01-21 DIAGNOSIS — R04.0 EPISTAXIS: ICD-10-CM

## 2020-01-21 PROCEDURE — 99214 OFFICE O/P EST MOD 30 MIN: CPT | Performed by: NURSE PRACTITIONER

## 2020-01-21 RX ORDER — PANTOPRAZOLE SODIUM 40 MG/1
TABLET, DELAYED RELEASE ORAL
Qty: 90 TAB | Refills: 3 | Status: SHIPPED | OUTPATIENT
Start: 2020-01-21 | End: 2021-02-08

## 2020-01-21 ASSESSMENT — PATIENT HEALTH QUESTIONNAIRE - PHQ9: CLINICAL INTERPRETATION OF PHQ2 SCORE: 0

## 2020-01-21 NOTE — ASSESSMENT & PLAN NOTE
Right lower extremity DVT diagnosed 11/8/2019, occurred in the setting of breast reduction and abdominoplasty.  She was started on Xarelto at that time and has been doing well with this up until the last week or so when she started having nosebleeds.  The pain in her calf is resolved, there is been no swelling.  No bruising, petechiae, blood with bowel movements

## 2020-01-21 NOTE — ASSESSMENT & PLAN NOTE
Chronic issue managed with lisinopril hydrochlorothiazide 20-12.5 mg 2 tabs daily.  She is having more episodes of dizziness and fatigue, she has had substantial weight loss.  Possible that we can reduce her medication

## 2020-01-21 NOTE — ASSESSMENT & PLAN NOTE
Several episodes of bloody nose over the last week, typically resolving within 5 minutes.  She denies any nasal pain, headaches, excessive nose blowing

## 2020-01-21 NOTE — PROGRESS NOTES
"Subjective:     Chief Complaint   Patient presents with   • Follow-Up     follow up on xarelto medication      Nancie Ugalde is a 49 y.o. female here today to follow up on:    Essential hypertension  Chronic issue managed with lisinopril hydrochlorothiazide 20-12.5 mg 2 tabs daily.  She is having more episodes of dizziness and fatigue, she has had substantial weight loss.  Possible that we can reduce her medication    Deep vein thrombosis (HCC)  Right lower extremity DVT diagnosed 11/8/2019, occurred in the setting of breast reduction and abdominoplasty.  She was started on Xarelto at that time and has been doing well with this up until the last week or so when she started having nosebleeds.  The pain in her calf is resolved, there is been no swelling.  No bruising, petechiae, blood with bowel movements    Epistaxis  Several episodes of bloody nose over the last week, typically resolving within 5 minutes.  She denies any nasal pain, headaches, excessive nose blowing       Current medicines (including changes today)  Current Outpatient Medications   Medication Sig Dispense Refill   • ARMOUR THYROID 90 MG Tab Take 1 Tab by mouth every day. 90 Tab 3   • rivaroxaban (XARELTO) 20 MG Tab tablet Take 1 Tab by mouth with dinner. 30 Tab 2   • lisinopril-hydrochlorothiazide (PRINZIDE, ZESTORETIC) 20-12.5 MG per tablet Take 2 Tabs by mouth every day. 60 Tab 11   • pantoprazole (PROTONIX) 40 MG Tablet Delayed Response TAKE ONE TABLET BY MOUTH EVERY DAY 90 Tab 3     No current facility-administered medications for this visit.      She  has a past medical history of Disorder of thyroid, GERD (gastroesophageal reflux disease), Gynecological disorder, Hypertension, and Indigestion.    ROS included above     Objective:     /82 (BP Location: Left arm, Patient Position: Sitting, BP Cuff Size: Adult)   Pulse 98   Temp 35.9 °C (96.6 °F) (Temporal)   Resp 16   Ht 1.588 m (5' 2.5\")   Wt 73.5 kg (162 lb)   SpO2 96%  Body mass " index is 29.16 kg/m².     Physical Exam:  General: Alert, oriented in no acute distress.  Eye contact is good, speech is normal, affect calm  HEENT: Right nare with small scabbed lesion  Lungs: clear to auscultation bilaterally, normal effort, no wheeze/ rhonchi/ rales.  CV: regular rate and rhythm, S1, S2, no murmur  Ext: No tenderness over the right calf, no swelling or erythema.  Color normal, vascularity normal, temperature normal    Assessment and Plan:   The following treatment plan was discussed  1. Acute deep vein thrombosis (DVT) of popliteal vein, unspecified laterality (HCC)   noticed with right lower extremity DVT in November, she has been on Xarelto now for about 2-1/2 months with plan to discontinue at 3 months if resolved.  We will recheck ultrasound at the beginning of February, follow-up pending results  US-EXTREMITY VENOUS LOWER UNILAT RIGHT   2. Essential hypertension   sounds as though her blood pressure is overtreated now that she has lost weight and been exercising regularly.  May reduce to 1 tab daily on lisinopril hydrochlorothiazide, continue to monitor blood pressure and contact me with readings   3. Epistaxis   several episodes over the last week, resolving within 5 minutes or so.  Reassurance given, this will need to be temporarily managed until she can come off blood thinner.  Discussed nasal moisturization.  She will contact me if episodes are substantially worsening       Followup: 3 weeks, sooner as needed         Please note that this dictation was created using voice recognition software. I have worked with consultants from the vendor as well as technical experts from [employer] to optimize the interface. I have made every reasonable attempt to correct obvious errors, but I expect that there are errors of grammar and possibly content that I did not discover before finalizing the note.

## 2020-01-31 ENCOUNTER — HOSPITAL ENCOUNTER (OUTPATIENT)
Dept: RADIOLOGY | Facility: MEDICAL CENTER | Age: 50
End: 2020-01-31
Attending: NURSE PRACTITIONER
Payer: COMMERCIAL

## 2020-01-31 DIAGNOSIS — I82.439 ACUTE DEEP VEIN THROMBOSIS (DVT) OF POPLITEAL VEIN, UNSPECIFIED LATERALITY (HCC): ICD-10-CM

## 2020-01-31 PROCEDURE — 93971 EXTREMITY STUDY: CPT | Mod: RT

## 2020-01-31 PROCEDURE — 93971 EXTREMITY STUDY: CPT | Mod: 26 | Performed by: INTERNAL MEDICINE

## 2020-02-04 ENCOUNTER — OFFICE VISIT (OUTPATIENT)
Dept: MEDICAL GROUP | Facility: MEDICAL CENTER | Age: 50
End: 2020-02-04
Payer: COMMERCIAL

## 2020-02-04 VITALS
OXYGEN SATURATION: 96 % | RESPIRATION RATE: 16 BRPM | TEMPERATURE: 96.5 F | WEIGHT: 161.6 LBS | SYSTOLIC BLOOD PRESSURE: 128 MMHG | HEIGHT: 63 IN | HEART RATE: 98 BPM | BODY MASS INDEX: 28.63 KG/M2 | DIASTOLIC BLOOD PRESSURE: 88 MMHG

## 2020-02-04 DIAGNOSIS — K22.70 BARRETT'S ESOPHAGUS DETERMINED BY ENDOSCOPY: ICD-10-CM

## 2020-02-04 DIAGNOSIS — I10 ESSENTIAL HYPERTENSION: ICD-10-CM

## 2020-02-04 DIAGNOSIS — I82.439 ACUTE DEEP VEIN THROMBOSIS (DVT) OF POPLITEAL VEIN, UNSPECIFIED LATERALITY (HCC): ICD-10-CM

## 2020-02-04 DIAGNOSIS — E03.8 HYPOTHYROIDISM DUE TO HASHIMOTO'S THYROIDITIS: ICD-10-CM

## 2020-02-04 DIAGNOSIS — E06.3 HYPOTHYROIDISM DUE TO HASHIMOTO'S THYROIDITIS: ICD-10-CM

## 2020-02-04 PROCEDURE — 99214 OFFICE O/P EST MOD 30 MIN: CPT | Performed by: NURSE PRACTITIONER

## 2020-02-04 RX ORDER — LISINOPRIL AND HYDROCHLOROTHIAZIDE 20; 12.5 MG/1; MG/1
1 TABLET ORAL DAILY
Qty: 60 TAB | Refills: 11 | Status: SHIPPED | DISCHARGE
Start: 2020-02-04 | End: 2020-07-20 | Stop reason: SDUPTHER

## 2020-02-04 NOTE — ASSESSMENT & PLAN NOTE
Blood pressure has not been improving with her exercise regimen and weight loss, she has been able to reduce her lisinopril hydrochlorothiazide 20-12.5 mg from 2 tablets daily down to 1.

## 2020-02-04 NOTE — ASSESSMENT & PLAN NOTE
Right lower extremity DVT diagnosed 2019 which occurred in the setting of breast reduction and abdominoplasty.  No prior DVT or PE, no hormone use.  She has been on Xarelto since that time and just had ultrasound to recheck which shows resolution of the clot.  She would like to go off anticoagulation but is interested in lab work to rule out underlying hypercoagulopathy for peace of mind.  No bruising, bleeding.  Maternal grandmother  at the young age, unknown cause.  She is not aware of any family history of clotting disorder

## 2020-02-04 NOTE — PROGRESS NOTES
Subjective:     Chief Complaint   Patient presents with   • Follow-Up     Nancie Ugalde is a 49 y.o. female here today to follow up on:    Hypothyroidism due to Hashimoto's thyroiditis  Stable and doing well on [place] Thyroid 90 mg daily.  Energy level has been good, she has been working on weight loss with some success.  No complaints of constipation, hair loss, heat or cold intolerance    Mcneil's esophagus determined by endoscopy  Continues with pantoprazole, doing well    Essential hypertension  Blood pressure has not been improving with her exercise regimen and weight loss, she has been able to reduce her lisinopril hydrochlorothiazide 20-12.5 mg from 2 tablets daily down to 1.    Deep vein thrombosis (HCC)  Right lower extremity DVT diagnosed 2019 which occurred in the setting of breast reduction and abdominoplasty.  No prior DVT or PE, no hormone use.  She has been on Xarelto since that time and just had ultrasound to recheck which shows resolution of the clot.  She would like to go off anticoagulation but is interested in lab work to rule out underlying hypercoagulopathy for peace of mind.  No bruising, bleeding.  Maternal grandmother  at the young age, unknown cause.  She is not aware of any family history of clotting disorder       Current medicines (including changes today)  Current Outpatient Medications   Medication Sig Dispense Refill   • pantoprazole (PROTONIX) 40 MG Tablet Delayed Response TAKE ONE TABLET BY MOUTH EVERY DAY 90 Tab 3   • ARMOUR THYROID 90 MG Tab Take 1 Tab by mouth every day. 90 Tab 3   • rivaroxaban (XARELTO) 20 MG Tab tablet Take 1 Tab by mouth with dinner. 30 Tab 2   • lisinopril-hydrochlorothiazide (PRINZIDE, ZESTORETIC) 20-12.5 MG per tablet Take 2 Tabs by mouth every day. 60 Tab 11     No current facility-administered medications for this visit.      She  has a past medical history of Disorder of thyroid, GERD (gastroesophageal reflux disease), Gynecological  "disorder, Hypertension, and Indigestion.    ROS included above     Objective:     /88 (BP Location: Left arm, Patient Position: Sitting, BP Cuff Size: Adult)   Pulse 98   Temp 35.8 °C (96.5 °F) (Temporal)   Resp 16   Ht 1.588 m (5' 2.5\")   Wt 73.3 kg (161 lb 9.6 oz)   SpO2 96%  Body mass index is 29.09 kg/m².     Physical Exam:  General: Alert, oriented in no acute distress.  Eye contact is good, speech is normal, affect calm  Lungs: clear to auscultation bilaterally, normal effort, no wheeze/ rhonchi/ rales.  CV: regular rate and rhythm, S1, S2, no murmur  Ext: no lower extremity edema, no tenderness over the calf.  Color normal, vascularity normal, temperature normal    Assessment and Plan:   The following treatment plan was discussed   1. Acute deep vein thrombosis (DVT) of popliteal vein, unspecified laterality (HCC)   right lower extremity DVT occurring in the setting of surgery, no prior event.  DVT has resolved, we will check coagulation studies and likely discontinue Xarelto.  Discussed possibility of anticoagulation for future surgical procedure or other potential aggravating circumstance   2. Essential hypertension   stable, she has successfully reduced her medication with exercise and weight loss.  Continue to monitor   3. Hypothyroidism due to Hashimoto's thyroiditis   clinically euthyroid, update labs   4. Mcneil's esophagus determined by endoscopy   stable       Followup: Pending labs         Please note that this dictation was created using voice recognition software. I have worked with consultants from the vendor as well as technical experts from [employer] to optimize the interface. I have made every reasonable attempt to correct obvious errors, but I expect that there are errors of grammar and possibly content that I did not discover before finalizing the note.       "

## 2020-02-04 NOTE — ASSESSMENT & PLAN NOTE
Stable and doing well on [place] Thyroid 90 mg daily.  Energy level has been good, she has been working on weight loss with some success.  No complaints of constipation, hair loss, heat or cold intolerance

## 2020-02-06 ENCOUNTER — HOSPITAL ENCOUNTER (OUTPATIENT)
Dept: LAB | Facility: MEDICAL CENTER | Age: 50
End: 2020-02-06
Attending: NURSE PRACTITIONER
Payer: COMMERCIAL

## 2020-02-06 DIAGNOSIS — I82.439 ACUTE DEEP VEIN THROMBOSIS (DVT) OF POPLITEAL VEIN, UNSPECIFIED LATERALITY (HCC): ICD-10-CM

## 2020-02-06 LAB
APTT PPP: 29.8 SEC (ref 24.7–36)
T4 FREE SERPL-MCNC: 0.92 NG/DL (ref 0.53–1.43)
TSH SERPL DL<=0.005 MIU/L-ACNC: 1.48 UIU/ML (ref 0.38–5.33)

## 2020-02-06 PROCEDURE — 84439 ASSAY OF FREE THYROXINE: CPT

## 2020-02-06 PROCEDURE — 85303 CLOT INHIBIT PROT C ACTIVITY: CPT

## 2020-02-06 PROCEDURE — 86038 ANTINUCLEAR ANTIBODIES: CPT

## 2020-02-06 PROCEDURE — 36415 COLL VENOUS BLD VENIPUNCTURE: CPT

## 2020-02-06 PROCEDURE — 85306 CLOT INHIBIT PROT S FREE: CPT

## 2020-02-06 PROCEDURE — 84443 ASSAY THYROID STIM HORMONE: CPT

## 2020-02-06 PROCEDURE — 85730 THROMBOPLASTIN TIME PARTIAL: CPT

## 2020-02-06 PROCEDURE — 81241 F5 GENE: CPT

## 2020-02-06 PROCEDURE — 85300 ANTITHROMBIN III ACTIVITY: CPT

## 2020-02-06 PROCEDURE — 81240 F2 GENE: CPT

## 2020-02-07 LAB — NUCLEAR IGG SER QL IA: NORMAL

## 2020-02-08 LAB
PROT C ACT/NOR PPP: 129 % (ref 83–168)
PROT S ACT/NOR PPP: 101 % (ref 57–131)

## 2020-02-10 LAB
AT III ACT/NOR PPP CHRO: 105 % (ref 76–128)
F5 P.R506Q BLD/T QL: NEGATIVE

## 2020-02-11 LAB — F2 C.20210G>A GENO BLD/T: NEGATIVE

## 2020-02-16 ENCOUNTER — PATIENT MESSAGE (OUTPATIENT)
Dept: MEDICAL GROUP | Facility: MEDICAL CENTER | Age: 50
End: 2020-02-16

## 2020-02-18 ENCOUNTER — OFFICE VISIT (OUTPATIENT)
Dept: MEDICAL GROUP | Facility: MEDICAL CENTER | Age: 50
End: 2020-02-18
Payer: COMMERCIAL

## 2020-02-18 ENCOUNTER — HOSPITAL ENCOUNTER (OUTPATIENT)
Facility: MEDICAL CENTER | Age: 50
End: 2020-02-18
Attending: NURSE PRACTITIONER
Payer: COMMERCIAL

## 2020-02-18 VITALS
HEART RATE: 85 BPM | DIASTOLIC BLOOD PRESSURE: 88 MMHG | RESPIRATION RATE: 16 BRPM | HEIGHT: 63 IN | SYSTOLIC BLOOD PRESSURE: 122 MMHG | TEMPERATURE: 97.5 F | WEIGHT: 159.61 LBS | OXYGEN SATURATION: 96 % | BODY MASS INDEX: 28.28 KG/M2

## 2020-02-18 DIAGNOSIS — R39.15 URINARY URGENCY: ICD-10-CM

## 2020-02-18 DIAGNOSIS — R35.0 URINARY FREQUENCY: ICD-10-CM

## 2020-02-18 LAB
APPEARANCE UR: CLEAR
BILIRUB UR STRIP-MCNC: NEGATIVE MG/DL
COLOR UR AUTO: YELLOW
GLUCOSE UR STRIP.AUTO-MCNC: NEGATIVE MG/DL
KETONES UR STRIP.AUTO-MCNC: NORMAL MG/DL
LEUKOCYTE ESTERASE UR QL STRIP.AUTO: NORMAL
NITRITE UR QL STRIP.AUTO: NEGATIVE
PH UR STRIP.AUTO: 7 [PH] (ref 5–8)
PROT UR QL STRIP: NEGATIVE MG/DL
RBC UR QL AUTO: NEGATIVE
SP GR UR STRIP.AUTO: 1.02
UROBILINOGEN UR STRIP-MCNC: 0.2 MG/DL

## 2020-02-18 PROCEDURE — 81002 URINALYSIS NONAUTO W/O SCOPE: CPT | Performed by: NURSE PRACTITIONER

## 2020-02-18 PROCEDURE — 99213 OFFICE O/P EST LOW 20 MIN: CPT | Performed by: NURSE PRACTITIONER

## 2020-02-18 PROCEDURE — 87086 URINE CULTURE/COLONY COUNT: CPT

## 2020-02-18 PROCEDURE — 87186 SC STD MICRODIL/AGAR DIL: CPT

## 2020-02-18 PROCEDURE — 87077 CULTURE AEROBIC IDENTIFY: CPT

## 2020-02-18 RX ORDER — SULFAMETHOXAZOLE AND TRIMETHOPRIM 800; 160 MG/1; MG/1
1 TABLET ORAL 2 TIMES DAILY
Qty: 6 TAB | Refills: 0 | Status: SHIPPED | OUTPATIENT
Start: 2020-02-18 | End: 2020-02-20 | Stop reason: SDUPTHER

## 2020-02-18 NOTE — TELEPHONE ENCOUNTER
From: Nancie Ugalde  To: LOLIS Reyes  Sent: 2/16/2020 2:32 PM PST  Subject: Prescription Question    Gerber Stephens,  That's great news about the test results and I have stopped taking my xarelto.     I woke up this morning with a UTI. Can you prescribe an antibiotic or do you want me to make an appointment for the upcoming week?   Thanks   Nancie

## 2020-02-18 NOTE — PROGRESS NOTES
"Subjective:     Chief Complaint   Patient presents with   • UTI     Nancie Ugalde is a 49 y.o. female established patient here with concerns of dysuria, urinary frequency, and urinary urgency starting 3 days ago.  She is having burning toward the end of emptying her bladder, feels similar to when she had had a urinary tract infection in the past.  She did have sex prior and also had used a hot tub.  She denies fever, chills, abdominal pain, vaginal irritation or discharge, gross hematuria    Current medicines (including changes today)  Current Outpatient Medications   Medication Sig Dispense Refill   • sulfamethoxazole-trimethoprim (BACTRIM DS) 800-160 MG tablet Take 1 Tab by mouth 2 times a day. 6 Tab 0   • lisinopril-hydrochlorothiazide (PRINZIDE) 20-12.5 MG per tablet Take 1 Tab by mouth every day. 60 Tab 11   • pantoprazole (PROTONIX) 40 MG Tablet Delayed Response TAKE ONE TABLET BY MOUTH EVERY DAY 90 Tab 3   • ARMOUR THYROID 90 MG Tab Take 1 Tab by mouth every day. 90 Tab 3   • rivaroxaban (XARELTO) 20 MG Tab tablet Take 1 Tab by mouth with dinner. 30 Tab 2     No current facility-administered medications for this visit.      She  has a past medical history of Disorder of thyroid, GERD (gastroesophageal reflux disease), Gynecological disorder, Hypertension, and Indigestion.    ROS included above     Objective:     /88 (BP Location: Left arm, Patient Position: Sitting, BP Cuff Size: Adult)   Pulse 85   Temp 36.4 °C (97.5 °F) (Temporal)   Resp 16   Ht 1.588 m (5' 2.5\")   Wt 72.4 kg (159 lb 9.8 oz)   SpO2 96%  Body mass index is 28.73 kg/m².     Physical Exam:  General: Alert, oriented in no acute distress.  Eye contact is good, speech is normal, affect calm  Lungs: clear to auscultation bilaterally, normal effort, no wheeze/ rhonchi/ rales.  CV: regular rate and rhythm, S1, S2, no murmur  Abdomen: soft, nontender, No CVAT  Ext: no edema, color normal, vascularity normal, temperature " normal    Assessment and Plan:   The following treatment plan was discussed   1. Urinary frequency   UA today positive for leukocytes and nitrates, culture sent.  We will go ahead and start Bactrim, encouraged to push fluids.  Follow-up pending culture result  URINE CULTURE(NEW)    sulfamethoxazole-trimethoprim (BACTRIM DS) 800-160 MG tablet    POCT Urinalysis    URINE CULTURE(NEW)   2. Urinary urgency  POCT Urinalysis    URINE CULTURE(NEW)       Followup: Pending culture         Please note that this dictation was created using voice recognition software. I have worked with consultants from the vendor as well as technical experts from [facility] to optimize the interface. I have made every reasonable attempt to correct obvious errors, but I expect that there are errors of grammar and possibly content that I did not discover before finalizing the note.

## 2020-02-20 ENCOUNTER — PATIENT MESSAGE (OUTPATIENT)
Dept: MEDICAL GROUP | Facility: MEDICAL CENTER | Age: 50
End: 2020-02-20

## 2020-02-20 DIAGNOSIS — R35.0 URINARY FREQUENCY: ICD-10-CM

## 2020-02-20 LAB
BACTERIA UR CULT: ABNORMAL
BACTERIA UR CULT: ABNORMAL
SIGNIFICANT IND 70042: ABNORMAL
SITE SITE: ABNORMAL
SOURCE SOURCE: ABNORMAL

## 2020-02-20 RX ORDER — SULFAMETHOXAZOLE AND TRIMETHOPRIM 800; 160 MG/1; MG/1
1 TABLET ORAL 2 TIMES DAILY
Qty: 6 TAB | Refills: 0 | Status: SHIPPED
Start: 2020-02-20 | End: 2020-07-02

## 2020-02-20 NOTE — TELEPHONE ENCOUNTER
From: Nancie Ugalde  To: LOLIS Reyes  Sent: 2/20/2020 10:50 AM PST  Subject: Prescription Question    I have a question about Urine Culture resulted on 2/20/20, 10:07 AM.    Thank you. My prescription was for three days,I only have one pill left to take tonight at bedtime and I am still having symptoms.

## 2020-06-30 ENCOUNTER — PATIENT MESSAGE (OUTPATIENT)
Dept: MEDICAL GROUP | Facility: MEDICAL CENTER | Age: 50
End: 2020-06-30

## 2020-07-02 ENCOUNTER — OFFICE VISIT (OUTPATIENT)
Dept: MEDICAL GROUP | Facility: MEDICAL CENTER | Age: 50
End: 2020-07-02
Payer: COMMERCIAL

## 2020-07-02 VITALS
BODY MASS INDEX: 29.3 KG/M2 | OXYGEN SATURATION: 94 % | RESPIRATION RATE: 14 BRPM | SYSTOLIC BLOOD PRESSURE: 132 MMHG | HEIGHT: 63 IN | DIASTOLIC BLOOD PRESSURE: 76 MMHG | WEIGHT: 165.34 LBS | TEMPERATURE: 97.1 F | HEART RATE: 90 BPM

## 2020-07-02 DIAGNOSIS — I10 ESSENTIAL HYPERTENSION: ICD-10-CM

## 2020-07-02 DIAGNOSIS — K22.70 BARRETT'S ESOPHAGUS DETERMINED BY ENDOSCOPY: ICD-10-CM

## 2020-07-02 DIAGNOSIS — J45.20 MILD INTERMITTENT ASTHMA WITHOUT COMPLICATION: ICD-10-CM

## 2020-07-02 PROCEDURE — 99214 OFFICE O/P EST MOD 30 MIN: CPT | Performed by: NURSE PRACTITIONER

## 2020-07-02 RX ORDER — DEXAMETHASONE 4 MG/1
1 TABLET ORAL 2 TIMES DAILY
Qty: 1 INHALER | Refills: 5 | Status: SHIPPED | OUTPATIENT
Start: 2020-07-02 | End: 2020-08-07

## 2020-07-02 ASSESSMENT — FIBROSIS 4 INDEX: FIB4 SCORE: 0.46

## 2020-07-02 NOTE — ASSESSMENT & PLAN NOTE
Recent diagnosis on PFT.  She was having frequent cough, waking up at night coughing up to 4 days a week, feeling short of breath with activity.  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  She is here to discuss treatment options today.  No recent illness including fever, chills, malaise.  She did smoke when younger, approximately 14 years on and off.  She also had secondhand smoke exposure as a child.  No prior diagnosis of asthma

## 2020-07-02 NOTE — ASSESSMENT & PLAN NOTE
Borderline blood pressure in clinic today at 132/76.  Home readings 118-124/70-80.  She rarely has an elevated blood pressure at home  Taking 2 Lisinopril-hctz 20-12.5 mg daily.  No chest pain, dizziness

## 2020-07-02 NOTE — PROGRESS NOTES
Subjective:     Chief Complaint   Patient presents with   • Other     PFT , SOB, high heart rate     Nancie Ugalde is a 49 y.o. female here today to follow up on:    Essential hypertension  Borderline blood pressure in clinic today at 132/76.  Home readings 118-124/70-80.  She rarely has an elevated blood pressure at home  Taking 2 Lisinopril-hctz 20-12.5 mg daily.  No chest pain, dizziness    Mcneil's esophagus determined by endoscopy  Well-controlled at this time with pantoprazole, up-to-date on EGD.  No dysphasia    Mild intermittent asthma without complication  Recent diagnosis on PFT.  She was having frequent cough, waking up at night coughing up to 4 days a week, feeling short of breath with activity.  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  She is here to discuss treatment options today.  No recent illness including fever, chills, malaise.  She did smoke when younger, approximately 14 years on and off.  She also had secondhand smoke exposure as a child.  No prior diagnosis of asthma       Current medicines (including changes today)  Current Outpatient Medications   Medication Sig Dispense Refill   • fluticasone (FLOVENT HFA) 110 MCG/ACT Aerosol Inhale 1 Puff by mouth 2 times a day. 1 Inhaler 5   • lisinopril-hydrochlorothiazide (PRINZIDE) 20-12.5 MG per tablet Take 1 Tab by mouth every day. 60 Tab 11   • pantoprazole (PROTONIX) 40 MG Tablet Delayed Response TAKE ONE TABLET BY MOUTH EVERY DAY 90 Tab 3   • ARMOUR THYROID 90 MG Tab Take 1 Tab by mouth every day. 90 Tab 3     No current facility-administered medications for this visit.      She  has a past medical history of Disorder of thyroid, GERD (gastroesophageal reflux disease), Gynecological disorder, Hypertension, and Indigestion.    ROS included above     Objective:     /76 (BP Location: Right arm, Patient Position: Sitting, BP Cuff Size: Adult)   Pulse  "90   Temp 36.2 °C (97.1 °F) (Temporal)   Resp 14   Ht 1.6 m (5' 3\")   Wt 75 kg (165 lb 5.5 oz)   SpO2 94%  Body mass index is 29.29 kg/m².     Physical Exam:  General: Alert, oriented in no acute distress.  Eye contact is good, speech is normal, affect calm.  Lungs: clear to auscultation bilaterally, normal effort, no wheeze/ rhonchi/ rales.  CV: regular rate and rhythm, S1, S2, no murmur  Ext: no edema, color normal, vascularity normal, temperature normal    Assessment and Plan:   The following treatment plan was discussed   1. Essential hypertension   stable and home readings, continue current medication, continue to monitor   2. Mild intermittent asthma without complication   recent PFT reviewed, scanned into media.  She is having frequent cough and waking at night coughing despite good control of her acid reflux.  We will start trial of Flovent, may continue with albuterol as needed.  She will give me an update in 4 to 6 weeks  fluticasone (FLOVENT HFA) 110 MCG/ACT Aerosol   3. Mcneil's esophagus determined by endoscopy   well controlled with pantoprazole       Followup: 4 to 6 weeks via my chart       Please note that this dictation was created using voice recognition software. I have worked with consultants from the vendor as well as technical experts from [employer] to optimize the interface. I have made every reasonable attempt to correct obvious errors, but I expect that there are errors of grammar and possibly content that I did not discover before finalizing the note.       "

## 2020-07-15 ENCOUNTER — HOSPITAL ENCOUNTER (OUTPATIENT)
Facility: MEDICAL CENTER | Age: 50
End: 2020-07-15
Attending: NURSE PRACTITIONER
Payer: COMMERCIAL

## 2020-07-15 ENCOUNTER — NON-PROVIDER VISIT (OUTPATIENT)
Dept: OCCUPATIONAL MEDICINE | Facility: CLINIC | Age: 50
End: 2020-07-15

## 2020-07-15 DIAGNOSIS — Z11.59 ENCOUNTER FOR SCREENING FOR OTHER VIRAL DISEASES: ICD-10-CM

## 2020-07-15 LAB — COVID ORDER STATUS COVID19: NORMAL

## 2020-07-15 PROCEDURE — U0003 INFECTIOUS AGENT DETECTION BY NUCLEIC ACID (DNA OR RNA); SEVERE ACUTE RESPIRATORY SYNDROME CORONAVIRUS 2 (SARS-COV-2) (CORONAVIRUS DISEASE [COVID-19]), AMPLIFIED PROBE TECHNIQUE, MAKING USE OF HIGH THROUGHPUT TECHNOLOGIES AS DESCRIBED BY CMS-2020-01-R: HCPCS | Performed by: NURSE PRACTITIONER

## 2020-07-16 LAB
SARS-COV-2 RNA RESP QL NAA+PROBE: NOTDETECTED
SPECIMEN SOURCE: NORMAL

## 2020-07-17 ENCOUNTER — TELEPHONE (OUTPATIENT)
Dept: OCCUPATIONAL MEDICINE | Facility: CLINIC | Age: 50
End: 2020-07-17

## 2020-07-17 NOTE — TELEPHONE ENCOUNTER
Phone Number Called: [phone] (home) [phone] (work)      Call outcome: Spoke to patient regarding message below.    Message: COVID results are negative. Patient is negative. Patient stated that she already spoke with infection prevention.

## 2020-07-20 DIAGNOSIS — I10 ESSENTIAL HYPERTENSION: ICD-10-CM

## 2020-07-20 RX ORDER — LISINOPRIL AND HYDROCHLOROTHIAZIDE 20; 12.5 MG/1; MG/1
2 TABLET ORAL DAILY
Qty: 60 TAB | Refills: 11 | Status: SHIPPED | OUTPATIENT
Start: 2020-07-20 | End: 2021-08-02 | Stop reason: SDUPTHER

## 2020-07-21 NOTE — TELEPHONE ENCOUNTER
Received request via: Patient    Was the patient seen in the last year in this department? Yes    Does the patient have an active prescription (recently filled or refills available) for medication(s) requested? Yes. Appears to run all the way until February 2021, but patient has requested refill 3 times. Not sure if I am missing something.

## 2020-08-03 ENCOUNTER — PATIENT MESSAGE (OUTPATIENT)
Dept: MEDICAL GROUP | Facility: MEDICAL CENTER | Age: 50
End: 2020-08-03

## 2020-08-03 DIAGNOSIS — J45.20 MILD INTERMITTENT ASTHMA WITHOUT COMPLICATION: ICD-10-CM

## 2020-08-07 ENCOUNTER — OFFICE VISIT (OUTPATIENT)
Dept: PULMONOLOGY | Facility: HOSPICE | Age: 50
End: 2020-08-07
Payer: COMMERCIAL

## 2020-08-07 VITALS
DIASTOLIC BLOOD PRESSURE: 76 MMHG | SYSTOLIC BLOOD PRESSURE: 122 MMHG | OXYGEN SATURATION: 94 % | BODY MASS INDEX: 29.63 KG/M2 | HEIGHT: 63 IN | HEART RATE: 85 BPM | WEIGHT: 167.25 LBS

## 2020-08-07 DIAGNOSIS — R05.9 COUGH: ICD-10-CM

## 2020-08-07 PROCEDURE — 99213 OFFICE O/P EST LOW 20 MIN: CPT | Performed by: INTERNAL MEDICINE

## 2020-08-07 ASSESSMENT — FIBROSIS 4 INDEX: FIB4 SCORE: 0.46

## 2020-08-07 NOTE — PROGRESS NOTES
Pulmonary Consultation    Date of Service: 8/7/2020    Consulting Physician: LOLIS Reyes    Reason for consult:  Follow-Up (VANESSA. Last seen 10/24/2017) and Results (PFT 06/30/20)      Problem List Items Addressed This Visit     Cough     With evidence of obstruction and BD response on pFTs 6/30/20  Main concern is that her GERD behavioural modification is not being followed and this is what is resulting in asthma based on hx  Reviewed and given handouts on GERD behavioral modification  As flovent is not working trial asmanex and repeat spirometry  Pt will arrange follow up in a month         Relevant Orders    IGE+ALLERGENS ZONE 15(26)    DX-CHEST-2 VIEWS            History of Present Illness: Nancie Ugalde is a 49 y.o. female with a past medical history of cough and SOB worsening since beginning of the year  The cough was attributed to GERD and she had esophageal sphincter clipping but has not improved  She had PFTs done when she was doing QA at the PFT lab which did show obstruction with a BD response.  She has been tried on inhalers currently on flovent but does not seem to be helping. Tried symbicort which did not help.  She has lost 40 pounds intentionally  Is not compliant with GERD behavioral modification    Exercise tolerance:  Walking distance: SOB  [place]:SOB    Night time symptoms: when she lays down she has a cough    Pulmonary History:    Environmental exposures: no hot tub; no woodstove, no mining work, and no asbestos exposure    Pets: dogs  Occupation History: resp therapist  Family history of pulmonary disease: ye scopd  Second hand smoke exposure: yes    Review of Systems   Constitutional: Negative.    HENT: Negative.    Eyes: Negative.    Respiratory: Positive for cough and shortness of breath.    Cardiovascular: Negative.    Gastrointestinal: Negative.    Genitourinary: Negative.    Musculoskeletal: Negative.    Skin: Negative.    Neurological: Negative.     Endo/Heme/Allergies: Negative.    Psychiatric/Behavioral: Negative.        Current Outpatient Medications on File Prior to Visit   Medication Sig Dispense Refill   • ranitidine (ZANTAC) 300 MG tablet Take 300 mg by mouth every day.     • lisinopril-hydrochlorothiazide (PRINZIDE) 20-12.5 MG per tablet Take 2 Tabs by mouth every day. 60 Tab 11   • pantoprazole (PROTONIX) 40 MG Tablet Delayed Response TAKE ONE TABLET BY MOUTH EVERY DAY 90 Tab 3   • ARMOUR THYROID 90 MG Tab Take 1 Tab by mouth every day. 90 Tab 3     No current facility-administered medications on file prior to visit.        Social History     Tobacco Use   • Smoking status: Former Smoker     Packs/day: 1.00     Years: 15.00     Pack years: 15.00     Types: Cigarettes     Quit date:      Years since quittin.6   • Smokeless tobacco: Never Used   Substance Use Topics   • Alcohol use: Yes     Alcohol/week: 4.2 oz     Types: 7 Glasses of wine per week   • Drug use: No        Past Medical History:   Diagnosis Date   • Disorder of thyroid    • GERD (gastroesophageal reflux disease)    • Gynecological disorder     heavy periods   • Hypertension    • Indigestion        Past Surgical History:   Procedure Laterality Date   • HYSTERECTOMY LAPAROSCOPY Bilateral 2016    Procedure: HYSTERECTOMY LAPAROSCOPY bilateral salpingectomy;  Surgeon: Nba Benitez M.D.;  Location: SURGERY SAME DAY [facility];  Service:    • VAGINAL SUSPENSION N/A 2016    Procedure: UTEROSACRAL VAULT SUSPENSION;  Surgeon: Nba Benitez M.D.;  Location: SURGERY SAME DAY [facility];  Service:    • CYSTOSCOPY N/A 2016    Procedure: CYSTOSCOPY;  Surgeon: Nba Benitez M.D.;  Location: SURGERY SAME DAY [facility];  Service:    • GYN SURGERY     • OTHER ABDOMINAL SURGERY      gallbladder, appendix   • TUBAL LIGATION         Allergies: Nkda [no known drug allergy]    Family History   Problem Relation Age of Onset   • Lung Disease Mother         copd   •  "Hypertension Mother    • Alcohol/Drug Mother    • Lung Cancer Father    • Lung Disease Father         cancer   • Alcohol/Drug Father    • Heart Disease Maternal Grandmother         Fatal MI at 48, sisters had CVAs       Vitals:    08/07/20 1026   Height: 1.588 m (5' 2.5\")   Weight: 75.9 kg (167 lb 4 oz)   Weight % change since last entry.: 0 %   BP: 122/76   Pulse: 85   BMI (Calculated): 30.1       Physical Examination  Physical Exam   Constitutional: She is oriented to person, place, and time. No distress.   HENT:   Head: Normocephalic and atraumatic.   Right Ear: External ear normal.   Left Ear: External ear normal.   Mouth/Throat: Oropharynx is clear and moist.   Eyes: Pupils are equal, round, and reactive to light. Conjunctivae and EOM are normal.   Neck: Normal range of motion. Neck supple. No JVD present. No tracheal deviation present. No thyromegaly present.   Cardiovascular: Normal rate, regular rhythm and intact distal pulses. Exam reveals no gallop and no friction rub.   No murmur heard.  Pulmonary/Chest: No stridor. No respiratory distress. She has no wheezes. She has no rales. She exhibits no tenderness.   Abdominal: Soft. Bowel sounds are normal.   Musculoskeletal:         General: No tenderness, deformity or edema.   Lymphadenopathy:     She has no cervical adenopathy.   Neurological: She is alert and oriented to person, place, and time. No cranial nerve deficit. Gait normal. Coordination normal. GCS score is 15.   Skin: Skin is warm and dry. No rash noted. She is not diaphoretic.   Psychiatric: Mood, affect and judgment normal.          Pardeep Monge M.D., MD M[facility]  [facility]  "

## 2020-08-09 PROBLEM — R05.9 COUGH: Status: ACTIVE | Noted: 2020-08-09

## 2020-08-09 ASSESSMENT — ENCOUNTER SYMPTOMS
SHORTNESS OF BREATH: 1
PSYCHIATRIC NEGATIVE: 1
CARDIOVASCULAR NEGATIVE: 1
MUSCULOSKELETAL NEGATIVE: 1
COUGH: 1
NEUROLOGICAL NEGATIVE: 1
EYES NEGATIVE: 1
GASTROINTESTINAL NEGATIVE: 1
CONSTITUTIONAL NEGATIVE: 1

## 2020-08-09 NOTE — ASSESSMENT & PLAN NOTE
With evidence of obstruction and BD response on pFTs 6/30/20  Main concern is that her GERD behavioural modification is not being followed and this is what is resulting in asthma based on hx  Reviewed and given handouts on GERD behavioral modification  As flovent is not working trial asmanex and repeat spirometry  Pt will arrange follow up in a month

## 2020-09-02 ENCOUNTER — APPOINTMENT (OUTPATIENT)
Dept: RADIOLOGY | Facility: MEDICAL CENTER | Age: 50
End: 2020-09-02
Attending: NURSE PRACTITIONER
Payer: COMMERCIAL

## 2020-09-02 DIAGNOSIS — Z12.31 VISIT FOR SCREENING MAMMOGRAM: ICD-10-CM

## 2020-09-09 ENCOUNTER — PATIENT MESSAGE (OUTPATIENT)
Dept: PULMONOLOGY | Facility: HOSPICE | Age: 50
End: 2020-09-09

## 2020-09-18 ENCOUNTER — HOSPITAL ENCOUNTER (OUTPATIENT)
Dept: RADIOLOGY | Facility: MEDICAL CENTER | Age: 50
End: 2020-09-18
Attending: NURSE PRACTITIONER
Payer: COMMERCIAL

## 2020-09-18 DIAGNOSIS — R92.30 DENSE BREAST TISSUE: ICD-10-CM

## 2020-09-18 DIAGNOSIS — Z12.31 VISIT FOR SCREENING MAMMOGRAM: ICD-10-CM

## 2020-09-18 PROCEDURE — 76641 ULTRASOUND BREAST COMPLETE: CPT

## 2020-09-18 PROCEDURE — 77067 SCR MAMMO BI INCL CAD: CPT

## 2020-09-21 ENCOUNTER — HOSPITAL ENCOUNTER (OUTPATIENT)
Dept: RADIOLOGY | Facility: MEDICAL CENTER | Age: 50
End: 2020-09-21
Attending: INTERNAL MEDICINE
Payer: COMMERCIAL

## 2020-09-21 DIAGNOSIS — R05.9 COUGH: ICD-10-CM

## 2020-09-21 PROCEDURE — 71046 X-RAY EXAM CHEST 2 VIEWS: CPT

## 2020-09-22 ENCOUNTER — TELEPHONE (OUTPATIENT)
Dept: PULMONOLOGY | Facility: HOSPICE | Age: 50
End: 2020-09-22

## 2020-09-22 DIAGNOSIS — R05.9 COUGH: ICD-10-CM

## 2020-09-23 ENCOUNTER — HOSPITAL ENCOUNTER (OUTPATIENT)
Facility: MEDICAL CENTER | Age: 50
End: 2020-09-23
Attending: NURSE PRACTITIONER
Payer: COMMERCIAL

## 2020-09-23 ENCOUNTER — EH NON-PROVIDER (OUTPATIENT)
Dept: OCCUPATIONAL MEDICINE | Facility: CLINIC | Age: 50
End: 2020-09-23

## 2020-09-23 DIAGNOSIS — Z11.59 SCREENING FOR VIRAL DISEASE: ICD-10-CM

## 2020-09-23 LAB
COVID ORDER STATUS COVID19: NORMAL
SARS-COV-2 RNA RESP QL NAA+PROBE: NOTDETECTED
SPECIMEN SOURCE: NORMAL

## 2020-09-23 PROCEDURE — U0003 INFECTIOUS AGENT DETECTION BY NUCLEIC ACID (DNA OR RNA); SEVERE ACUTE RESPIRATORY SYNDROME CORONAVIRUS 2 (SARS-COV-2) (CORONAVIRUS DISEASE [COVID-19]), AMPLIFIED PROBE TECHNIQUE, MAKING USE OF HIGH THROUGHPUT TECHNOLOGIES AS DESCRIBED BY CMS-2020-01-R: HCPCS | Mod: CS | Performed by: NURSE PRACTITIONER

## 2020-09-23 NOTE — TELEPHONE ENCOUNTER
Dr Maynard signed order.    Called and spoke with pt earlier, notifying her of this. She said is going to  at our front door(screening door) so she can go to [facility] to get done.     Placed order with the door screener.

## 2020-09-24 ENCOUNTER — TELEPHONE (OUTPATIENT)
Dept: OCCUPATIONAL MEDICINE | Facility: CLINIC | Age: 50
End: 2020-09-24

## 2020-09-24 NOTE — TELEPHONE ENCOUNTER
Phone Number Called: [phone] (home) [phone] (work)      Call outcome: Spoke to patient regarding message below.    Message: Covid results are negative and patient was told to follow up with IP for further clearance.

## 2020-09-30 ENCOUNTER — IMMUNIZATION (OUTPATIENT)
Dept: OCCUPATIONAL MEDICINE | Facility: CLINIC | Age: 50
End: 2020-09-30

## 2020-09-30 DIAGNOSIS — Z23 NEED FOR VACCINATION: ICD-10-CM

## 2020-09-30 PROCEDURE — 90686 IIV4 VACC NO PRSV 0.5 ML IM: CPT | Performed by: PREVENTIVE MEDICINE

## 2020-10-22 ENCOUNTER — PATIENT MESSAGE (OUTPATIENT)
Dept: PULMONOLOGY | Facility: HOSPICE | Age: 50
End: 2020-10-22

## 2020-10-23 ENCOUNTER — PATIENT MESSAGE (OUTPATIENT)
Dept: PULMONOLOGY | Facility: HOSPICE | Age: 50
End: 2020-10-23

## 2020-10-23 NOTE — TELEPHONE ENCOUNTER
From: Nancie Ugalde  To: Fe Hicks, Med Ass't  Sent: 10/23/2020 12:13 PM PDT  Subject: Prescription Question    Yes that is correct. Thank you      ----- Message -----   From:Fe Hicks, Med Ass't   Sent:10/23/2020 12:09 PM PDT   To:Nancie Ugalde   Subject:RE: Prescription Question    Gerber Canada,  I just want to make sure before I pend an order for the physician. You would like a Nebulizer machine and Albuterol medication?  Thanks,  Fe      ----- Message -----   From:Nancie Ugalde   Sent:10/22/2020 1:45 PM PDT   To:Pardeep Monge M.D.   Subject:Prescription Question    Can I please get a prescription for a nebulizer with albuterol for SOB PRN? Also an MDI   Thank you!

## 2020-10-27 ENCOUNTER — PATIENT MESSAGE (OUTPATIENT)
Dept: PULMONOLOGY | Facility: HOSPICE | Age: 50
End: 2020-10-27

## 2020-10-27 DIAGNOSIS — R05.9 COUGH: ICD-10-CM

## 2020-10-27 RX ORDER — ALBUTEROL SULFATE 2.5 MG/3ML
2.5 SOLUTION RESPIRATORY (INHALATION) EVERY 4 HOURS PRN
Qty: 120 ML | Refills: 11 | Status: SHIPPED | OUTPATIENT
Start: 2020-10-27

## 2020-10-27 NOTE — PATIENT COMMUNICATION
Faxed order, demo, insurance card, and Last OV to DME:  [facility] /  [phone] / fax [phone]

## 2020-10-27 NOTE — TELEPHONE ENCOUNTER
From: Nancie Ugalde  To: Fe Hicks, Med Ass't  Sent: 10/27/2020 11:03 AM PDT  Subject: Prescription Question    Gerber Miramontes,  I can purchase the machine. The albuterol RX will be great.  Thank you!      ----- Message -----   From:Fe Hicks, Med Ass't   Sent:10/27/2020 10:32 AM PDT   To:Nancie Ugalde   Subject:RE: Prescription Question    Good Morning Nancie,  I spoke with our [facility] . She said you would need to be seen for the Nebulizer machine. With your insurance they need documentation for the Nebulizer machine to be paid for but not the medication. She also said you can purchase the machine as well. Let me know what you would like to me to do or if this sounds confusing.  ThanksFe      ----- Message -----   From:Nancie Ugalde   Sent:10/23/2020 12:13 PM PDT   To:Fe Hicks, Med Ass't   Subject:Prescription Question    Yes that is correct. Thank you      ----- Message -----   From:Fe Hicks, Med Ass't   Sent:10/23/2020 12:09 PM PDT   To:Nancie Ugalde   Subject:RE: Prescription Question    Gerber Canada,  I just want to make sure before I pend an order for the physician. You would like a Nebulizer machine and Albuterol medication?  Fe Mckeon      ----- Message -----   From:Nancie Ugalde   Sent:10/22/2020 1:45 PM PDT   To:Pardeep Monge M.D.   Subject:Prescription Question    Can I please get a prescription for a nebulizer with albuterol for SOB PRN? Also an MDI   Thank you!

## 2020-11-04 ENCOUNTER — ANTICOAGULATION MONITORING (OUTPATIENT)
Dept: VASCULAR LAB | Facility: MEDICAL CENTER | Age: 50
End: 2020-11-04

## 2020-11-04 DIAGNOSIS — I82.439 ACUTE DEEP VEIN THROMBOSIS (DVT) OF POPLITEAL VEIN, UNSPECIFIED LATERALITY (HCC): ICD-10-CM

## 2020-11-05 NOTE — PROGRESS NOTES
Discharged from [facility].  Anticoagulation discontinued by PCP.  Will defer all medical management to PCP.  Catie Miranda, Clinical Pharmacist, CDE, CACP

## 2020-12-16 DIAGNOSIS — Z23 NEED FOR VACCINATION: ICD-10-CM

## 2020-12-17 ENCOUNTER — APPOINTMENT (OUTPATIENT)
Dept: FAMILY PLANNING/WOMEN'S HEALTH CLINIC | Facility: IMMUNIZATION CENTER | Age: 50
End: 2020-12-17
Attending: FAMILY MEDICINE
Payer: COMMERCIAL

## 2020-12-17 DIAGNOSIS — Z23 NEED FOR VACCINATION: ICD-10-CM

## 2020-12-17 PROCEDURE — 0001A PFIZER SARS-COV-2 VACCINE: CPT

## 2020-12-17 PROCEDURE — 91300 PFIZER SARS-COV-2 VACCINE: CPT

## 2020-12-18 ENCOUNTER — IMMUNIZATION (OUTPATIENT)
Dept: FAMILY PLANNING/WOMEN'S HEALTH CLINIC | Facility: IMMUNIZATION CENTER | Age: 50
End: 2020-12-18

## 2020-12-18 DIAGNOSIS — Z23 ENCOUNTER FOR VACCINATION: Primary | ICD-10-CM

## 2020-12-28 ENCOUNTER — HOSPITAL ENCOUNTER (OUTPATIENT)
Dept: LAB | Facility: MEDICAL CENTER | Age: 50
End: 2020-12-28
Attending: EMERGENCY MEDICINE
Payer: COMMERCIAL

## 2021-01-08 ENCOUNTER — IMMUNIZATION (OUTPATIENT)
Dept: FAMILY PLANNING/WOMEN'S HEALTH CLINIC | Facility: IMMUNIZATION CENTER | Age: 51
End: 2021-01-08
Attending: FAMILY MEDICINE
Payer: COMMERCIAL

## 2021-01-08 DIAGNOSIS — Z23 ENCOUNTER FOR VACCINATION: Primary | ICD-10-CM

## 2021-01-08 PROCEDURE — 0002A PFIZER SARS-COV-2 VACCINE: CPT

## 2021-01-08 PROCEDURE — 91300 PFIZER SARS-COV-2 VACCINE: CPT

## 2021-08-02 DIAGNOSIS — I10 ESSENTIAL HYPERTENSION: ICD-10-CM

## 2021-08-02 RX ORDER — LISINOPRIL AND HYDROCHLOROTHIAZIDE 20; 12.5 MG/1; MG/1
2 TABLET ORAL DAILY
Qty: 60 TABLET | Refills: 1 | Status: SHIPPED | OUTPATIENT
Start: 2021-08-02 | End: 2021-12-04 | Stop reason: SDUPTHER

## 2021-08-02 RX ORDER — LISINOPRIL AND HYDROCHLOROTHIAZIDE 20; 12.5 MG/1; MG/1
TABLET ORAL
Qty: 60 TABLET | Refills: 2 | Status: SHIPPED | OUTPATIENT
Start: 2021-08-02 | End: 2022-07-13 | Stop reason: SDUPTHER

## 2021-08-20 ENCOUNTER — OFFICE VISIT (OUTPATIENT)
Dept: MEDICAL GROUP | Facility: MEDICAL CENTER | Age: 51
End: 2021-08-20
Payer: OTHER MISCELLANEOUS

## 2021-08-20 VITALS
OXYGEN SATURATION: 96 % | DIASTOLIC BLOOD PRESSURE: 90 MMHG | WEIGHT: 169 LBS | SYSTOLIC BLOOD PRESSURE: 128 MMHG | HEART RATE: 87 BPM | BODY MASS INDEX: 31.1 KG/M2 | RESPIRATION RATE: 16 BRPM | HEIGHT: 62 IN | TEMPERATURE: 96.5 F

## 2021-08-20 DIAGNOSIS — Z13.21 ENCOUNTER FOR VITAMIN DEFICIENCY SCREENING: ICD-10-CM

## 2021-08-20 DIAGNOSIS — I10 ESSENTIAL HYPERTENSION: ICD-10-CM

## 2021-08-20 DIAGNOSIS — J35.1 TONSILLAR HYPERTROPHY: ICD-10-CM

## 2021-08-20 DIAGNOSIS — Z12.31 ENCOUNTER FOR SCREENING MAMMOGRAM FOR BREAST CANCER: ICD-10-CM

## 2021-08-20 DIAGNOSIS — K22.70 BARRETT'S ESOPHAGUS DETERMINED BY ENDOSCOPY: ICD-10-CM

## 2021-08-20 DIAGNOSIS — E06.3 HYPOTHYROIDISM DUE TO HASHIMOTO'S THYROIDITIS: ICD-10-CM

## 2021-08-20 DIAGNOSIS — Z13.220 LIPID SCREENING: ICD-10-CM

## 2021-08-20 DIAGNOSIS — Z12.11 COLON CANCER SCREENING: ICD-10-CM

## 2021-08-20 DIAGNOSIS — E03.8 HYPOTHYROIDISM DUE TO HASHIMOTO'S THYROIDITIS: ICD-10-CM

## 2021-08-20 DIAGNOSIS — Z00.00 ANNUAL PHYSICAL EXAM: ICD-10-CM

## 2021-08-20 PROBLEM — Z86.718 HISTORY OF DVT (DEEP VEIN THROMBOSIS): Status: ACTIVE | Noted: 2019-11-14

## 2021-08-20 PROCEDURE — 99396 PREV VISIT EST AGE 40-64: CPT | Performed by: NURSE PRACTITIONER

## 2021-08-20 ASSESSMENT — PATIENT HEALTH QUESTIONNAIRE - PHQ9: CLINICAL INTERPRETATION OF PHQ2 SCORE: 0

## 2021-08-20 NOTE — ASSESSMENT & PLAN NOTE
Chronic issue managed with [place] Thyroid 90 mg daily.  Energy has been stable.  No complaints of excessive fatigue, constipation

## 2021-08-20 NOTE — PROGRESS NOTES
Subjective:     Chief Complaint   Patient presents with   • Annual Exam     Nancie Ugalde is a 50 y.o. female established patient here for annual.  She has had a hysterectomy and does not require Pap smear.  Due for colonoscopy as well as mammogram in Sept    Hypothyroidism due to Hashimoto's thyroiditis  Chronic issue managed with [place] Thyroid 90 mg daily.  Energy has been stable.  No complaints of excessive fatigue, constipation    Mcneil's esophagus determined by endoscopy  She continues with pantoprazole.  No complaints of dysphagia, chronic sore throat, chronic cough    Essential hypertension  Chronic issue managed with lisinopril hydrochlorothiazide 20-12.5 mg daily.  Initial blood pressure reading in office today taken by the medical assistant is 150/90.  Blood pressure rechecked at the end of visit by myself 128/90.  She does occasionally monitor at home although has not checked recently.  No chest pain, dizziness    Tonsillar hypertrophy  This is been a chronic issue, contributing to snoring and sleep apnea.  She gets frequent sore throats but has not required any recent antibiotic treatment.  She is interested in seeing Dr. Verma for evaluation       Current medicines (including changes today)  Current Outpatient Medications   Medication Sig Dispense Refill   • lisinopril-hydrochlorothiazide (PRINZIDE) 20-12.5 MG per tablet TAKE TWO TABLETS BY MOUTH EVERY DAY 60 tablet 2   • lisinopril-hydrochlorothiazide (PRINZIDE) 20-12.5 MG per tablet Take 2 Tablets by mouth every day. 60 tablet 1   • pantoprazole (PROTONIX) 40 MG Tablet Delayed Response TAKE ONE TABLET BY MOUTH EVERY DAY 90 Tab 1   • ARMOUR THYROID 90 MG Tab TAKE ONE TABLET BY MOUTH EVERY DAY 90 Tab 1   • albuterol (PROVENTIL) 2.5mg/3ml Nebu Soln solution for nebulization 3 mL by Nebulization route every four hours as needed for Shortness of Breath. 120 mL 11   • mometasone (ASMANEX, 60 METERED DOSES,) 220 MCG/INH inhaler Inhale 1 Puff  "by mouth every day. Rinse mouth after each use. 1 Each 5   • ranitidine (ZANTAC) 300 MG tablet Take 300 mg by mouth every day.       No current facility-administered medications for this visit.     She  has a past medical history of Disorder of thyroid, GERD (gastroesophageal reflux disease), Gynecological disorder, Hypertension, and Indigestion.    ROS included above     Objective:     /90   Pulse 87   Temp 35.8 °C (96.5 °F) (Temporal)   Resp 16   Ht 1.575 m (5' 2\")   Wt 76.7 kg (169 lb)   SpO2 96%  Body mass index is 30.91 kg/m².     Physical Exam:  General: Alert, oriented in no acute distress.  Eye contact is good, speech is normal, affect calm  HEENT: Tonsils 3+, no lesions or exudate.  TMs gray with good landmarks bilaterally. No tonsillar or cervical lymphadenopathy.  Lungs: clear to auscultation bilaterally, normal effort, no wheeze/ rhonchi/ rales.  CV: regular rate and rhythm, S1, S2, no murmur  Abdomen: soft, nontender  Ext: no edema, color normal, vascularity normal, temperature normal    Assessment and Plan:   The following treatment plan was discussed   1. Annual physical exam  Normal physical exam. General health and wellness discussion including healthy diet, regular exercise.she has had a hysterectomy and does not require Pap smear.  Advised regular dental cleanings, eye exam yearly.    TSH WITH REFLEX TO FT4    Lipid Profile    Comp Metabolic Panel    VITAMIN D,25 HYDROXY   2. Hypothyroidism due to Hashimoto's thyroiditis   clinically euthyroid  TSH WITH REFLEX TO FT4   3. Encounter for vitamin deficiency screening  VITAMIN D,25 HYDROXY   4. Tonsillar hypertrophy   chronic issue, difficult exam today.  She does seem to have some restriction, this is contributing to her sleep apnea.  We will refer her for consultation  REFERRAL TO ENT   5. Mcneil's esophagus determined by endoscopy   continue pantoprazole   6. Essential hypertension   encouraged home monitoring.  Diastolic reading is a " bit elevated in clinic today.  She may report back to me with readings.   7. Lipid screening  Lipid Profile       Followup: Pending labs         Please note that this dictation was created using voice recognition software. I have worked with consultants from the vendor as well as technical experts from [facility] to optimize the interface. I have made every reasonable attempt to correct obvious errors, but I expect that there are errors of grammar and possibly content that I did not discover before finalizing the note.

## 2021-08-20 NOTE — ASSESSMENT & PLAN NOTE
Chronic issue managed with lisinopril hydrochlorothiazide 20-12.5 mg daily.  Initial blood pressure reading in office today taken by the medical assistant is 150/90.  Blood pressure rechecked at the end of visit by myself 128/90.  She does occasionally monitor at home although has not checked recently.  No chest pain, dizziness

## 2021-08-20 NOTE — ASSESSMENT & PLAN NOTE
This is been a chronic issue, contributing to snoring and sleep apnea.  She gets frequent sore throats but has not required any recent antibiotic treatment.  She is interested in seeing Dr. Verma for evaluation

## 2021-09-02 DIAGNOSIS — K22.70 BARRETT'S ESOPHAGUS DETERMINED BY ENDOSCOPY: ICD-10-CM

## 2021-09-02 DIAGNOSIS — E06.3 HYPOTHYROIDISM DUE TO HASHIMOTO'S THYROIDITIS: ICD-10-CM

## 2021-09-02 DIAGNOSIS — E03.8 HYPOTHYROIDISM DUE TO HASHIMOTO'S THYROIDITIS: ICD-10-CM

## 2021-09-02 DIAGNOSIS — K21.00 GASTROESOPHAGEAL REFLUX DISEASE WITH ESOPHAGITIS: ICD-10-CM

## 2021-09-03 RX ORDER — PANTOPRAZOLE SODIUM 40 MG/1
40 TABLET, DELAYED RELEASE ORAL
Qty: 90 TABLET | Refills: 1 | Status: SHIPPED | OUTPATIENT
Start: 2021-09-03 | End: 2022-03-16 | Stop reason: SDUPTHER

## 2021-09-03 RX ORDER — THYROID,PORK 90 MG
90 TABLET ORAL
Qty: 90 TABLET | Refills: 1 | Status: SHIPPED | OUTPATIENT
Start: 2021-09-03 | End: 2022-03-16 | Stop reason: SDUPTHER

## 2022-01-04 ENCOUNTER — APPOINTMENT (OUTPATIENT)
Dept: RADIOLOGY | Facility: MEDICAL CENTER | Age: 52
End: 2022-01-04
Attending: NURSE PRACTITIONER
Payer: OTHER MISCELLANEOUS

## 2022-03-16 DIAGNOSIS — K22.70 BARRETT'S ESOPHAGUS DETERMINED BY ENDOSCOPY: ICD-10-CM

## 2022-03-16 DIAGNOSIS — K21.00 GASTROESOPHAGEAL REFLUX DISEASE WITH ESOPHAGITIS: ICD-10-CM

## 2022-03-16 DIAGNOSIS — E06.3 HYPOTHYROIDISM DUE TO HASHIMOTO'S THYROIDITIS: ICD-10-CM

## 2022-03-16 DIAGNOSIS — E03.8 HYPOTHYROIDISM DUE TO HASHIMOTO'S THYROIDITIS: ICD-10-CM

## 2022-03-18 RX ORDER — THYROID,PORK 90 MG
90 TABLET ORAL
Qty: 90 TABLET | Refills: 1 | Status: SHIPPED | OUTPATIENT
Start: 2022-03-18 | End: 2022-10-19

## 2022-03-18 RX ORDER — PANTOPRAZOLE SODIUM 40 MG/1
40 TABLET, DELAYED RELEASE ORAL
Qty: 90 TABLET | Refills: 1 | Status: SHIPPED | OUTPATIENT
Start: 2022-03-18 | End: 2022-09-20 | Stop reason: SDUPTHER

## 2022-07-13 ENCOUNTER — APPOINTMENT (OUTPATIENT)
Dept: MEDICAL GROUP | Facility: MEDICAL CENTER | Age: 52
End: 2022-07-13
Payer: OTHER MISCELLANEOUS

## 2022-07-13 ENCOUNTER — PATIENT MESSAGE (OUTPATIENT)
Dept: MEDICAL GROUP | Facility: MEDICAL CENTER | Age: 52
End: 2022-07-13

## 2022-07-13 DIAGNOSIS — I10 ESSENTIAL HYPERTENSION: ICD-10-CM

## 2022-07-13 RX ORDER — LISINOPRIL AND HYDROCHLOROTHIAZIDE 20; 12.5 MG/1; MG/1
2 TABLET ORAL
Qty: 60 TABLET | Refills: 0 | Status: SHIPPED | OUTPATIENT
Start: 2022-07-13 | End: 2022-07-26 | Stop reason: SDUPTHER

## 2022-07-26 ENCOUNTER — OFFICE VISIT (OUTPATIENT)
Dept: MEDICAL GROUP | Facility: MEDICAL CENTER | Age: 52
End: 2022-07-26
Payer: OTHER MISCELLANEOUS

## 2022-07-26 VITALS
SYSTOLIC BLOOD PRESSURE: 144 MMHG | TEMPERATURE: 96.7 F | DIASTOLIC BLOOD PRESSURE: 92 MMHG | WEIGHT: 168.65 LBS | OXYGEN SATURATION: 97 % | BODY MASS INDEX: 28.79 KG/M2 | HEART RATE: 76 BPM | HEIGHT: 64 IN

## 2022-07-26 DIAGNOSIS — Z00.00 HEALTHCARE MAINTENANCE: ICD-10-CM

## 2022-07-26 DIAGNOSIS — Z12.31 ENCOUNTER FOR SCREENING MAMMOGRAM FOR BREAST CANCER: ICD-10-CM

## 2022-07-26 DIAGNOSIS — Z12.11 COLON CANCER SCREENING: ICD-10-CM

## 2022-07-26 DIAGNOSIS — I10 ESSENTIAL HYPERTENSION: ICD-10-CM

## 2022-07-26 DIAGNOSIS — E06.3 HYPOTHYROIDISM DUE TO HASHIMOTO'S THYROIDITIS: ICD-10-CM

## 2022-07-26 DIAGNOSIS — E03.8 HYPOTHYROIDISM DUE TO HASHIMOTO'S THYROIDITIS: ICD-10-CM

## 2022-07-26 DIAGNOSIS — H57.89 EYE REDNESS: ICD-10-CM

## 2022-07-26 DIAGNOSIS — J45.20 MILD INTERMITTENT ASTHMA WITHOUT COMPLICATION: ICD-10-CM

## 2022-07-26 PROBLEM — L71.0 PERIORAL DERMATITIS: Status: RESOLVED | Noted: 2018-10-30 | Resolved: 2022-07-26

## 2022-07-26 PROBLEM — L72.9 SUBCUTANEOUS CYST: Status: RESOLVED | Noted: 2019-06-20 | Resolved: 2022-07-26

## 2022-07-26 PROBLEM — G47.9 SLEEPING DIFFICULTY: Status: RESOLVED | Noted: 2017-11-15 | Resolved: 2022-07-26

## 2022-07-26 PROBLEM — E66.9 OBESITY (BMI 30-39.9): Status: RESOLVED | Noted: 2018-06-15 | Resolved: 2022-07-26

## 2022-07-26 PROBLEM — R53.82 CHRONIC FATIGUE: Status: RESOLVED | Noted: 2017-11-15 | Resolved: 2022-07-26

## 2022-07-26 PROBLEM — J06.9 VIRAL URI WITH COUGH: Status: RESOLVED | Noted: 2019-07-19 | Resolved: 2022-07-26

## 2022-07-26 PROCEDURE — 99214 OFFICE O/P EST MOD 30 MIN: CPT | Performed by: INTERNAL MEDICINE

## 2022-07-26 RX ORDER — LISINOPRIL AND HYDROCHLOROTHIAZIDE 20; 12.5 MG/1; MG/1
2 TABLET ORAL
Qty: 180 TABLET | Refills: 1 | Status: SHIPPED | OUTPATIENT
Start: 2022-07-26 | End: 2023-03-29 | Stop reason: SDUPTHER

## 2022-07-26 ASSESSMENT — ENCOUNTER SYMPTOMS
EYE REDNESS: 1
DEPRESSION: 0
CHILLS: 0
EYE DISCHARGE: 0
FEVER: 0
SHORTNESS OF BREATH: 0
NAUSEA: 0
SORE THROAT: 0
VOMITING: 0
ABDOMINAL PAIN: 0
EYE PAIN: 0

## 2022-07-26 ASSESSMENT — PATIENT HEALTH QUESTIONNAIRE - PHQ9: CLINICAL INTERPRETATION OF PHQ2 SCORE: 0

## 2022-07-26 NOTE — NON-PROVIDER
HPI  Nancie Ugalde is a 51 y.o. female with a history of hypothyroidism, hypertension who presents to the clinic to establish care.    Eye redness  Nancie first notice eye redness and swelling three days ago in the morning The previous night she applied a new eye cream and went to bed. The next morning her eyes were red and swollen. She denies any itching, or painful sensation. She did not experience any discharge from the eye, rhinorrhea, or sore throat. She removed the cream and washed her eyes. While the swelling improved the redness in her eyes remained the same. She did not take any medications for her symptoms.      REVIEW OF SYSTEMS  Review of Systems   Constitutional: Negative for chills and fever.   HENT: Negative for congestion and sore throat.    Eyes: Positive for redness. Negative for pain and discharge.   All other systems reviewed and are negative.      PAST MEDICAL HISTORY   has a past medical history of Disorder of thyroid, GERD (gastroesophageal reflux disease), Gynecological disorder, Hypertension, and Indigestion.    SURGICAL HISTORY   has a past surgical history that includes other abdominal surgery; gyn surgery; tubal ligation; hysterectomy laparoscopy (Bilateral, 2016); vaginal suspension (N/A, 2016); and cystoscopy (N/A, 2016).    FAMILY HISTORY  Family History   Problem Relation Age of Onset   • Cancer Mother         thyroid Ca   • Lung Disease Mother         copd   • Hypertension Mother    • Alcohol/Drug Mother    • Lung Cancer Father    • Lung Disease Father         cancer   • Alcohol/Drug Father    • Heart Disease Maternal Grandmother         Fatal MI at 48, sisters had CVAs   • Diabetes Neg Hx         SOCIAL HISTORY  Social History     Tobacco Use   • Smoking status: Former Smoker     Packs/day: 1.00     Years: 15.00     Pack years: 15.00     Types: Cigarettes     Quit date:      Years since quittin.5   • Smokeless tobacco: Never Used   Vaping Use   •  "Vaping Use: Never used   Substance and Sexual Activity   • Alcohol use: Yes     Alcohol/week: 4.2 oz     Types: 7 Glasses of wine per week     Comment: social   • Drug use: No   • Sexual activity: Yes     Partners: Male     Birth control/protection: Surgical       CURRENT MEDICATIONS  Current Outpatient Medications   Medication Sig Dispense Refill   • lisinopril-hydrochlorothiazide (PRINZIDE) 20-12.5 MG per tablet Take 2 Tablets by mouth every day. 180 Tablet 1   • mometasone (ASMANEX, 60 METERED DOSES,) 220 MCG/INH inhaler Inhale 1 Puff every day. Rinse mouth after each use. 1 Each 5   • pantoprazole (PROTONIX) 40 MG Tablet Delayed Response Take 1 Tablet by mouth every day. 90 Tablet 1   • ARMOUR THYROID 90 MG Tab Take 1 Tablet by mouth every day. 90 Tablet 1   • albuterol (PROVENTIL) 2.5mg/3ml Nebu Soln solution for nebulization 3 mL by Nebulization route every four hours as needed for Shortness of Breath. 120 mL 11     No current facility-administered medications for this visit.       ALLERGIES  Allergies   Allergen Reactions   • Nkda [No Known Drug Allergy]        PHYSICAL EXAM  VITAL SIGNS: BP (!) 144/92 (BP Location: Left arm, Patient Position: Sitting, BP Cuff Size: Adult)   Pulse 76   Temp 35.9 °C (96.7 °F) (Temporal)   Ht 1.626 m (5' 4\")   Wt 76.5 kg (168 lb 10.4 oz)   LMP 08/16/2016   SpO2 97%   BMI 28.95 kg/m²   Physical Exam  Vitals reviewed.   Constitutional:       General: She is not in acute distress.     Appearance: Normal appearance.   HENT:      Head: Normocephalic and atraumatic.      Nose: No congestion or rhinorrhea.      Mouth/Throat:      Mouth: Mucous membranes are moist.      Pharynx: Oropharynx is clear. No posterior oropharyngeal erythema.   Eyes:      General:         Right eye: No discharge.         Left eye: No discharge.      Extraocular Movements: Extraocular movements intact.      Pupils: Pupils are equal, round, and reactive to light.      Comments: Bilateral conjunctival " irritation- R>L  Slight superior orbital swelling bilaterally.   Cardiovascular:      Rate and Rhythm: Normal rate and regular rhythm.      Heart sounds: Normal heart sounds.   Pulmonary:      Effort: Pulmonary effort is normal.      Breath sounds: Normal breath sounds. No wheezing.   Skin:     General: Skin is warm and dry.      Capillary Refill: Capillary refill takes less than 2 seconds.   Neurological:      Mental Status: She is alert.   Psychiatric:         Behavior: Behavior normal.       Assessment & Plan    Nancie Ugalde is a 51 year old female with a past medical history of hypothyroidism, hypertension who presents to the clinic with allergic conjunctivitis to establish care.    Allergic conjunctivitis  The patient experience eye swelling and conjunctival changes following the application of a new eye cream. She has not reused the eye cream since the first application.  The patient was instructed to continue avoiding use of the new eye cream and to take OTC benadryl at night or zyrtec/claritin/allegra (which ever she preferred) during the day for her persistent symptoms. She was educated that this may take two weeks to resolve, but to contact the office through Mychart if symptoms persist or worsen. The patient understood and was agreeable to the plan.    Hypertension  The patients blood pressure was 144/92 (140/94 on re-measurement) today in the office. She states that her blood pressure is normally 117/70s-80s at home and that her pressure normally elevates when she comes into the office. The patient was instructed to continue monitoring her blood pressure everyday, eat a low sodium diet, and continue to take her lisinopril/HCTZ as direct. Also lab studies including thyroid panel, CBC, CMP, lipid panel were ordered for further evaluation. The patients blood pressure and lab results will be rechecked at the next appointment.     Hypothyroidism  The patient has a history of hypothyroidism that she has  been taking armour thyroid 90mg qd. The patients lat thyroid panel was in 2020.   A thyroid panel was ordered for further evaluation. She was instructed to continue to take her current armour thyroid as directed.    Colon Screening  The patient has never had a colonoscopy. She was given the option of cologuard stool study and a colonoscopy. The patient [facility].    Mammogram  The patient did not remember when her last mammogram was, but knows that she is overdue for a screening. The patient was given a referral for mammorgram.

## 2022-07-26 NOTE — ASSESSMENT & PLAN NOTE
The patients blood pressure was 144/92 (140/94 on re-measurement) today in the office. She states that her blood pressure is normally 117/70s-80s at home and that her pressure normally elevates when she comes into the office. The patient was instructed to continue monitoring her blood pressure everyday, eat a low sodium diet, and continue to take her lisinopril/HCTZ as direct. Also lab studies including thyroid panel, CBC, CMP, lipid panel were ordered for further evaluation. The patients blood pressure and lab results will be rechecked at the next appointment.

## 2022-07-26 NOTE — PROGRESS NOTES
Subjective:      Diagnoses of Colon cancer screening, Encounter for screening mammogram for breast cancer, Essential hypertension, Mild intermittent asthma without complication, Eye redness, Hypothyroidism due to Hashimoto's thyroiditis, and Healthcare maintenance were pertinent to this visit.    HISTORY OF THE PRESENT ILLNESS: Patient is a 51 y.o. female. This pleasant patient is here today to establish care. Her prior PCP was Esther Frederick.    Problem   Eye Redness    Nancie first notice eye redness and swelling three days ago in the morning The previous night she applied a new eye cream and went to bed. The next morning her eyes were red and swollen. She denies any itching, or painful sensation. She did not experience any discharge from the eye, rhinorrhea, or sore throat. She removed the cream and washed her eyes. While the swelling improved the redness in her eyes remained the same. She did not take any medications for her symptoms.     Healthcare Maintenance    Mammogram: Overdue, order provided.  Colorectal cancer screening: Overdue, ordered Cologuard.     Hypothyroidism Due to Hashimoto's Thyroiditis    The patient has a history of hypothyroidism that she has been taking armour thyroid 90mg qd. The patients lat thyroid panel was in 2020.   A thyroid panel was ordered for further evaluation. She was instructed to continue to take her current armour thyroid as directed.     Essential Hypertension    This is a chronic problem, home blood pressure numbers 117 over 70s-80s.  Patient follows low-sodium diet.  She is taking lisinopril-hydrochlorothiazide 20-12.5 mg 2 tablets per day        Past Medical History:   Diagnosis Date   • Disorder of thyroid    • GERD (gastroesophageal reflux disease)    • Gynecological disorder     heavy periods   • Hypertension    • Indigestion    • Perioral dermatitis 10/30/2018   • Subcutaneous cyst 6/20/2019     Past Surgical History:   Procedure Laterality Date   • HYSTERECTOMY  LAPAROSCOPY Bilateral 2016    Procedure: HYSTERECTOMY LAPAROSCOPY bilateral salpingectomy;  Surgeon: Nba Benitez M.D.;  Location: SURGERY SAME DAY [facility];  Service:    • VAGINAL SUSPENSION N/A 2016    Procedure: UTEROSACRAL VAULT SUSPENSION;  Surgeon: Nba Benitez M.D.;  Location: SURGERY SAME DAY [facility];  Service:    • CYSTOSCOPY N/A 2016    Procedure: CYSTOSCOPY;  Surgeon: Nba Benitez M.D.;  Location: SURGERY SAME DAY [facility];  Service:    • GYN SURGERY     • OTHER ABDOMINAL SURGERY      gallbladder, appendix   • TUBAL LIGATION       Family History   Problem Relation Age of Onset   • Cancer Mother         thyroid Ca   • Lung Disease Mother         copd   • Hypertension Mother    • Alcohol/Drug Mother    • Lung Cancer Father    • Lung Disease Father         cancer   • Alcohol/Drug Father    • Heart Disease Maternal Grandmother         Fatal MI at 48, sisters had CVAs   • Diabetes Neg Hx      Social History     Tobacco Use   • Smoking status: Former Smoker     Packs/day: 1.00     Years: 15.00     Pack years: 15.00     Types: Cigarettes     Quit date:      Years since quittin.5   • Smokeless tobacco: Never Used   Vaping Use   • Vaping Use: Never used   Substance Use Topics   • Alcohol use: Yes     Alcohol/week: 4.2 oz     Types: 7 Glasses of wine per week     Comment: social   • Drug use: No     Current Outpatient Medications Ordered in Epic   Medication Sig Dispense Refill   • lisinopril-hydrochlorothiazide (PRINZIDE) 20-12.5 MG per tablet Take 2 Tablets by mouth every day. 180 Tablet 1   • mometasone (ASMANEX, 60 METERED DOSES,) 220 MCG/INH inhaler Inhale 1 Puff every day. Rinse mouth after each use. 1 Each 5   • pantoprazole (PROTONIX) 40 MG Tablet Delayed Response Take 1 Tablet by mouth every day. 90 Tablet 1   • ARMOUR THYROID 90 MG Tab Take 1 Tablet by mouth every day. 90 Tablet 1   • albuterol (PROVENTIL) 2.5mg/3ml Nebu Soln solution for nebulization  "3 mL by Nebulization route every four hours as needed for Shortness of Breath. 120 mL 11     No current Epic-ordered facility-administered medications on file.     Health Maintenance: see above     Review of Systems   Constitutional: Negative for chills and fever.   Respiratory: Negative for shortness of breath.    Cardiovascular: Negative for chest pain.   Gastrointestinal: Negative for abdominal pain, nausea and vomiting.   Psychiatric/Behavioral: Negative for depression and suicidal ideas.     Objective:     Exam: BP (!) 144/92 (BP Location: Left arm, Patient Position: Sitting, BP Cuff Size: Adult)   Pulse 76   Temp 35.9 °C (96.7 °F) (Temporal)   Ht 1.626 m (5' 4\")   Wt 76.5 kg (168 lb 10.4 oz)   SpO2 97%  Body mass index is 28.95 kg/m².    Physical Exam  Constitutional:       Appearance: Normal appearance.   HENT:      Head: Normocephalic and atraumatic.      Nose: Nose normal. No congestion.      Mouth/Throat:      Mouth: Mucous membranes are moist.      Pharynx: No oropharyngeal exudate.   Eyes:      General: No scleral icterus.     Comments: Bilateral conjunctival irritation- R>L  Slight superior orbital swelling bilaterally.    Cardiovascular:      Rate and Rhythm: Normal rate and regular rhythm.      Pulses: Normal pulses.      Heart sounds: Normal heart sounds.   Neurological:      Mental Status: She is alert and oriented to person, place, and time.   Psychiatric:         Behavior: Behavior normal.       Labs: Reviewed TSH, free T4 from 2/6/2020.    Assessment & Plan:   51 y.o. female with the following -    Problem List Items Addressed This Visit     Essential hypertension     The patients blood pressure was 144/92 (140/94 on re-measurement) today in the office. She states that her blood pressure is normally 117/70s-80s at home and that her pressure normally elevates when she comes into the office. The patient was instructed to continue monitoring her blood pressure everyday, eat a low sodium diet, " and continue to take her lisinopril/HCTZ as direct. Also lab studies including thyroid panel, CBC, CMP, lipid panel were ordered for further evaluation. The patients blood pressure and lab results will be rechecked at the next appointment.               Relevant Medications    lisinopril-hydrochlorothiazide (PRINZIDE) 20-12.5 MG per tablet    Other Relevant Orders    Comp Metabolic Panel    Lipid Profile    CBC WITH DIFFERENTIAL    TSH    FREE THYROXINE    Eye redness     The patient experience eye swelling and conjunctival changes following the application of a new eye cream. She has not reused the eye cream since the first application.  Likely secondary to allergic reaction.  The patient was instructed to continue avoiding use of the new eye cream and to take OTC benadryl at night or zyrtec/claritin/allegra (which ever she preferred) during the day for her persistent symptoms. She was educated that this may take two weeks to resolve, but to contact the office through [facility] if symptoms persist or worsen. The patient understood and was agreeable to the plan.           Healthcare maintenance    Hypothyroidism due to Hashimoto's thyroiditis     Chronic, clinically euthyroid.  Continue [place] Thyroid 90 mg daily.   -Repeat TSH.           Mild intermittent asthma without complication    Relevant Medications    mometasone (ASMANEX, 60 METERED DOSES,) 220 MCG/INH inhaler      Other Visit Diagnoses     Colon cancer screening        Relevant Orders    Referral to GI for Colonoscopy    Encounter for screening mammogram for breast cancer        Relevant Orders    MA-SCREENING MAMMO BILAT W/TOMOSYNTHESIS W/CAD        Return in about 6 months (around 1/26/2023), or if symptoms worsen or fail to improve.    Please note that this dictation was created using voice recognition software. I have made every reasonable attempt to correct obvious errors, but I expect that there are errors of grammar and possibly content that I did not  discover before finalizing the note.

## 2022-07-26 NOTE — ASSESSMENT & PLAN NOTE
The patient experience eye swelling and conjunctival changes following the application of a new eye cream. She has not reused the eye cream since the first application.  Likely secondary to allergic reaction.  The patient was instructed to continue avoiding use of the new eye cream and to take OTC benadryl at night or zyrtec/claritin/allegra (which ever she preferred) during the day for her persistent symptoms. She was educated that this may take two weeks to resolve, but to contact the office through [facility] if symptoms persist or worsen. The patient understood and was agreeable to the plan.

## 2022-08-29 ENCOUNTER — APPOINTMENT (OUTPATIENT)
Dept: RADIOLOGY | Facility: MEDICAL CENTER | Age: 52
End: 2022-08-29
Attending: INTERNAL MEDICINE
Payer: OTHER MISCELLANEOUS

## 2022-09-20 DIAGNOSIS — J45.20 MILD INTERMITTENT ASTHMA WITHOUT COMPLICATION: ICD-10-CM

## 2022-09-20 DIAGNOSIS — K22.70 BARRETT'S ESOPHAGUS DETERMINED BY ENDOSCOPY: ICD-10-CM

## 2022-09-20 DIAGNOSIS — K21.00 GASTROESOPHAGEAL REFLUX DISEASE WITH ESOPHAGITIS: ICD-10-CM

## 2022-09-20 RX ORDER — PANTOPRAZOLE SODIUM 40 MG/1
40 TABLET, DELAYED RELEASE ORAL
Qty: 90 TABLET | Refills: 1 | Status: SHIPPED | OUTPATIENT
Start: 2022-09-20 | End: 2023-03-29 | Stop reason: SDUPTHER

## 2022-10-18 ENCOUNTER — APPOINTMENT (OUTPATIENT)
Dept: RADIOLOGY | Facility: MEDICAL CENTER | Age: 52
End: 2022-10-18
Attending: INTERNAL MEDICINE
Payer: OTHER MISCELLANEOUS

## 2023-06-02 ENCOUNTER — HOSPITAL ENCOUNTER (OUTPATIENT)
Facility: MEDICAL CENTER | Age: 53
End: 2023-06-02
Attending: PREVENTIVE MEDICINE
Payer: COMMERCIAL

## 2023-06-02 ENCOUNTER — EH NON-PROVIDER (OUTPATIENT)
Dept: OCCUPATIONAL MEDICINE | Facility: CLINIC | Age: 53
End: 2023-06-02

## 2023-06-02 ENCOUNTER — EMPLOYEE HEALTH (OUTPATIENT)
Dept: OCCUPATIONAL MEDICINE | Facility: CLINIC | Age: 53
End: 2023-06-02

## 2023-06-02 DIAGNOSIS — Z02.89 ENCOUNTER FOR OCCUPATIONAL HEALTH ASSESSMENT: ICD-10-CM

## 2023-06-02 DIAGNOSIS — Z02.1 PRE-EMPLOYMENT DRUG SCREENING: ICD-10-CM

## 2023-06-02 LAB
AMP AMPHETAMINE: NORMAL
BAR BARBITURATES: NORMAL
BZO BENZODIAZEPINES: NORMAL
COC COCAINE: NORMAL
INT CON NEG: NORMAL
INT CON POS: NORMAL
MDMA ECSTASY: NORMAL
MET METHAMPHETAMINES: NORMAL
MTD METHADONE: NORMAL
OPI OPIATES: NORMAL
OXY OXYCODONE: NORMAL
PCP PHENCYCLIDINE: NORMAL
POC URINE DRUG SCREEN OCDRS: NEGATIVE
THC: NORMAL

## 2023-06-02 PROCEDURE — 8915 PR COMPREHENSIVE PHYSICAL: Performed by: PREVENTIVE MEDICINE

## 2023-06-02 PROCEDURE — 94375 RESPIRATORY FLOW VOLUME LOOP: CPT | Performed by: PREVENTIVE MEDICINE

## 2023-06-02 PROCEDURE — 80305 DRUG TEST PRSMV DIR OPT OBS: CPT | Performed by: PREVENTIVE MEDICINE

## 2023-06-02 PROCEDURE — 86480 TB TEST CELL IMMUN MEASURE: CPT | Performed by: PREVENTIVE MEDICINE

## 2023-06-02 PROCEDURE — 94010 BREATHING CAPACITY TEST: CPT | Performed by: PREVENTIVE MEDICINE

## 2023-06-05 LAB
GAMMA INTERFERON BACKGROUND BLD IA-ACNC: 0.03 IU/ML
M TB IFN-G BLD-IMP: NEGATIVE
M TB IFN-G CD4+ BCKGRND COR BLD-ACNC: 0 IU/ML
MITOGEN IGNF BCKGRD COR BLD-ACNC: >10 IU/ML
QFT TB2 - NIL TBQ2: 0 IU/ML

## 2023-08-01 ENCOUNTER — HOSPITAL ENCOUNTER (OUTPATIENT)
Dept: RADIOLOGY | Facility: MEDICAL CENTER | Age: 53
End: 2023-08-01
Attending: INTERNAL MEDICINE
Payer: COMMERCIAL

## 2023-08-01 DIAGNOSIS — Z12.31 VISIT FOR SCREENING MAMMOGRAM: ICD-10-CM

## 2023-08-01 PROCEDURE — 77063 BREAST TOMOSYNTHESIS BI: CPT

## 2023-08-16 DIAGNOSIS — E03.8 HYPOTHYROIDISM DUE TO HASHIMOTO'S THYROIDITIS: ICD-10-CM

## 2023-08-16 DIAGNOSIS — E06.3 HYPOTHYROIDISM DUE TO HASHIMOTO'S THYROIDITIS: ICD-10-CM

## 2023-08-16 DIAGNOSIS — K22.70 BARRETT'S ESOPHAGUS DETERMINED BY ENDOSCOPY: ICD-10-CM

## 2023-08-16 DIAGNOSIS — K21.00 GASTROESOPHAGEAL REFLUX DISEASE WITH ESOPHAGITIS: ICD-10-CM

## 2023-08-16 RX ORDER — PANTOPRAZOLE SODIUM 40 MG/1
40 TABLET, DELAYED RELEASE ORAL
Qty: 90 TABLET | Refills: 1 | Status: SHIPPED | OUTPATIENT
Start: 2023-08-16 | End: 2023-10-10 | Stop reason: SDUPTHER

## 2023-08-16 RX ORDER — THYROID,PORK 90 MG
90 TABLET ORAL DAILY
Qty: 90 TABLET | Refills: 0 | Status: SHIPPED | OUTPATIENT
Start: 2023-08-16 | End: 2023-10-10 | Stop reason: SDUPTHER

## 2023-08-16 NOTE — TELEPHONE ENCOUNTER
Received request via: Patient    Was the patient seen in the last year in this department? No    Does the patient have an active prescription (recently filled or refills available) for medication(s) requested? No    Does the patient have care home Plus and need 100 day supply (blood pressure, diabetes and cholesterol meds only)? Patient does not have SCP

## 2023-08-27 SDOH — ECONOMIC STABILITY: INCOME INSECURITY: IN THE LAST 12 MONTHS, WAS THERE A TIME WHEN YOU WERE NOT ABLE TO PAY THE MORTGAGE OR RENT ON TIME?: NO

## 2023-08-27 SDOH — HEALTH STABILITY: PHYSICAL HEALTH: ON AVERAGE, HOW MANY DAYS PER WEEK DO YOU ENGAGE IN MODERATE TO STRENUOUS EXERCISE (LIKE A BRISK WALK)?: 2 DAYS

## 2023-08-27 SDOH — ECONOMIC STABILITY: HOUSING INSECURITY: IN THE LAST 12 MONTHS, HOW MANY PLACES HAVE YOU LIVED?: 1

## 2023-08-27 SDOH — ECONOMIC STABILITY: FOOD INSECURITY: WITHIN THE PAST 12 MONTHS, YOU WORRIED THAT YOUR FOOD WOULD RUN OUT BEFORE YOU GOT MONEY TO BUY MORE.: NEVER TRUE

## 2023-08-27 SDOH — ECONOMIC STABILITY: FOOD INSECURITY: WITHIN THE PAST 12 MONTHS, THE FOOD YOU BOUGHT JUST DIDN'T LAST AND YOU DIDN'T HAVE MONEY TO GET MORE.: NEVER TRUE

## 2023-08-27 SDOH — ECONOMIC STABILITY: TRANSPORTATION INSECURITY
IN THE PAST 12 MONTHS, HAS LACK OF TRANSPORTATION KEPT YOU FROM MEETINGS, WORK, OR FROM GETTING THINGS NEEDED FOR DAILY LIVING?: NO

## 2023-08-27 SDOH — ECONOMIC STABILITY: HOUSING INSECURITY
IN THE LAST 12 MONTHS, WAS THERE A TIME WHEN YOU DID NOT HAVE A STEADY PLACE TO SLEEP OR SLEPT IN A SHELTER (INCLUDING NOW)?: NO

## 2023-08-27 SDOH — ECONOMIC STABILITY: INCOME INSECURITY: HOW HARD IS IT FOR YOU TO PAY FOR THE VERY BASICS LIKE FOOD, HOUSING, MEDICAL CARE, AND HEATING?: NOT HARD AT ALL

## 2023-08-27 SDOH — HEALTH STABILITY: PHYSICAL HEALTH: ON AVERAGE, HOW MANY MINUTES DO YOU ENGAGE IN EXERCISE AT THIS LEVEL?: 50 MIN

## 2023-08-27 SDOH — ECONOMIC STABILITY: TRANSPORTATION INSECURITY
IN THE PAST 12 MONTHS, HAS THE LACK OF TRANSPORTATION KEPT YOU FROM MEDICAL APPOINTMENTS OR FROM GETTING MEDICATIONS?: NO

## 2023-08-27 SDOH — HEALTH STABILITY: MENTAL HEALTH
STRESS IS WHEN SOMEONE FEELS TENSE, NERVOUS, ANXIOUS, OR CAN'T SLEEP AT NIGHT BECAUSE THEIR MIND IS TROUBLED. HOW STRESSED ARE YOU?: RATHER MUCH

## 2023-08-27 SDOH — ECONOMIC STABILITY: TRANSPORTATION INSECURITY
IN THE PAST 12 MONTHS, HAS LACK OF RELIABLE TRANSPORTATION KEPT YOU FROM MEDICAL APPOINTMENTS, MEETINGS, WORK OR FROM GETTING THINGS NEEDED FOR DAILY LIVING?: NO

## 2023-08-27 ASSESSMENT — SOCIAL DETERMINANTS OF HEALTH (SDOH)
HOW OFTEN DO YOU ATTEND CHURCH OR RELIGIOUS SERVICES?: NEVER
HOW OFTEN DO YOU ATTEND CHURCH OR RELIGIOUS SERVICES?: NEVER
IN A TYPICAL WEEK, HOW MANY TIMES DO YOU TALK ON THE PHONE WITH FAMILY, FRIENDS, OR NEIGHBORS?: TWICE A WEEK
HOW OFTEN DO YOU ATTENT MEETINGS OF THE CLUB OR ORGANIZATION YOU BELONG TO?: NEVER
DO YOU BELONG TO ANY CLUBS OR ORGANIZATIONS SUCH AS CHURCH GROUPS UNIONS, FRATERNAL OR ATHLETIC GROUPS, OR SCHOOL GROUPS?: NO
IN A TYPICAL WEEK, HOW MANY TIMES DO YOU TALK ON THE PHONE WITH FAMILY, FRIENDS, OR NEIGHBORS?: TWICE A WEEK
HOW OFTEN DO YOU GET TOGETHER WITH FRIENDS OR RELATIVES?: ONCE A WEEK
DO YOU BELONG TO ANY CLUBS OR ORGANIZATIONS SUCH AS CHURCH GROUPS UNIONS, FRATERNAL OR ATHLETIC GROUPS, OR SCHOOL GROUPS?: NO
HOW OFTEN DO YOU ATTEND CHURCH OR RELIGIOUS SERVICES?: NEVER
DO YOU BELONG TO ANY CLUBS OR ORGANIZATIONS SUCH AS CHURCH GROUPS UNIONS, FRATERNAL OR ATHLETIC GROUPS, OR SCHOOL GROUPS?: NO
DO YOU BELONG TO ANY CLUBS OR ORGANIZATIONS SUCH AS CHURCH GROUPS UNIONS, FRATERNAL OR ATHLETIC GROUPS, OR SCHOOL GROUPS?: NO
HOW OFTEN DO YOU HAVE A DRINK CONTAINING ALCOHOL: 2-3 TIMES A WEEK
HOW HARD IS IT FOR YOU TO PAY FOR THE VERY BASICS LIKE FOOD, HOUSING, MEDICAL CARE, AND HEATING?: NOT HARD AT ALL
HOW OFTEN DO YOU GET TOGETHER WITH FRIENDS OR RELATIVES?: ONCE A WEEK
HOW OFTEN DO YOU GET TOGETHER WITH FRIENDS OR RELATIVES?: ONCE A WEEK
DO YOU BELONG TO ANY CLUBS OR ORGANIZATIONS SUCH AS CHURCH GROUPS UNIONS, FRATERNAL OR ATHLETIC GROUPS, OR SCHOOL GROUPS?: NO
IN A TYPICAL WEEK, HOW MANY TIMES DO YOU TALK ON THE PHONE WITH FAMILY, FRIENDS, OR NEIGHBORS?: TWICE A WEEK
HOW OFTEN DO YOU GET TOGETHER WITH FRIENDS OR RELATIVES?: ONCE A WEEK
HOW OFTEN DO YOU ATTENT MEETINGS OF THE CLUB OR ORGANIZATION YOU BELONG TO?: NEVER
HOW OFTEN DO YOU ATTEND CHURCH OR RELIGIOUS SERVICES?: NEVER
HOW MANY DRINKS CONTAINING ALCOHOL DO YOU HAVE ON A TYPICAL DAY WHEN YOU ARE DRINKING: 1 OR 2
HOW OFTEN DO YOU GET TOGETHER WITH FRIENDS OR RELATIVES?: ONCE A WEEK
HOW OFTEN DO YOU ATTENT MEETINGS OF THE CLUB OR ORGANIZATION YOU BELONG TO?: NEVER
HOW OFTEN DO YOU ATTEND CHURCH OR RELIGIOUS SERVICES?: NEVER
HOW OFTEN DO YOU HAVE SIX OR MORE DRINKS ON ONE OCCASION: NEVER
HOW OFTEN DO YOU ATTENT MEETINGS OF THE CLUB OR ORGANIZATION YOU BELONG TO?: NEVER
IN A TYPICAL WEEK, HOW MANY TIMES DO YOU TALK ON THE PHONE WITH FAMILY, FRIENDS, OR NEIGHBORS?: TWICE A WEEK
IN A TYPICAL WEEK, HOW MANY TIMES DO YOU TALK ON THE PHONE WITH FAMILY, FRIENDS, OR NEIGHBORS?: TWICE A WEEK
HOW OFTEN DO YOU ATTENT MEETINGS OF THE CLUB OR ORGANIZATION YOU BELONG TO?: NEVER
WITHIN THE PAST 12 MONTHS, YOU WORRIED THAT YOUR FOOD WOULD RUN OUT BEFORE YOU GOT THE MONEY TO BUY MORE: NEVER TRUE

## 2023-08-27 ASSESSMENT — LIFESTYLE VARIABLES
AUDIT-C TOTAL SCORE: 3
SKIP TO QUESTIONS 9-10: 1
HOW OFTEN DO YOU HAVE SIX OR MORE DRINKS ON ONE OCCASION: NEVER
HOW OFTEN DO YOU HAVE SIX OR MORE DRINKS ON ONE OCCASION: NEVER
HOW OFTEN DO YOU HAVE A DRINK CONTAINING ALCOHOL: 2-3 TIMES A WEEK
HOW MANY STANDARD DRINKS CONTAINING ALCOHOL DO YOU HAVE ON A TYPICAL DAY: 1 OR 2
HOW OFTEN DO YOU HAVE A DRINK CONTAINING ALCOHOL: 2-3 TIMES A WEEK
HOW OFTEN DO YOU HAVE A DRINK CONTAINING ALCOHOL: 2-3 TIMES A WEEK
SKIP TO QUESTIONS 9-10: 1
HOW MANY STANDARD DRINKS CONTAINING ALCOHOL DO YOU HAVE ON A TYPICAL DAY: 1 OR 2
SKIP TO QUESTIONS 9-10: 1
HOW OFTEN DO YOU HAVE SIX OR MORE DRINKS ON ONE OCCASION: NEVER
SKIP TO QUESTIONS 9-10: 1
AUDIT-C TOTAL SCORE: 3
HOW OFTEN DO YOU HAVE SIX OR MORE DRINKS ON ONE OCCASION: NEVER
HOW MANY STANDARD DRINKS CONTAINING ALCOHOL DO YOU HAVE ON A TYPICAL DAY: 1 OR 2
HOW MANY STANDARD DRINKS CONTAINING ALCOHOL DO YOU HAVE ON A TYPICAL DAY: 1 OR 2
HOW OFTEN DO YOU HAVE A DRINK CONTAINING ALCOHOL: 2-3 TIMES A WEEK

## 2023-08-28 ENCOUNTER — APPOINTMENT (OUTPATIENT)
Dept: MEDICAL GROUP | Facility: MEDICAL CENTER | Age: 53
End: 2023-08-28
Payer: COMMERCIAL

## 2023-08-28 SDOH — HEALTH STABILITY: PHYSICAL HEALTH: ON AVERAGE, HOW MANY DAYS PER WEEK DO YOU ENGAGE IN MODERATE TO STRENUOUS EXERCISE (LIKE A BRISK WALK)?: 2 DAYS

## 2023-08-28 SDOH — ECONOMIC STABILITY: HOUSING INSECURITY: IN THE LAST 12 MONTHS, HOW MANY PLACES HAVE YOU LIVED?: 1

## 2023-08-28 SDOH — HEALTH STABILITY: PHYSICAL HEALTH: ON AVERAGE, HOW MANY MINUTES DO YOU ENGAGE IN EXERCISE AT THIS LEVEL?: 50 MIN

## 2023-08-28 ASSESSMENT — SOCIAL DETERMINANTS OF HEALTH (SDOH)
HOW OFTEN DO YOU HAVE A DRINK CONTAINING ALCOHOL: 2-3 TIMES A WEEK
IN A TYPICAL WEEK, HOW MANY TIMES DO YOU TALK ON THE PHONE WITH FAMILY, FRIENDS, OR NEIGHBORS?: TWICE A WEEK
HOW OFTEN DO YOU ATTENT MEETINGS OF THE CLUB OR ORGANIZATION YOU BELONG TO?: NEVER
HOW OFTEN DO YOU ATTEND CHURCH OR RELIGIOUS SERVICES?: NEVER
HOW HARD IS IT FOR YOU TO PAY FOR THE VERY BASICS LIKE FOOD, HOUSING, MEDICAL CARE, AND HEATING?: NOT HARD AT ALL
WITHIN THE PAST 12 MONTHS, YOU WORRIED THAT YOUR FOOD WOULD RUN OUT BEFORE YOU GOT THE MONEY TO BUY MORE: NEVER TRUE
HOW MANY DRINKS CONTAINING ALCOHOL DO YOU HAVE ON A TYPICAL DAY WHEN YOU ARE DRINKING: 1 OR 2
HOW OFTEN DO YOU GET TOGETHER WITH FRIENDS OR RELATIVES?: ONCE A WEEK
DO YOU BELONG TO ANY CLUBS OR ORGANIZATIONS SUCH AS CHURCH GROUPS UNIONS, FRATERNAL OR ATHLETIC GROUPS, OR SCHOOL GROUPS?: NO
HOW OFTEN DO YOU HAVE SIX OR MORE DRINKS ON ONE OCCASION: NEVER

## 2023-08-29 ENCOUNTER — APPOINTMENT (OUTPATIENT)
Dept: MEDICAL GROUP | Facility: MEDICAL CENTER | Age: 53
End: 2023-08-29
Payer: COMMERCIAL

## 2023-08-29 SDOH — HEALTH STABILITY: PHYSICAL HEALTH: ON AVERAGE, HOW MANY MINUTES DO YOU ENGAGE IN EXERCISE AT THIS LEVEL?: 50 MIN

## 2023-08-29 SDOH — ECONOMIC STABILITY: HOUSING INSECURITY: IN THE LAST 12 MONTHS, HOW MANY PLACES HAVE YOU LIVED?: 1

## 2023-08-29 SDOH — HEALTH STABILITY: PHYSICAL HEALTH: ON AVERAGE, HOW MANY DAYS PER WEEK DO YOU ENGAGE IN MODERATE TO STRENUOUS EXERCISE (LIKE A BRISK WALK)?: 2 DAYS

## 2023-08-29 ASSESSMENT — SOCIAL DETERMINANTS OF HEALTH (SDOH)
HOW OFTEN DO YOU HAVE A DRINK CONTAINING ALCOHOL: 2-3 TIMES A WEEK
HOW HARD IS IT FOR YOU TO PAY FOR THE VERY BASICS LIKE FOOD, HOUSING, MEDICAL CARE, AND HEATING?: NOT HARD AT ALL
HOW MANY DRINKS CONTAINING ALCOHOL DO YOU HAVE ON A TYPICAL DAY WHEN YOU ARE DRINKING: 1 OR 2
HOW OFTEN DO YOU ATTENT MEETINGS OF THE CLUB OR ORGANIZATION YOU BELONG TO?: NEVER
DO YOU BELONG TO ANY CLUBS OR ORGANIZATIONS SUCH AS CHURCH GROUPS UNIONS, FRATERNAL OR ATHLETIC GROUPS, OR SCHOOL GROUPS?: NO
HOW OFTEN DO YOU GET TOGETHER WITH FRIENDS OR RELATIVES?: ONCE A WEEK
HOW OFTEN DO YOU ATTEND CHURCH OR RELIGIOUS SERVICES?: NEVER
HOW OFTEN DO YOU HAVE SIX OR MORE DRINKS ON ONE OCCASION: NEVER
IN A TYPICAL WEEK, HOW MANY TIMES DO YOU TALK ON THE PHONE WITH FAMILY, FRIENDS, OR NEIGHBORS?: TWICE A WEEK
WITHIN THE PAST 12 MONTHS, YOU WORRIED THAT YOUR FOOD WOULD RUN OUT BEFORE YOU GOT THE MONEY TO BUY MORE: NEVER TRUE

## 2023-09-04 SDOH — HEALTH STABILITY: PHYSICAL HEALTH: ON AVERAGE, HOW MANY MINUTES DO YOU ENGAGE IN EXERCISE AT THIS LEVEL?: 50 MIN

## 2023-09-04 SDOH — ECONOMIC STABILITY: HOUSING INSECURITY: IN THE LAST 12 MONTHS, HOW MANY PLACES HAVE YOU LIVED?: 1

## 2023-09-04 SDOH — HEALTH STABILITY: PHYSICAL HEALTH: ON AVERAGE, HOW MANY DAYS PER WEEK DO YOU ENGAGE IN MODERATE TO STRENUOUS EXERCISE (LIKE A BRISK WALK)?: 2 DAYS

## 2023-09-04 ASSESSMENT — SOCIAL DETERMINANTS OF HEALTH (SDOH)
HOW HARD IS IT FOR YOU TO PAY FOR THE VERY BASICS LIKE FOOD, HOUSING, MEDICAL CARE, AND HEATING?: NOT HARD AT ALL
WITHIN THE PAST 12 MONTHS, YOU WORRIED THAT YOUR FOOD WOULD RUN OUT BEFORE YOU GOT THE MONEY TO BUY MORE: NEVER TRUE
IN A TYPICAL WEEK, HOW MANY TIMES DO YOU TALK ON THE PHONE WITH FAMILY, FRIENDS, OR NEIGHBORS?: TWICE A WEEK
DO YOU BELONG TO ANY CLUBS OR ORGANIZATIONS SUCH AS CHURCH GROUPS UNIONS, FRATERNAL OR ATHLETIC GROUPS, OR SCHOOL GROUPS?: NO
HOW OFTEN DO YOU GET TOGETHER WITH FRIENDS OR RELATIVES?: ONCE A WEEK
HOW MANY DRINKS CONTAINING ALCOHOL DO YOU HAVE ON A TYPICAL DAY WHEN YOU ARE DRINKING: 1 OR 2
HOW OFTEN DO YOU HAVE SIX OR MORE DRINKS ON ONE OCCASION: NEVER
HOW OFTEN DO YOU ATTENT MEETINGS OF THE CLUB OR ORGANIZATION YOU BELONG TO?: NEVER
HOW OFTEN DO YOU ATTEND CHURCH OR RELIGIOUS SERVICES?: NEVER
HOW OFTEN DO YOU HAVE A DRINK CONTAINING ALCOHOL: 2-3 TIMES A WEEK

## 2023-09-05 ENCOUNTER — APPOINTMENT (OUTPATIENT)
Dept: MEDICAL GROUP | Facility: MEDICAL CENTER | Age: 53
End: 2023-09-05
Payer: COMMERCIAL

## 2023-09-05 ENCOUNTER — APPOINTMENT (OUTPATIENT)
Dept: URGENT CARE | Facility: CLINIC | Age: 53
End: 2023-09-05
Payer: COMMERCIAL

## 2023-09-12 ENCOUNTER — APPOINTMENT (OUTPATIENT)
Dept: MEDICAL GROUP | Facility: MEDICAL CENTER | Age: 53
End: 2023-09-12
Payer: COMMERCIAL

## 2023-09-26 ENCOUNTER — IMMUNIZATION (OUTPATIENT)
Dept: OCCUPATIONAL MEDICINE | Facility: CLINIC | Age: 53
End: 2023-09-26

## 2023-09-26 DIAGNOSIS — Z23 NEED FOR VACCINATION: Primary | ICD-10-CM

## 2023-09-26 PROCEDURE — 90686 IIV4 VACC NO PRSV 0.5 ML IM: CPT | Performed by: PREVENTIVE MEDICINE

## 2023-10-05 ENCOUNTER — APPOINTMENT (OUTPATIENT)
Dept: MEDICAL GROUP | Facility: MEDICAL CENTER | Age: 53
End: 2023-10-05
Payer: COMMERCIAL

## 2023-10-06 DIAGNOSIS — I10 ESSENTIAL HYPERTENSION: ICD-10-CM

## 2023-10-06 RX ORDER — LISINOPRIL AND HYDROCHLOROTHIAZIDE 20; 12.5 MG/1; MG/1
2 TABLET ORAL
Qty: 180 TABLET | Refills: 0 | Status: SHIPPED | OUTPATIENT
Start: 2023-10-06 | End: 2023-10-10 | Stop reason: SDUPTHER

## 2023-10-10 ENCOUNTER — OFFICE VISIT (OUTPATIENT)
Dept: MEDICAL GROUP | Facility: MEDICAL CENTER | Age: 53
End: 2023-10-10
Payer: COMMERCIAL

## 2023-10-10 ENCOUNTER — APPOINTMENT (OUTPATIENT)
Dept: MEDICAL GROUP | Facility: MEDICAL CENTER | Age: 53
End: 2023-10-10
Payer: COMMERCIAL

## 2023-10-10 VITALS
BODY MASS INDEX: 29.09 KG/M2 | WEIGHT: 164.2 LBS | OXYGEN SATURATION: 96 % | DIASTOLIC BLOOD PRESSURE: 70 MMHG | HEIGHT: 63 IN | HEART RATE: 82 BPM | SYSTOLIC BLOOD PRESSURE: 112 MMHG | TEMPERATURE: 96.3 F

## 2023-10-10 DIAGNOSIS — K21.00 GASTROESOPHAGEAL REFLUX DISEASE WITH ESOPHAGITIS: ICD-10-CM

## 2023-10-10 DIAGNOSIS — Z12.11 COLON CANCER SCREENING: ICD-10-CM

## 2023-10-10 DIAGNOSIS — R73.01 ELEVATED FASTING GLUCOSE: ICD-10-CM

## 2023-10-10 DIAGNOSIS — R30.0 DYSURIA: ICD-10-CM

## 2023-10-10 DIAGNOSIS — I10 ESSENTIAL HYPERTENSION: ICD-10-CM

## 2023-10-10 DIAGNOSIS — E03.8 HYPOTHYROIDISM DUE TO HASHIMOTO'S THYROIDITIS: ICD-10-CM

## 2023-10-10 DIAGNOSIS — E55.9 VITAMIN D DEFICIENCY: ICD-10-CM

## 2023-10-10 DIAGNOSIS — E06.3 HYPOTHYROIDISM DUE TO HASHIMOTO'S THYROIDITIS: ICD-10-CM

## 2023-10-10 DIAGNOSIS — K22.70 BARRETT'S ESOPHAGUS DETERMINED BY ENDOSCOPY: ICD-10-CM

## 2023-10-10 DIAGNOSIS — R00.0 TACHYCARDIA: ICD-10-CM

## 2023-10-10 LAB
APPEARANCE UR: CLEAR
BILIRUB UR STRIP-MCNC: NEGATIVE MG/DL
COLOR UR AUTO: NORMAL
GLUCOSE UR STRIP.AUTO-MCNC: NEGATIVE MG/DL
KETONES UR STRIP.AUTO-MCNC: NEGATIVE MG/DL
LEUKOCYTE ESTERASE UR QL STRIP.AUTO: NEGATIVE
NITRITE UR QL STRIP.AUTO: NEGATIVE
PH UR STRIP.AUTO: 5.5 [PH] (ref 5–8)
PROT UR QL STRIP: NEGATIVE MG/DL
RBC UR QL AUTO: NEGATIVE
SP GR UR STRIP.AUTO: 1.02
UROBILINOGEN UR STRIP-MCNC: 0.2 MG/DL

## 2023-10-10 PROCEDURE — 3074F SYST BP LT 130 MM HG: CPT | Performed by: INTERNAL MEDICINE

## 2023-10-10 PROCEDURE — 99214 OFFICE O/P EST MOD 30 MIN: CPT | Performed by: INTERNAL MEDICINE

## 2023-10-10 PROCEDURE — 81002 URINALYSIS NONAUTO W/O SCOPE: CPT | Performed by: INTERNAL MEDICINE

## 2023-10-10 PROCEDURE — 3078F DIAST BP <80 MM HG: CPT | Performed by: INTERNAL MEDICINE

## 2023-10-10 RX ORDER — LISINOPRIL AND HYDROCHLOROTHIAZIDE 20; 12.5 MG/1; MG/1
1 TABLET ORAL 2 TIMES DAILY
Qty: 180 TABLET | Refills: 3 | Status: SHIPPED | OUTPATIENT
Start: 2023-10-10

## 2023-10-10 RX ORDER — PANTOPRAZOLE SODIUM 40 MG/1
40 TABLET, DELAYED RELEASE ORAL
Qty: 90 TABLET | Refills: 1 | Status: SHIPPED | OUTPATIENT
Start: 2023-10-10

## 2023-10-10 RX ORDER — THYROID,PORK 90 MG
90 TABLET ORAL DAILY
Qty: 90 TABLET | Refills: 3 | Status: SHIPPED | OUTPATIENT
Start: 2023-10-10

## 2023-10-10 ASSESSMENT — ENCOUNTER SYMPTOMS: FLANK PAIN: 0

## 2023-10-10 ASSESSMENT — PATIENT HEALTH QUESTIONNAIRE - PHQ9: CLINICAL INTERPRETATION OF PHQ2 SCORE: 0

## 2023-10-10 NOTE — ASSESSMENT & PLAN NOTE
Doubt UTI since the symptoms have been present for over a month now, possible overactive bladder.  POCT UA:

## 2023-10-10 NOTE — ASSESSMENT & PLAN NOTE
Chronic problem, stable, elevated BP readings at home.  Patient takes lisinopril-HCTZ daily, follows low-sodium diet.  She has not had her blood work done in a long time.  Plan: -Monitor BP at home, bring BP log in 2 weeks and BP cuff for calibration   - Take 1 tablet of lisinopril-HCTZ in the morning and 1 in the evening   - Obtain blood work

## 2023-10-10 NOTE — PROGRESS NOTES
Subjective:     CC:   Chief Complaint   Patient presents with    Medication Follow-up     Blood pressure meds not working     Diagnoses of Essential hypertension, Hypothyroidism due to Hashimoto's thyroiditis, Vitamin D deficiency, Elevated fasting glucose, Colon cancer screening, Mcneil's esophagus determined by endoscopy, Gastroesophageal reflux disease with esophagitis, Tachycardia, and Dysuria were pertinent to this visit.    HPI: Nancie is a pleasant 53 y.o. female who presents today for follow-up, last seen 7/26/2022, pending labs    Problem   Tachycardia    Patient reports occasional episodes of rapid heartbeat approximately 120-130s when walking in the hallway.  The episodes are short-lived, the happen approximately once a week.  No associated chest pain, shortness of breath, sometimes associated with dizziness.         Dysuria    In the end of the  appointment patient brought up symptoms of dysuria, which has been going on for over a month now.     Hypothyroidism Due to Hashimoto's Thyroiditis    The patient has a history of hypothyroidism that she has been taking armour thyroid 90mg qd. The patients last thyroid panel was in 2020.   Did not complete thyroid levels.       Essential Hypertension    This is a chronic problem.  Used to be controlled, patient did notice elevated blood pressure readings at home and about 140 SBP.  Patient follows low-sodium diet.  She lost over 10 pounds since last year.  She is taking lisinopril-hydrochlorothiazide 20-12.5 mg 2 tablets per day.          Past Medical History:   Diagnosis Date    Disorder of thyroid     GERD (gastroesophageal reflux disease)     Gynecological disorder     heavy periods    Hypertension     Indigestion     Perioral dermatitis 10/30/2018    Subcutaneous cyst 6/20/2019     Current Outpatient Medications Ordered in Epic   Medication Sig Dispense Refill    ARMOUR THYROID 90 MG Tab Take 1 Tablet by mouth every day. 90 days courtesy refill.  Please  "complete your thyroid hormone levels for further refills. 90 Tablet 3    lisinopril-hydrochlorothiazide (PRINZIDE) 20-12.5 MG per tablet Take 1 Tablet by mouth 2 times a day. 90-day courtesy refills, please complete your blood work for further refills. 180 Tablet 3    pantoprazole (PROTONIX) 40 MG Tablet Delayed Response Take 1 Tablet by mouth every day. 90 Tablet 1    mometasone (ASMANEX, 60 METERED DOSES,) 220 MCG/INH inhaler Inhale 1 Puff every day. Rinse mouth after each use. 1 Each 5    albuterol (PROVENTIL) 2.5mg/3ml Nebu Soln solution for nebulization 3 mL by Nebulization route every four hours as needed for Shortness of Breath. 120 mL 11     No current [facility]-ordered facility-administered medications on file.     Review of Systems   Genitourinary:  Positive for dysuria and frequency. Negative for flank pain, hematuria and urgency.     Objective:     Exam:  /70 (BP Location: Left arm, Patient Position: Sitting, BP Cuff Size: Adult)   Pulse 82   Temp (!) 35.7 °C (96.3 °F) (Temporal)   Ht 1.6 m (5' 3\")   Wt 74.5 kg (164 lb 3.2 oz)   LMP 08/16/2016   SpO2 96%   BMI 29.09 kg/m²  Body mass index is 29.09 kg/m².    Physical Exam  Constitutional:       Appearance: Normal appearance.   Cardiovascular:      Rate and Rhythm: Normal rate and regular rhythm.      Pulses: Normal pulses.      Heart sounds: Normal heart sounds. No murmur heard.  Pulmonary:      Effort: Pulmonary effort is normal. No respiratory distress.      Breath sounds: Normal breath sounds.   Musculoskeletal:      Right lower leg: No edema.      Left lower leg: No edema.   Neurological:      Mental Status: She is alert and oriented to person, place, and time.   Psychiatric:         Mood and Affect: Mood normal.       Labs: pending     Assessment & Plan:   Nancie  is a pleasant 53 y.o. female with the following -     Problem List Items Addressed This Visit       Essential hypertension (Chronic)     Chronic problem, stable, elevated BP " readings at home.  Patient takes lisinopril-HCTZ daily, follows low-sodium diet.  She has not had her blood work done in a long time.  Plan: -Monitor BP at home, bring BP log in 2 weeks and BP cuff for calibration   - Take 1 tablet of lisinopril-HCTZ in the morning and 1 in the evening   - Obtain blood work             Relevant Medications    lisinopril-hydrochlorothiazide (PRINZIDE) 20-12.5 MG per tablet    Other Relevant Orders    Lipid Profile    CBC WITH DIFFERENTIAL    Comp Metabolic Panel    Hypothyroidism due to Hashimoto's thyroiditis (Chronic)     Chronic problem on [place] Thyroid 90 mg daily, no recent thyroid levels available, reordered.  Follow-up in 2 weeks         Relevant Medications    ARMOUR THYROID 90 MG Tab    Other Relevant Orders    TSH WITH REFLEX TO FT4    Mcneil's esophagus determined by endoscopy    Relevant Medications    pantoprazole (PROTONIX) 40 MG Tablet Delayed Response    Dysuria     Doubt UTI since the symptoms have been present for over a month now, possible overactive bladder.  POCT UA:         Relevant Orders    POCT Urinalysis (Completed)    URINALYSIS,CULTURE IF INDICATED    Gastroesophageal reflux disease with esophagitis    Relevant Medications    pantoprazole (PROTONIX) 40 MG Tablet Delayed Response    Tachycardia     Other Visit Diagnoses       Vitamin D deficiency        Relevant Orders    VITAMIN D,25 HYDROXY (DEFICIENCY)    Elevated fasting glucose        Relevant Orders    HEMOGLOBIN A1C    Colon cancer screening        Relevant Orders    Referral to GI for Colonoscopy          Return in about 2 weeks (around 10/24/2023), or if symptoms worsen or fail to improve, for 2 weeks labs .    Please note that this dictation was created using voice recognition software. I have made every reasonable attempt to correct obvious errors, but I expect that there are errors of grammar and possibly content that I did not discover before finalizing the note.

## 2023-10-10 NOTE — ASSESSMENT & PLAN NOTE
Chronic problem on [place] Thyroid 90 mg daily, no recent thyroid levels available, reordered.  Follow-up in 2 weeks

## 2023-10-11 ENCOUNTER — HOSPITAL ENCOUNTER (OUTPATIENT)
Facility: MEDICAL CENTER | Age: 53
End: 2023-10-11
Attending: INTERNAL MEDICINE
Payer: COMMERCIAL

## 2023-10-11 DIAGNOSIS — R30.0 DYSURIA: ICD-10-CM

## 2023-10-11 LAB
APPEARANCE UR: CLEAR
BILIRUB UR QL STRIP.AUTO: NEGATIVE
COLOR UR: YELLOW
GLUCOSE UR STRIP.AUTO-MCNC: NEGATIVE MG/DL
KETONES UR STRIP.AUTO-MCNC: NEGATIVE MG/DL
LEUKOCYTE ESTERASE UR QL STRIP.AUTO: NEGATIVE
MICRO URNS: NORMAL
NITRITE UR QL STRIP.AUTO: NEGATIVE
PH UR STRIP.AUTO: 5 [PH] (ref 5–8)
PROT UR QL STRIP: NEGATIVE MG/DL
RBC UR QL AUTO: NEGATIVE
SP GR UR STRIP.AUTO: 1.02
UROBILINOGEN UR STRIP.AUTO-MCNC: 0.2 MG/DL

## 2023-10-11 PROCEDURE — 81003 URINALYSIS AUTO W/O SCOPE: CPT

## 2023-10-26 ENCOUNTER — APPOINTMENT (OUTPATIENT)
Dept: MEDICAL GROUP | Facility: MEDICAL CENTER | Age: 53
End: 2023-10-26
Payer: COMMERCIAL

## 2024-04-04 ENCOUNTER — PATIENT MESSAGE (OUTPATIENT)
Dept: MEDICAL GROUP | Facility: MEDICAL CENTER | Age: 54
End: 2024-04-04
Payer: COMMERCIAL

## 2024-04-04 DIAGNOSIS — Z12.11 COLON CANCER SCREENING: ICD-10-CM

## 2024-04-04 DIAGNOSIS — K22.70 BARRETT'S ESOPHAGUS DETERMINED BY ENDOSCOPY: ICD-10-CM

## 2024-04-04 DIAGNOSIS — K21.00 GASTROESOPHAGEAL REFLUX DISEASE WITH ESOPHAGITIS, UNSPECIFIED WHETHER HEMORRHAGE: ICD-10-CM

## 2024-04-25 ENCOUNTER — PATIENT MESSAGE (OUTPATIENT)
Dept: MEDICAL GROUP | Facility: MEDICAL CENTER | Age: 54
End: 2024-04-25
Payer: COMMERCIAL

## 2024-04-25 DIAGNOSIS — Z12.83 SKIN EXAM FOR MALIGNANT NEOPLASM: ICD-10-CM

## 2024-06-30 ENCOUNTER — OFFICE VISIT (OUTPATIENT)
Dept: URGENT CARE | Facility: CLINIC | Age: 54
End: 2024-06-30
Payer: COMMERCIAL

## 2024-06-30 VITALS
OXYGEN SATURATION: 98 % | RESPIRATION RATE: 20 BRPM | HEIGHT: 63 IN | BODY MASS INDEX: 30.78 KG/M2 | TEMPERATURE: 97.7 F | DIASTOLIC BLOOD PRESSURE: 80 MMHG | WEIGHT: 173.7 LBS | HEART RATE: 100 BPM | SYSTOLIC BLOOD PRESSURE: 118 MMHG

## 2024-06-30 DIAGNOSIS — L03.031 CELLULITIS OF GREAT TOE OF RIGHT FOOT: ICD-10-CM

## 2024-06-30 RX ORDER — CEPHALEXIN 500 MG/1
500 CAPSULE ORAL 4 TIMES DAILY
Qty: 28 CAPSULE | Refills: 0 | Status: SHIPPED | OUTPATIENT
Start: 2024-06-30 | End: 2024-07-07

## 2024-06-30 NOTE — LETTER
June 30, 2024         Patient: Nancie Ugalde   YOB: 1970   Date of Visit: 6/30/2024           To Whom it May Concern:    Nancie Ugalde was seen in my clinic on 6/30/2024. She has been instructed to elevate her foot for the next 48 hours, please excuse her from work accordingly.    If you have any questions or concerns, please don't hesitate to call.        Sincerely,           SHAUN Catherine.  Electronically Signed

## 2024-06-30 NOTE — PROGRESS NOTES
Chief Complaint   Patient presents with    Toe Pain     Woke up with toe red and swollen today on top of cuticle        HISTORY OF PRESENT ILLNESS: Patient is a pleasant 53 y.o. female who presents to urgent care today with concerns of right great toe pain.  Patient notes that she has had symptoms for the last 3 days, worsening today.  Endorses associated erythema and swelling.  Denies any fever but does endorse malaise and fatigue.  She recently did receive a pedicure.  Otherwise denies any recent injury or trauma.    Patient Active Problem List    Diagnosis Date Noted    Tachycardia 10/10/2023    Dysuria 10/10/2023    Eye redness 07/26/2022    Healthcare maintenance 07/26/2022    Tonsillar hypertrophy 08/20/2021    Cough 08/09/2020    Mild intermittent asthma without complication 07/02/2020    Epistaxis 01/21/2020    History of DVT (deep vein thrombosis) 11/14/2019    Obstructive sleep apnea syndrome 07/23/2018    AK (actinic keratosis) 07/23/2018    Pure hyperglyceridemia 06/15/2018    Hypothyroidism due to Hashimoto's thyroiditis 06/15/2018    Weight gain, abnormal 11/15/2017    Hypertriglyceridemia 11/15/2017    Essential hypertension 11/15/2017    Gastroesophageal reflux disease with esophagitis 11/15/2017    Mcneil's esophagus determined by endoscopy 11/15/2017       Allergies:Nkda [no known drug allergy]    Current Outpatient Medications Ordered in Epic   Medication Sig Dispense Refill    cephALEXin (KEFLEX) 500 MG Cap Take 1 Capsule by mouth 4 times a day for 7 days. 28 Capsule 0    ARMOUR THYROID 90 MG Tab Take 1 Tablet by mouth every day. 90 days courtesy refill.  Please complete your thyroid hormone levels for further refills. 90 Tablet 3    lisinopril-hydrochlorothiazide (PRINZIDE) 20-12.5 MG per tablet Take 1 Tablet by mouth 2 times a day. 90-day courtesy refills, please complete your blood work for further refills. 180 Tablet 3    pantoprazole (PROTONIX) 40 MG Tablet Delayed Response Take 1 Tablet by  mouth every day. 90 Tablet 1    mometasone (ASMANEX, 60 METERED DOSES,) 220 MCG/INH inhaler Inhale 1 Puff every day. Rinse mouth after each use. 1 Each 5    albuterol (PROVENTIL) 2.5mg/3ml Nebu Soln solution for nebulization 3 mL by Nebulization route every four hours as needed for Shortness of Breath. 120 mL 11     No current [facility]-ordered facility-administered medications on file.       Past Medical History:   Diagnosis Date    Disorder of thyroid     GERD (gastroesophageal reflux disease)     Gynecological disorder     heavy periods    Hypertension     Indigestion     Perioral dermatitis 10/30/2018    Subcutaneous cyst 2019       Social History     Tobacco Use    Smoking status: Former     Current packs/day: 0.00     Average packs/day: 1 pack/day for 15.0 years (15.0 ttl pk-yrs)     Types: Cigarettes     Start date:      Quit date:      Years since quittin.5    Smokeless tobacco: Never   Vaping Use    Vaping status: Never Used   Substance Use Topics    Alcohol use: Yes     Alcohol/week: 4.2 oz     Types: 7 Glasses of wine per week     Comment: social    Drug use: No       Family Status   Relation Name Status    Mo      Fa      MGMo      Neg Hx  (Not Specified)     Family History   Problem Relation Age of Onset    Cancer Mother         thyroid Ca    Lung Disease Mother         copd    Hypertension Mother     Alcohol/Drug Mother     Lung Cancer Father     Lung Disease Father         cancer    Alcohol/Drug Father     Heart Disease Maternal Grandmother         Fatal MI at 48, sisters had CVAs    Diabetes Neg Hx        ROS:  Review of Systems   Constitutional: Negative for fever, chills, weight loss, malaise, and fatigue.   HENT: Negative for ear pain, nosebleeds, congestion, sore throat and neck pain.    Eyes: Negative for vision changes.   Neuro: Negative for headache, sensory changes, weakness, seizure, LOC.   Cardiovascular: Negative for chest pain, palpitations, orthopnea  "and leg swelling.   Respiratory: Negative for cough, sputum production, shortness of breath and wheezing.   Gastrointestinal: Negative for abdominal pain, nausea, vomiting or diarrhea.   Genitourinary: Negative for dysuria, urgency and frequency.  Musculoskeletal: Positive for great toe pain.  Negative for falls, neck pain, back pain, joint pain, myalgias.   Skin: Positive for swelling and erythema.  Negative for rash, diaphoresis.     Exam:  /80 (BP Location: Left arm, Patient Position: Sitting)   Pulse 100   Temp 36.5 °C (97.7 °F) (Temporal)   Resp 20   Ht 1.6 m (5' 3\")   Wt 78.8 kg (173 lb 11.2 oz)   SpO2 98%   General: well-nourished, well-developed female in NAD  Head: normocephalic, atraumatic  Eyes: PERRLA, no conjunctival injection, acuity grossly intact, lids normal.  Ears: normal shape and symmetry, no tenderness, no discharge. External canals are without any significant edema or erythema. Tympanic membranes are without any inflammation, no effusion. Gross auditory acuity is intact.  Nose: symmetrical without tenderness, no discharge.  Mouth/Throat: reasonable hygiene, no erythema, exudates or tonsillar enlargement.  Neck: no masses, range of motion within normal limits, no tracheal deviation. No obvious thyroid enlargement.   Lymph: no cervical adenopathy. No supraclavicular adenopathy.   Neuro: alert and oriented. Cranial nerves 1-12 grossly intact. No sensory deficit.   Cardiovascular: regular rate and rhythm. No edema.  Pulmonary: no distress. Chest is symmetrical with respiration, no wheezes, crackles, or rhonchi.   Musculoskeletal: no clubbing, appropriate muscle tone, gait is stable.  Right great toe: There is swelling, erythema, tenderness noted to distal aspect.  No signs of abscess, no drainage.  No lymphangitis.  Toe has full range of motion, neurovascular intact.  Skin: warm, dry, intact, no clubbing, no cyanosis, no rashes.   Psych: appropriate mood, affect, judgement. "         Assessment/Plan:  1. Cellulitis of great toe of right foot  cephALEXin (KEFLEX) 500 MG Cap          Presentation consistent with cellulitis, no signs of abscess.  Keflex as directed.  OTC Motrin encouraged.  Elevation also encouraged.  Supportive care, differential diagnoses, and indications for immediate follow-up discussed with patient.   Pathogenesis of diagnosis discussed including typical length and natural progression.   Instructed to return to clinic or nearest emergency department for any change in condition, further concerns, or worsening of symptoms.  Patient states understanding of the plan of care and discharge instructions.  Instructed to make an appointment, for follow up, with her primary care provider.        Please note that this dictation was created using voice recognition software. I have made every reasonable attempt to correct obvious errors, but I expect that there are errors of grammar and possibly content that I did not discover before finalizing the note.      SHAUN Catherine.

## 2024-07-18 ENCOUNTER — OFFICE VISIT (OUTPATIENT)
Dept: DERMATOLOGY | Facility: IMAGING CENTER | Age: 54
End: 2024-07-18
Payer: COMMERCIAL

## 2024-07-18 DIAGNOSIS — L81.2 EPHELIDES: ICD-10-CM

## 2024-07-18 DIAGNOSIS — D18.01 CHERRY ANGIOMA: ICD-10-CM

## 2024-07-18 DIAGNOSIS — L81.4 LENTIGINES: ICD-10-CM

## 2024-07-18 DIAGNOSIS — Z12.83 SKIN CANCER SCREENING: ICD-10-CM

## 2024-07-18 DIAGNOSIS — D22.9 MULTIPLE NEVI: ICD-10-CM

## 2024-07-18 PROCEDURE — 99213 OFFICE O/P EST LOW 20 MIN: CPT | Performed by: NURSE PRACTITIONER

## 2024-07-29 ENCOUNTER — HOSPITAL ENCOUNTER (OUTPATIENT)
Dept: LAB | Facility: MEDICAL CENTER | Age: 54
End: 2024-07-29
Attending: INTERNAL MEDICINE
Payer: COMMERCIAL

## 2024-07-29 DIAGNOSIS — E03.8 HYPOTHYROIDISM DUE TO HASHIMOTO'S THYROIDITIS: ICD-10-CM

## 2024-07-29 DIAGNOSIS — E55.9 VITAMIN D DEFICIENCY: ICD-10-CM

## 2024-07-29 DIAGNOSIS — E06.3 HYPOTHYROIDISM DUE TO HASHIMOTO'S THYROIDITIS: ICD-10-CM

## 2024-07-29 DIAGNOSIS — I10 ESSENTIAL HYPERTENSION: ICD-10-CM

## 2024-07-29 DIAGNOSIS — R73.01 ELEVATED FASTING GLUCOSE: ICD-10-CM

## 2024-07-29 LAB
25(OH)D3 SERPL-MCNC: 42 NG/ML (ref 30–100)
ALBUMIN SERPL BCP-MCNC: 4.3 G/DL (ref 3.2–4.9)
ALBUMIN/GLOB SERPL: 1.4 G/DL
ALP SERPL-CCNC: 92 U/L (ref 30–99)
ALT SERPL-CCNC: 36 U/L (ref 2–50)
ANION GAP SERPL CALC-SCNC: 14 MMOL/L (ref 7–16)
AST SERPL-CCNC: 24 U/L (ref 12–45)
BASOPHILS # BLD AUTO: 1.1 % (ref 0–1.8)
BASOPHILS # BLD: 0.09 K/UL (ref 0–0.12)
BILIRUB SERPL-MCNC: 0.5 MG/DL (ref 0.1–1.5)
BUN SERPL-MCNC: 16 MG/DL (ref 8–22)
CALCIUM ALBUM COR SERPL-MCNC: 9.8 MG/DL (ref 8.5–10.5)
CALCIUM SERPL-MCNC: 10 MG/DL (ref 8.5–10.5)
CHLORIDE SERPL-SCNC: 103 MMOL/L (ref 96–112)
CHOLEST SERPL-MCNC: 197 MG/DL (ref 100–199)
CO2 SERPL-SCNC: 23 MMOL/L (ref 20–33)
CREAT SERPL-MCNC: 0.76 MG/DL (ref 0.5–1.4)
EOSINOPHIL # BLD AUTO: 0.32 K/UL (ref 0–0.51)
EOSINOPHIL NFR BLD: 3.8 % (ref 0–6.9)
ERYTHROCYTE [DISTWIDTH] IN BLOOD BY AUTOMATED COUNT: 39.8 FL (ref 35.9–50)
EST. AVERAGE GLUCOSE BLD GHB EST-MCNC: 100 MG/DL
FASTING STATUS PATIENT QL REPORTED: NORMAL
GFR SERPLBLD CREATININE-BSD FMLA CKD-EPI: 93 ML/MIN/1.73 M 2
GLOBULIN SER CALC-MCNC: 3 G/DL (ref 1.9–3.5)
GLUCOSE SERPL-MCNC: 110 MG/DL (ref 65–99)
HBA1C MFR BLD: 5.1 % (ref 4–5.6)
HCT VFR BLD AUTO: 40.7 % (ref 37–47)
HDLC SERPL-MCNC: 37 MG/DL
HGB BLD-MCNC: 14.1 G/DL (ref 12–16)
IMM GRANULOCYTES # BLD AUTO: 0.03 K/UL (ref 0–0.11)
IMM GRANULOCYTES NFR BLD AUTO: 0.4 % (ref 0–0.9)
LDLC SERPL CALC-MCNC: 119 MG/DL
LYMPHOCYTES # BLD AUTO: 3.03 K/UL (ref 1–4.8)
LYMPHOCYTES NFR BLD: 36.1 % (ref 22–41)
MCH RBC QN AUTO: 31.1 PG (ref 27–33)
MCHC RBC AUTO-ENTMCNC: 34.6 G/DL (ref 32.2–35.5)
MCV RBC AUTO: 89.8 FL (ref 81.4–97.8)
MONOCYTES # BLD AUTO: 0.6 K/UL (ref 0–0.85)
MONOCYTES NFR BLD AUTO: 7.1 % (ref 0–13.4)
NEUTROPHILS # BLD AUTO: 4.33 K/UL (ref 1.82–7.42)
NEUTROPHILS NFR BLD: 51.5 % (ref 44–72)
NRBC # BLD AUTO: 0 K/UL
NRBC BLD-RTO: 0 /100 WBC (ref 0–0.2)
PLATELET # BLD AUTO: 365 K/UL (ref 164–446)
PMV BLD AUTO: 10.5 FL (ref 9–12.9)
POTASSIUM SERPL-SCNC: 3.8 MMOL/L (ref 3.6–5.5)
PROT SERPL-MCNC: 7.3 G/DL (ref 6–8.2)
RBC # BLD AUTO: 4.53 M/UL (ref 4.2–5.4)
SODIUM SERPL-SCNC: 140 MMOL/L (ref 135–145)
TRIGL SERPL-MCNC: 205 MG/DL (ref 0–149)
TSH SERPL DL<=0.005 MIU/L-ACNC: 1.43 UIU/ML (ref 0.38–5.33)
WBC # BLD AUTO: 8.4 K/UL (ref 4.8–10.8)

## 2024-07-29 PROCEDURE — 80061 LIPID PANEL: CPT

## 2024-07-29 PROCEDURE — 83036 HEMOGLOBIN GLYCOSYLATED A1C: CPT

## 2024-07-29 PROCEDURE — 36415 COLL VENOUS BLD VENIPUNCTURE: CPT

## 2024-07-29 PROCEDURE — 85025 COMPLETE CBC W/AUTO DIFF WBC: CPT

## 2024-07-29 PROCEDURE — 80053 COMPREHEN METABOLIC PANEL: CPT

## 2024-07-29 PROCEDURE — 84443 ASSAY THYROID STIM HORMONE: CPT

## 2024-07-29 PROCEDURE — 82306 VITAMIN D 25 HYDROXY: CPT

## 2024-09-11 ENCOUNTER — APPOINTMENT (OUTPATIENT)
Dept: MEDICAL GROUP | Facility: MEDICAL CENTER | Age: 54
End: 2024-09-11
Payer: COMMERCIAL

## 2024-09-26 ENCOUNTER — OFFICE VISIT (OUTPATIENT)
Dept: URGENT CARE | Facility: CLINIC | Age: 54
End: 2024-09-26
Payer: COMMERCIAL

## 2024-09-26 ENCOUNTER — HOSPITAL ENCOUNTER (OUTPATIENT)
Facility: MEDICAL CENTER | Age: 54
End: 2024-09-26
Attending: NURSE PRACTITIONER
Payer: COMMERCIAL

## 2024-09-26 ENCOUNTER — PHARMACY VISIT (OUTPATIENT)
Dept: PHARMACY | Facility: MEDICAL CENTER | Age: 54
End: 2024-09-26
Payer: COMMERCIAL

## 2024-09-26 VITALS
TEMPERATURE: 97.8 F | DIASTOLIC BLOOD PRESSURE: 72 MMHG | HEIGHT: 63 IN | BODY MASS INDEX: 30.79 KG/M2 | RESPIRATION RATE: 13 BRPM | HEART RATE: 110 BPM | SYSTOLIC BLOOD PRESSURE: 126 MMHG | OXYGEN SATURATION: 96 % | WEIGHT: 173.8 LBS

## 2024-09-26 DIAGNOSIS — N39.0 ACUTE URINARY TRACT INFECTION: ICD-10-CM

## 2024-09-26 LAB
APPEARANCE UR: CLEAR
BILIRUB UR STRIP-MCNC: NEGATIVE MG/DL
COLOR UR AUTO: NORMAL
GLUCOSE UR STRIP.AUTO-MCNC: NEGATIVE MG/DL
KETONES UR STRIP.AUTO-MCNC: NEGATIVE MG/DL
LEUKOCYTE ESTERASE UR QL STRIP.AUTO: NORMAL
NITRITE UR QL STRIP.AUTO: POSITIVE
PH UR STRIP.AUTO: 6 [PH] (ref 5–8)
PROT UR QL STRIP: NEGATIVE MG/DL
RBC UR QL AUTO: NORMAL
SP GR UR STRIP.AUTO: 1.01
UROBILINOGEN UR STRIP-MCNC: 0.2 MG/DL

## 2024-09-26 PROCEDURE — 87086 URINE CULTURE/COLONY COUNT: CPT

## 2024-09-26 PROCEDURE — RXMED WILLOW AMBULATORY MEDICATION CHARGE: Performed by: NURSE PRACTITIONER

## 2024-09-26 PROCEDURE — 87186 SC STD MICRODIL/AGAR DIL: CPT

## 2024-09-26 PROCEDURE — 99213 OFFICE O/P EST LOW 20 MIN: CPT | Performed by: NURSE PRACTITIONER

## 2024-09-26 PROCEDURE — 3074F SYST BP LT 130 MM HG: CPT | Performed by: NURSE PRACTITIONER

## 2024-09-26 PROCEDURE — 81002 URINALYSIS NONAUTO W/O SCOPE: CPT | Performed by: NURSE PRACTITIONER

## 2024-09-26 PROCEDURE — 3078F DIAST BP <80 MM HG: CPT | Performed by: NURSE PRACTITIONER

## 2024-09-26 PROCEDURE — 87077 CULTURE AEROBIC IDENTIFY: CPT

## 2024-09-26 RX ORDER — NITROFURANTOIN 25; 75 MG/1; MG/1
100 CAPSULE ORAL 2 TIMES DAILY
Qty: 10 CAPSULE | Refills: 0 | Status: SHIPPED | OUTPATIENT
Start: 2024-09-26 | End: 2024-10-01

## 2024-09-26 ASSESSMENT — FIBROSIS 4 INDEX: FIB4 SCORE: 0.59

## 2024-09-26 NOTE — PROGRESS NOTES
Chief Complaint   Patient presents with    UTI     Pt has painful urination, frequent urination, burning urination x yesterday        HISTORY OF PRESENT ILLNESS: Patient is a pleasant 54 y.o. female who presents today due to two days of urinary burning, urgency, and frequency. Reports some associated pelvic pressure. Denies hematuria, fever, flank pain, N/V. Denies a history of pyelonephritis or kidney stones. Admits a history of UTIs, last one was years ago, feels similar.     Patient Active Problem List    Diagnosis Date Noted    Tachycardia 10/10/2023    Dysuria 10/10/2023    Eye redness 07/26/2022    Healthcare maintenance 07/26/2022    Tonsillar hypertrophy 08/20/2021    Cough 08/09/2020    Mild intermittent asthma without complication 07/02/2020    Epistaxis 01/21/2020    History of DVT (deep vein thrombosis) 11/14/2019    Obstructive sleep apnea syndrome 07/23/2018    AK (actinic keratosis) 07/23/2018    Pure hyperglyceridemia 06/15/2018    Hypothyroidism due to Hashimoto's thyroiditis 06/15/2018    Weight gain, abnormal 11/15/2017    Hypertriglyceridemia 11/15/2017    Essential hypertension 11/15/2017    Gastroesophageal reflux disease with esophagitis 11/15/2017    Mcneil's esophagus determined by endoscopy 11/15/2017       Allergies:Nkda [no known drug allergy]    Current Outpatient Medications Ordered in Epic   Medication Sig Dispense Refill    Phenazopyridine HCl (AZO URINARY PAIN RELIEF PO) Take  by mouth.      nitrofurantoin (MACROBID) 100 MG Cap Take 1 Capsule by mouth 2 times a day for 5 days. 10 Capsule 0    ARMOUR THYROID 90 MG Tab Take 1 Tablet by mouth every day. 90 days courtesy refill.  Please complete your thyroid hormone levels for further refills. 90 Tablet 3    lisinopril-hydrochlorothiazide (PRINZIDE) 20-12.5 MG per tablet Take 1 Tablet by mouth 2 times a day. 90-day courtesy refills, please complete your blood work for further refills. 180 Tablet 3    pantoprazole (PROTONIX) 40 MG Tablet  Delayed Response Take 1 Tablet by mouth every day. 90 Tablet 1    albuterol (PROVENTIL) 2.5mg/3ml Nebu Soln solution for nebulization 3 mL by Nebulization route every four hours as needed for Shortness of Breath. 120 mL 11     No current [facility]-ordered facility-administered medications on file.       Past Medical History:   Diagnosis Date    Disorder of thyroid     GERD (gastroesophageal reflux disease)     Gynecological disorder     heavy periods    Hypertension     Indigestion     Perioral dermatitis 10/30/2018    Subcutaneous cyst 2019       Social History     Tobacco Use    Smoking status: Former     Current packs/day: 0.00     Average packs/day: 1 pack/day for 15.0 years (15.0 ttl pk-yrs)     Types: Cigarettes     Start date:      Quit date:      Years since quittin.7    Smokeless tobacco: Never   Vaping Use    Vaping status: Never Used   Substance Use Topics    Alcohol use: Not Currently     Alcohol/week: 4.2 oz     Types: 7 Glasses of wine per week     Comment: social    Drug use: No       Family Status   Relation Name Status    Mo      Fa      MGMo      Neg Hx  (Not Specified)   No partnership data on file     Family History   Problem Relation Age of Onset    Cancer Mother         thyroid Ca    Lung Disease Mother         copd    Hypertension Mother     Alcohol/Drug Mother     Lung Cancer Father     Lung Disease Father         cancer    Alcohol/Drug Father     Heart Disease Maternal Grandmother         Fatal MI at 48, sisters had CVAs    Diabetes Neg Hx        ROS:  Review of Systems   Constitutional: Negative for fever, chills, weight loss and malaise/fatigue.   HENT: Negative for ear pain, nosebleeds, congestion, sore throat and neck pain.    Eyes: Negative for vision changes.   Neuro: Negative for headache, sensory changes, weakness, seizure, LOC.   Cardiovascular: Negative for chest pain, palpitations, orthopnea and leg swelling.   Respiratory: Negative for cough,  "sputum production, shortness of breath and wheezing.   Gastrointestinal: Positive for lower abdominal pressure. Negative for abdominal pain, nausea, vomiting or diarrhea.   Genitourinary: Positive for dysuria, urgency and frequency. Negative for hematuria or flank pain.   Skin: Negative for rash, diaphoresis.     Exam:  /72 (BP Location: Left arm, Patient Position: Sitting, BP Cuff Size: Adult)   Pulse (!) 110   Temp 36.6 °C (97.8 °F) (Temporal)   Resp 13   Ht 1.6 m (5' 3\")   Wt 78.8 kg (173 lb 12.8 oz)   SpO2 96%   General: well-nourished, well-developed female in NAD  Head: normocephalic, atraumatic  Eyes: PERRLA, no conjunctival injection, acuity grossly intact, lids normal.  Lymph: no cervical adenopathy. No supraclavicular adenopathy.   Neuro: alert and oriented. Cranial nerves 1-12 grossly intact. No sensory deficit.   Cardiovascular: Tachycardic rate and regular rhythm. No edema.  Pulmonary: no distress. Chest is symmetrical with respiration, no wheezes, crackles, or rhonchi.   Abdomen: soft, non-tender, no guarding, no hepatosplenomegaly. No CVA tenderness.   Musculoskeletal: no clubbing, appropriate muscle tone, gait is stable.  Skin: warm, dry, intact, no clubbing, no cyanosis, no rashes.   Psych: appropriate mood, affect, judgement.         Assessment/Plan:  1. Acute urinary tract infection  POCT Urinalysis    URINE CULTURE(NEW)    nitrofurantoin (MACROBID) 100 MG Cap            Presentation consistent with cystitis, Macrobid as directed.  Patient is slightly tachycardic in clinic, states this is somewhat normal for her, I have encouraged her to increase her fluid intake and follow-up with her PCP regarding.  Urine sent for culture.   Supportive care, differential diagnoses, and indications for immediate follow-up discussed with patient.   Pathogenesis of diagnosis discussed including typical length and natural progression.   Instructed to return to clinic or nearest emergency department for " any change in condition, further concerns, or worsening of symptoms.  Patient states understanding of the plan of care and discharge instructions.  Instructed to make an appointment, for follow up, with their primary care provider.        Please note that this dictation was created using voice recognition software. I have made every reasonable attempt to correct obvious errors, but I expect that there are errors of grammar and possibly content that I did not discover before finalizing the note.      SHAUN Catherine.

## 2024-10-02 ENCOUNTER — APPOINTMENT (OUTPATIENT)
Dept: MEDICAL GROUP | Facility: MEDICAL CENTER | Age: 54
End: 2024-10-02
Payer: COMMERCIAL

## 2024-10-15 ENCOUNTER — IMMUNIZATION (OUTPATIENT)
Dept: OCCUPATIONAL MEDICINE | Facility: CLINIC | Age: 54
End: 2024-10-15

## 2024-10-15 DIAGNOSIS — Z23 NEED FOR VACCINATION: Primary | ICD-10-CM

## 2024-10-15 PROCEDURE — 90656 IIV3 VACC NO PRSV 0.5 ML IM: CPT

## 2024-10-28 DIAGNOSIS — K22.70 BARRETT'S ESOPHAGUS DETERMINED BY ENDOSCOPY: ICD-10-CM

## 2024-10-28 DIAGNOSIS — K21.00 GASTROESOPHAGEAL REFLUX DISEASE WITH ESOPHAGITIS: ICD-10-CM

## 2024-10-28 DIAGNOSIS — E06.3 HYPOTHYROIDISM DUE TO HASHIMOTO'S THYROIDITIS: ICD-10-CM

## 2024-10-28 DIAGNOSIS — I10 ESSENTIAL HYPERTENSION: ICD-10-CM

## 2024-10-29 PROCEDURE — RXMED WILLOW AMBULATORY MEDICATION CHARGE: Performed by: INTERNAL MEDICINE

## 2024-10-29 RX ORDER — PANTOPRAZOLE SODIUM 40 MG/1
40 TABLET, DELAYED RELEASE ORAL
Qty: 90 TABLET | Refills: 0 | Status: SHIPPED | OUTPATIENT
Start: 2024-10-29

## 2024-10-29 RX ORDER — THYROID,PORK 90 MG
90 TABLET ORAL DAILY
Qty: 90 TABLET | Refills: 0 | Status: SHIPPED | OUTPATIENT
Start: 2024-10-29

## 2024-10-29 RX ORDER — LISINOPRIL AND HYDROCHLOROTHIAZIDE 12.5; 2 MG/1; MG/1
1 TABLET ORAL 2 TIMES DAILY
Qty: 180 TABLET | Refills: 0 | Status: SHIPPED | OUTPATIENT
Start: 2024-10-29

## 2024-10-31 ENCOUNTER — PHARMACY VISIT (OUTPATIENT)
Dept: PHARMACY | Facility: MEDICAL CENTER | Age: 54
End: 2024-10-31
Payer: COMMERCIAL

## 2024-11-13 ENCOUNTER — APPOINTMENT (OUTPATIENT)
Dept: MEDICAL GROUP | Facility: MEDICAL CENTER | Age: 54
End: 2024-11-13
Payer: COMMERCIAL

## 2024-12-18 ENCOUNTER — APPOINTMENT (OUTPATIENT)
Dept: MEDICAL GROUP | Facility: MEDICAL CENTER | Age: 54
End: 2024-12-18
Payer: COMMERCIAL

## 2025-01-14 ENCOUNTER — APPOINTMENT (OUTPATIENT)
Dept: MEDICAL GROUP | Age: 55
End: 2025-01-14
Payer: COMMERCIAL

## 2025-01-14 VITALS
HEIGHT: 63 IN | WEIGHT: 172 LBS | SYSTOLIC BLOOD PRESSURE: 124 MMHG | DIASTOLIC BLOOD PRESSURE: 78 MMHG | HEART RATE: 90 BPM | TEMPERATURE: 97.1 F | RESPIRATION RATE: 16 BRPM | OXYGEN SATURATION: 97 % | BODY MASS INDEX: 30.48 KG/M2

## 2025-01-14 DIAGNOSIS — E66.09 CLASS 1 OBESITY DUE TO EXCESS CALORIES WITH BODY MASS INDEX (BMI) OF 30.0 TO 30.9 IN ADULT, UNSPECIFIED WHETHER SERIOUS COMORBIDITY PRESENT: ICD-10-CM

## 2025-01-14 DIAGNOSIS — I10 ESSENTIAL HYPERTENSION: Chronic | ICD-10-CM

## 2025-01-14 DIAGNOSIS — E66.09 CLASS 1 OBESITY DUE TO EXCESS CALORIES WITH SERIOUS COMORBIDITY AND BODY MASS INDEX (BMI) OF 30.0 TO 30.9 IN ADULT: ICD-10-CM

## 2025-01-14 DIAGNOSIS — E66.811 CLASS 1 OBESITY DUE TO EXCESS CALORIES WITH BODY MASS INDEX (BMI) OF 30.0 TO 30.9 IN ADULT, UNSPECIFIED WHETHER SERIOUS COMORBIDITY PRESENT: ICD-10-CM

## 2025-01-14 DIAGNOSIS — Z78.0 MENOPAUSE: ICD-10-CM

## 2025-01-14 DIAGNOSIS — Z12.11 COLON CANCER SCREENING: ICD-10-CM

## 2025-01-14 DIAGNOSIS — E66.811 CLASS 1 OBESITY DUE TO EXCESS CALORIES WITH SERIOUS COMORBIDITY AND BODY MASS INDEX (BMI) OF 30.0 TO 30.9 IN ADULT: ICD-10-CM

## 2025-01-14 DIAGNOSIS — R23.2 HOT FLASHES: ICD-10-CM

## 2025-01-14 DIAGNOSIS — E06.3 HYPOTHYROIDISM DUE TO HASHIMOTO'S THYROIDITIS: Chronic | ICD-10-CM

## 2025-01-14 DIAGNOSIS — K22.70 BARRETT'S ESOPHAGUS DETERMINED BY ENDOSCOPY: ICD-10-CM

## 2025-01-14 PROCEDURE — 3074F SYST BP LT 130 MM HG: CPT | Performed by: INTERNAL MEDICINE

## 2025-01-14 PROCEDURE — 3078F DIAST BP <80 MM HG: CPT | Performed by: INTERNAL MEDICINE

## 2025-01-14 PROCEDURE — 99214 OFFICE O/P EST MOD 30 MIN: CPT | Performed by: INTERNAL MEDICINE

## 2025-01-14 PROCEDURE — RXMED WILLOW AMBULATORY MEDICATION CHARGE: Performed by: INTERNAL MEDICINE

## 2025-01-14 RX ORDER — LISINOPRIL AND HYDROCHLOROTHIAZIDE 12.5; 2 MG/1; MG/1
1 TABLET ORAL 2 TIMES DAILY
Qty: 180 TABLET | Refills: 2 | Status: SHIPPED | OUTPATIENT
Start: 2025-01-14

## 2025-01-14 RX ORDER — THYROID,PORK 90 MG
90 TABLET ORAL DAILY
Qty: 90 TABLET | Refills: 3 | Status: SHIPPED | OUTPATIENT
Start: 2025-01-14

## 2025-01-14 RX ORDER — VENLAFAXINE HYDROCHLORIDE 37.5 MG/1
37.5 CAPSULE, EXTENDED RELEASE ORAL DAILY
Qty: 90 CAPSULE | Refills: 3 | Status: SHIPPED | OUTPATIENT
Start: 2025-01-14

## 2025-01-14 ASSESSMENT — PATIENT HEALTH QUESTIONNAIRE - PHQ9
CLINICAL INTERPRETATION OF PHQ2 SCORE: 2
SUM OF ALL RESPONSES TO PHQ QUESTIONS 1-9: 4
5. POOR APPETITE OR OVEREATING: 0 - NOT AT ALL

## 2025-01-14 ASSESSMENT — FIBROSIS 4 INDEX: FIB4 SCORE: 0.59

## 2025-01-14 ASSESSMENT — ENCOUNTER SYMPTOMS: WEIGHT LOSS: 0

## 2025-01-14 NOTE — PROGRESS NOTES
CC:   Chief Complaint   Patient presents with    Medication Refill     Pantoprazole, lisinopril, armor thyroid.     Menopause     Diagnoses of Essential hypertension, Menopause, Hypothyroidism due to Hashimoto's thyroiditis, Mcneil's esophagus determined by endoscopy, Colon cancer screening, Class 1 obesity due to excess calories with body mass index (BMI) of 30.0 to 30.9 in adult, unspecified whether serious comorbidity present, and Class 1 obesity due to excess calories with serious comorbidity and body mass index (BMI) of 30.0 to 30.9 in adult were pertinent to this visit.  Verbal consent was acquired by the patient to use [email or web address] ambient listening note generation during this visit Yes     HPI: Nancie is a pleasant 54 y.o. female who presents today to discuss the following problems:     History of Present Illness  The patient is a pleasant 54-year-old female presenting for evaluation of menopause symptoms, hypothyroidism, hypertension, Mcneil's esophagus, and weight management.    She reports experiencing severe hot flashes, which have been ongoing for several years but have recently intensified. These episodes occur throughout the day, with increased severity at night, often necessitating changes in clothing. She also describes a heightened emotional state over the past 3 months, characterized by irritability and frequent tearfulness. She has expressed interest in exploring non-hormonal treatment options, such as creams, to alleviate her worsening symptoms. She has expressed concerns about potential cardiac risks associated with low estrogen levels, as she is unable to undergo hormone replacement therapy due to her history of blood clots. She underwent a hysterectomy, retaining her ovaries, and was diagnosed with menopause in 2021 following laboratory tests.    She has a history of hypothyroidism. She is taking [place] Thyroid 90 mg daily.    She has a history of hypertension. She is currently taking  lisinopril-hydrochlorothiazide 20-12.5 mg daily. Her blood pressure has been well-controlled, which she monitors at least 3 times daily.    She has a history of Mcneil's esophagus. She needs to follow up with GI. She continues to take pantoprazole daily. She has been unsuccessful in scheduling an appointment with [facility], despite being placed on a waiting list. She has not yet undergone a colonoscopy. Her most recent upper endoscopy was performed in 2017 or 2018.    She has been using semaglutide, obtained from a [facility] pharmacy, for approximately 4 months without any noticeable benefits. She has lost 5 pounds during this period.    MEDICATIONS  Current: [place] thyroid, lisinopril-hydrochlorothiazide, pantoprazole, semaglutide    Past Medical History:   Diagnosis Date    Disorder of thyroid     GERD (gastroesophageal reflux disease)     Gynecological disorder     heavy periods    Hypertension     Indigestion     Perioral dermatitis 10/30/2018    Subcutaneous cyst 6/20/2019       Current Outpatient Medications Ordered in Epic   Medication Sig Dispense Refill    ARMOUR THYROID 90 MG Tab Take 1 Tablet by mouth every day. 90 Tablet 3    lisinopril-hydrochlorothiazide (PRINZIDE) 20-12.5 MG per tablet Take 1 Tablet by mouth 2 times a day. 180 Tablet 2    venlafaxine XR (EFFEXOR XR) 37.5 MG CAPSULE SR 24 HR Take 1 Capsule by mouth every day. 90 Capsule 3    Tirzepatide-Weight Management 2.5 MG/0.5ML Solution Inject 2.5 mg under the skin every 7 days. 2 mL 0    pantoprazole (PROTONIX) 40 MG Tablet Delayed Response Take 1 Tablet by mouth every day. 90 Tablet 0    albuterol (PROVENTIL) 2.5mg/3ml Nebu Soln solution for nebulization 3 mL by Nebulization route every four hours as needed for Shortness of Breath. 120 mL 11     No current [facility]-ordered facility-administered medications on file.     Review of Systems   Constitutional:  Negative for malaise/fatigue and weight loss.   Psychiatric/Behavioral:           "Positive for mood swings, irritability       Objective:     Exam:  /78 (BP Location: Right arm, Patient Position: Sitting, BP Cuff Size: Adult)   Pulse 90   Temp 36.2 °C (97.1 °F) (Temporal)   Resp 16   Ht 1.6 m (5' 3\")   Wt 78 kg (172 lb)   LMP 08/16/2016   SpO2 97%   BMI 30.47 kg/m²  Body mass index is 30.47 kg/m².    Physical Exam  Constitutional:       Appearance: Normal appearance.   HENT:      Head: Normocephalic and atraumatic.   Pulmonary:      Effort: Pulmonary effort is normal. No respiratory distress.   Neurological:      Mental Status: She is alert.   Psychiatric:         Mood and Affect: Mood normal.         Behavior: Behavior normal.         Thought Content: Thought content normal.         Judgment: Judgment normal.       Results:  Results  Lab Results   Component Value Date/Time    CHOLSTRLTOT 197 07/29/2024 07:51 AM     (H) 07/29/2024 07:51 AM    HDL 37 (A) 07/29/2024 07:51 AM    TRIGLYCERIDE 205 (H) 07/29/2024 07:51 AM       Lab Results   Component Value Date/Time    SODIUM 140 07/29/2024 07:51 AM    POTASSIUM 3.8 07/29/2024 07:51 AM    CHLORIDE 103 07/29/2024 07:51 AM    CO2 23 07/29/2024 07:51 AM    GLUCOSE 110 (H) 07/29/2024 07:51 AM    BUN 16 07/29/2024 07:51 AM    CREATININE 0.76 07/29/2024 07:51 AM    CREATININE 0.8 04/16/2008 06:06 AM     Lab Results   Component Value Date/Time    ALKPHOSPHAT 92 07/29/2024 07:51 AM    ASTSGOT 24 07/29/2024 07:51 AM    ALTSGPT 36 07/29/2024 07:51 AM    TBILIRUBIN 0.5 07/29/2024 07:51 AM      Last TSH:   Lab Results   Component Value Date/Time    TSHULTRASEN 1.430 07/29/2024 0751        Assessment & Plan:   Nancie  is a pleasant 54 y.o. female with the following -     Assessment & Plan  1. Hot flashes.  She reports severe hot flashes and mood changes over the past 3 months, which have worsened significantly. She has a history of blood clots, making hormone replacement therapy risky. Nonhormonal medications from the antidepressant group were " discussed as a potential treatment option. Venlafaxine was prescribed, with instructions to take 1 tablet daily. If the medication proves effective but does not fully alleviate symptoms, the dosage can be increased to 2 tablets daily. A referral to an endocrinologist at [facility] was made for further hormonal management. Thyroid levels will be checked to rule out any contribution to her hot flashes.    2. Hypothyroidism.  She is currently taking [place] Thyroid 90 mg daily. Her last thyroid levels were within normal limits. Thyroid levels will be rechecked to ensure they are not contributing to her menopausal symptoms.    3. Hypertension.  Her blood pressure is well-controlled on her current regimen of lisinopril-hydrochlorothiazide 20-12.5 mg 2 tablets daily. She was advised to continue her current medication and monitoring routine.    4. Mcneil's esophagus.  She is taking pantoprazole daily. A referral to [facility] was made for an upper endoscopy and colonoscopy, as her last endoscopy was in 2017 or 2018, and she has never had a colonoscopy.    5. Obesity  She has been using compounded semaglutide without success. A prescription for Zepbound was provided, with instructions to start at the lowest dose of 2.5 mg weekly for the first 4 weeks. If effective, she can continue this dosage indefinitely. She was advised to maintain a healthy diet and regular exercise regimen while on this medication.     Problem List Items Addressed This Visit       Mcneil's esophagus determined by endoscopy    Relevant Orders    Referral to Gastroenterology    Class 1 obesity due to excess calories with serious comorbidity and body mass index (BMI) of 30.0 to 30.9 in adult    Relevant Medications    Tirzepatide-Weight Management 2.5 MG/0.5ML Solution    Essential hypertension (Chronic)    Relevant Medications    lisinopril-hydrochlorothiazide (PRINZIDE) 20-12.5 MG per tablet    Tirzepatide-Weight Management 2.5 MG/0.5ML  Solution    Hypothyroidism due to Hashimoto's thyroiditis (Chronic)    Relevant Medications    ARMOUR THYROID 90 MG Tab    venlafaxine XR (EFFEXOR XR) 37.5 MG CAPSULE SR 24 HR    Other Relevant Orders    TSH WITH REFLEX TO FT4    Referral to Endocrinology     Other Visit Diagnoses       Menopause        Relevant Medications    venlafaxine XR (EFFEXOR XR) 37.5 MG CAPSULE SR 24 HR    Other Relevant Orders    Referral to Endocrinology    Colon cancer screening        Relevant Orders    Referral to Gastroenterology    Class 1 obesity due to excess calories with body mass index (BMI) of 30.0 to 30.9 in adult, unspecified whether serious comorbidity present        Relevant Medications    Tirzepatide-Weight Management 2.5 MG/0.5ML Solution          No follow-ups on file. Follow-up  The patient will follow up in 2 months.    Please note that this dictation was created using voice recognition software. I have made every reasonable attempt to correct obvious errors, but I expect that there are errors of grammar and possibly content that I did not discover before finalizing the note.

## 2025-01-15 ENCOUNTER — PHARMACY VISIT (OUTPATIENT)
Dept: PHARMACY | Facility: MEDICAL CENTER | Age: 55
End: 2025-01-15
Payer: COMMERCIAL

## 2025-01-16 ENCOUNTER — APPOINTMENT (OUTPATIENT)
Dept: MEDICAL GROUP | Age: 55
End: 2025-01-16
Payer: COMMERCIAL

## 2025-01-27 ENCOUNTER — HOSPITAL ENCOUNTER (OUTPATIENT)
Dept: RADIOLOGY | Facility: MEDICAL CENTER | Age: 55
End: 2025-01-27
Attending: INTERNAL MEDICINE
Payer: COMMERCIAL

## 2025-01-27 DIAGNOSIS — Z12.31 VISIT FOR SCREENING MAMMOGRAM: ICD-10-CM

## 2025-01-27 PROCEDURE — 77067 SCR MAMMO BI INCL CAD: CPT

## 2025-02-18 ENCOUNTER — PHARMACY VISIT (OUTPATIENT)
Dept: PHARMACY | Facility: MEDICAL CENTER | Age: 55
End: 2025-02-18
Payer: COMMERCIAL

## 2025-02-18 PROCEDURE — RXMED WILLOW AMBULATORY MEDICATION CHARGE

## 2025-02-18 RX ORDER — POLYETHYLENE GLYCOL 3350, SODIUM SULFATE ANHYDROUS, SODIUM BICARBONATE, SODIUM CHLORIDE, POTASSIUM CHLORIDE 236; 22.74; 6.74; 5.86; 2.97 G/4L; G/4L; G/4L; G/4L; G/4L
POWDER, FOR SOLUTION ORAL
Qty: 4000 ML | Refills: 0 | OUTPATIENT
Start: 2025-02-18

## 2025-02-18 RX ORDER — PANTOPRAZOLE SODIUM 40 MG/1
TABLET, DELAYED RELEASE ORAL
Qty: 30 TABLET | Refills: 12 | OUTPATIENT
Start: 2025-02-18

## 2025-02-18 RX ORDER — FAMOTIDINE 40 MG/1
TABLET, FILM COATED ORAL
Qty: 30 TABLET | Refills: 6 | OUTPATIENT
Start: 2025-02-18

## 2025-02-26 ENCOUNTER — APPOINTMENT (OUTPATIENT)
Dept: MEDICAL GROUP | Age: 55
End: 2025-02-26
Payer: COMMERCIAL

## 2025-03-06 NOTE — Clinical Note
Member Name: Nancie Ugalde   Member Number: [id number]   Reference Number: [id number]   Approved Services: Outpatient Surgery   Approved Service Dates: 03/06/2025 - 06/06/2025   Requesting Provider: Brennan Fernandez   Requested Provider: [facility]     Dear Nancie Petra Ugalde:     The following medical service(s) requested by Brennan Fernandez have been approved:    Procedure Code Procedure Code Name Requested Quantity Approved Quantity Status   06706 (CPT®) OR ANESTH,INTESTINE,SCOPE UPPER/LOW 1 1 Authorized   94228 (CPT®) OR UPPER GI ENDOSCOPY,DIAGNOSIS 1 1 Authorized   49376 (CPT®) OR UPPER GI ENDOSCOPY,BIOPSY 1 1 Authorized   69402 (CPT®) OR COLONOSCOPY-FLEXIBLE 1 1 Authorized   93498 (CPT®) OR COLONOSCOPY,BIOPSY 1 1 Authorized   56364 (CPT®) OR COLONOSCOPY,REMV LESN,SNARE 1 1 Authorized    OR COLORECTAL SCRN  HI RISK IND 1 1 Authorized    OR COLON CA SCRN NOT HI RSK IND 1 1 Authorized       Approved Quantity means the number of visits approved for medication treatments and/or medical services.    The services should be provided by [facility] no later than 06/06/2025. Please contact the provider listed below with any questions.     Provider Information:  [facility]  [phone]    Your plan benefit may require a deductible, co-payment or coinsurance for these services. This authorization does not guarantee [facility] will pay the claim for services that you receive. Payment by [facility] for these services is subject to the terms of your Evidence of Coverage or Summary Plan Description, your eligibility at the time of service, and confirmation of benefit coverage.    For any questions or additional information, please contact Customer Service:    [facility]  Customer Service: [phone] or toll free [phone]  TTY users dial: [phone]   Call Center Hours: Mon - Fri 7 AM to 8 PM PST   Office Hours: Mon - Fri 8 AM to 5 PM PST   E-mail:  [email or web address]   Website: [email or web address]       This information is available for free in other languages. Please contact Customer Service at the phone number above for more information. [facility] complies with applicable Federal civil rights laws and does not discriminate on the basis of race, color, national origin, age, disability or sex.      Sincerely,     Healthcare Utilization Management Department     Cc: [facility]   Brennan Fernandez

## 2025-03-10 ENCOUNTER — APPOINTMENT (OUTPATIENT)
Dept: MEDICAL GROUP | Age: 55
End: 2025-03-10
Payer: COMMERCIAL

## 2025-03-13 PROCEDURE — RXMED WILLOW AMBULATORY MEDICATION CHARGE: Performed by: HEALTH EDUCATOR

## 2025-03-19 ENCOUNTER — HOSPITAL ENCOUNTER (OUTPATIENT)
Dept: LAB | Facility: MEDICAL CENTER | Age: 55
End: 2025-03-19
Attending: HEALTH EDUCATOR
Payer: COMMERCIAL

## 2025-03-19 LAB
25(OH)D3 SERPL-MCNC: 53 NG/ML (ref 30–100)
ESTRADIOL SERPL-MCNC: 13.1 PG/ML
FSH SERPL-ACNC: 37.7 MIU/ML
LH SERPL-ACNC: 21.6 IU/L
T3FREE SERPL-MCNC: 3.31 PG/ML (ref 2–4.4)
T4 FREE SERPL-MCNC: 1.19 NG/DL (ref 0.93–1.7)
THYROPEROXIDASE AB SERPL-ACNC: 11.6 IU/ML (ref 0–9)
TSH SERPL-ACNC: 0.32 UIU/ML (ref 0.35–5.5)
VIT B12 SERPL-MCNC: 1017 PG/ML (ref 211–911)

## 2025-03-19 PROCEDURE — 84270 ASSAY OF SEX HORMONE GLOBUL: CPT

## 2025-03-19 PROCEDURE — 83002 ASSAY OF GONADOTROPIN (LH): CPT

## 2025-03-19 PROCEDURE — 83001 ASSAY OF GONADOTROPIN (FSH): CPT

## 2025-03-19 PROCEDURE — 36415 COLL VENOUS BLD VENIPUNCTURE: CPT

## 2025-03-19 PROCEDURE — 84443 ASSAY THYROID STIM HORMONE: CPT

## 2025-03-19 PROCEDURE — 86376 MICROSOMAL ANTIBODY EACH: CPT

## 2025-03-19 PROCEDURE — 84403 ASSAY OF TOTAL TESTOSTERONE: CPT

## 2025-03-19 PROCEDURE — 82306 VITAMIN D 25 HYDROXY: CPT

## 2025-03-19 PROCEDURE — 84481 FREE ASSAY (FT-3): CPT

## 2025-03-19 PROCEDURE — 82670 ASSAY OF TOTAL ESTRADIOL: CPT

## 2025-03-19 PROCEDURE — 82607 VITAMIN B-12: CPT

## 2025-03-19 PROCEDURE — RXMED WILLOW AMBULATORY MEDICATION CHARGE

## 2025-03-19 PROCEDURE — 84439 ASSAY OF FREE THYROXINE: CPT

## 2025-03-21 LAB — SHBG SERPL-SCNC: 29 NMOL/L (ref 17–125)

## 2025-03-24 ENCOUNTER — PHARMACY VISIT (OUTPATIENT)
Dept: PHARMACY | Facility: MEDICAL CENTER | Age: 55
End: 2025-03-24
Payer: COMMERCIAL

## 2025-03-24 LAB — TESTOST SERPL-MCNC: 13 NG/DL (ref 9–55)

## 2025-04-02 ENCOUNTER — PHARMACY VISIT (OUTPATIENT)
Dept: PHARMACY | Facility: MEDICAL CENTER | Age: 55
End: 2025-04-02
Payer: COMMERCIAL

## 2025-04-02 PROCEDURE — RXOTC WILLOW AMBULATORY OTC CHARGE

## 2025-04-16 PROCEDURE — RXMED WILLOW AMBULATORY MEDICATION CHARGE

## 2025-04-21 ENCOUNTER — PHARMACY VISIT (OUTPATIENT)
Dept: PHARMACY | Facility: MEDICAL CENTER | Age: 55
End: 2025-04-21
Payer: COMMERCIAL

## 2025-04-22 PROCEDURE — RXMED WILLOW AMBULATORY MEDICATION CHARGE: Performed by: INTERNAL MEDICINE

## 2025-04-25 ENCOUNTER — PHARMACY VISIT (OUTPATIENT)
Dept: PHARMACY | Facility: MEDICAL CENTER | Age: 55
End: 2025-04-25
Payer: COMMERCIAL

## 2025-05-02 PROCEDURE — RXMED WILLOW AMBULATORY MEDICATION CHARGE: Performed by: HEALTH EDUCATOR

## 2025-05-04 ENCOUNTER — PHARMACY VISIT (OUTPATIENT)
Dept: PHARMACY | Facility: MEDICAL CENTER | Age: 55
End: 2025-05-04
Payer: COMMERCIAL

## 2025-05-29 ENCOUNTER — APPOINTMENT (OUTPATIENT)
Dept: MEDICAL GROUP | Age: 55
End: 2025-05-29
Payer: COMMERCIAL

## 2025-05-29 NOTE — PROGRESS NOTES
CC: No chief complaint on file.    There were no encounter diagnoses.  Verbal consent was acquired by the patient to use [employer] ambient listening note generation during this visit {YES/NO:615170}    HPI: Nancie is a pleasant 54 y.o. female who presents today to discuss the following problems:     History of Present Illness      Past Medical History[1]    Current Medications and Prescriptions Ordered in [facility][2]    Health Maintenance: {COMPLETED:518501}    ROS    Objective:     Exam:  LMP 08/16/2016  There is no height or weight on file to calculate BMI.    Physical Exam  A chaperone was offered to the patient during today's exam. {CHAPERONE:39162}    Results:  Results      Assessment & Plan:   Nancie  is a pleasant 54 y.o. female with the following -     Assessment & Plan    Problem List Items Addressed This Visit    None        I spent a total of *** minutes with record review, exam, communication with the patient, communication with other providers, and documentation of this encounter.    No follow-ups on file.    Please note that this dictation was created using voice recognition software. I have made every reasonable attempt to correct obvious errors, but I expect that there are errors of grammar and possibly content that I did not discover before finalizing the note.         [1]  Past Medical History:  Diagnosis Date   • Disorder of thyroid    • GERD (gastroesophageal reflux disease)    • Gynecological disorder     heavy periods   • Hypertension    • Indigestion    • Perioral dermatitis 10/30/2018   • Subcutaneous cyst 6/20/2019   [2]  Current Outpatient Medications Ordered in Epic   Medication Sig Dispense Refill   • Estradiol 0.025 MG/24HR PATCH BIWEEKLY Place 1 patch to skin transdermally 2 times a Week for 30 days 8 Patch 3   • polyethylene glycol-electrolytes (GOLYTELY) 236 GM Recon Soln Take as directed by office. 4000 mL 0   • famotidine (PEPCID) 40 MG Tab Take 1 tablet by mouth every night at  bedtime as needed 30 Tablet 6   • pantoprazole (PROTONIX) 40 MG Tablet Delayed Response take one tablet by mouth once a day. 30 Tablet 12   • ARMOUR THYROID 90 MG Tab Take 1 Tablet by mouth every day. 90 Tablet 3   • lisinopril-hydrochlorothiazide (PRINZIDE) 20-12.5 MG per tablet Take 1 Tablet by mouth 2 times a day. 180 Tablet 2   • venlafaxine XR (EFFEXOR XR) 37.5 MG CAPSULE SR 24 HR Take 1 Capsule by mouth every day. 90 Capsule 3   • Tirzepatide-Weight Management 2.5 MG/0.5ML Solution Inject 2.5 mg under the skin every 7 days. 2 mL 0   • pantoprazole (PROTONIX) 40 MG Tablet Delayed Response Take 1 Tablet by mouth every day. 90 Tablet 0   • albuterol (PROVENTIL) 2.5mg/3ml Nebu Soln solution for nebulization 3 mL by Nebulization route every four hours as needed for Shortness of Breath. 120 mL 11     No current Epic-ordered facility-administered medications on file.

## 2025-06-02 ENCOUNTER — APPOINTMENT (OUTPATIENT)
Dept: MEDICAL GROUP | Age: 55
End: 2025-06-02
Payer: COMMERCIAL

## 2025-06-04 PROCEDURE — RXMED WILLOW AMBULATORY MEDICATION CHARGE

## 2025-06-04 PROCEDURE — RXMED WILLOW AMBULATORY MEDICATION CHARGE: Performed by: HEALTH EDUCATOR

## 2025-06-09 ENCOUNTER — PHARMACY VISIT (OUTPATIENT)
Dept: PHARMACY | Facility: MEDICAL CENTER | Age: 55
End: 2025-06-09
Payer: COMMERCIAL

## 2025-06-09 ENCOUNTER — APPOINTMENT (OUTPATIENT)
Dept: MEDICAL GROUP | Age: 55
End: 2025-06-09
Payer: COMMERCIAL

## 2025-06-18 ENCOUNTER — APPOINTMENT (OUTPATIENT)
Dept: MEDICAL GROUP | Age: 55
End: 2025-06-18
Payer: COMMERCIAL

## 2025-06-23 ENCOUNTER — APPOINTMENT (OUTPATIENT)
Dept: MEDICAL GROUP | Age: 55
End: 2025-06-23
Payer: COMMERCIAL

## 2025-06-30 PROCEDURE — RXMED WILLOW AMBULATORY MEDICATION CHARGE: Performed by: HEALTH EDUCATOR

## 2025-07-02 PROCEDURE — RXMED WILLOW AMBULATORY MEDICATION CHARGE

## 2025-07-03 ENCOUNTER — PHARMACY VISIT (OUTPATIENT)
Dept: PHARMACY | Facility: MEDICAL CENTER | Age: 55
End: 2025-07-03
Payer: COMMERCIAL

## 2025-08-10 DIAGNOSIS — K21.00 GASTROESOPHAGEAL REFLUX DISEASE WITH ESOPHAGITIS: ICD-10-CM

## 2025-08-10 DIAGNOSIS — K22.70 BARRETT'S ESOPHAGUS DETERMINED BY ENDOSCOPY: ICD-10-CM

## 2025-08-10 RX ORDER — PANTOPRAZOLE SODIUM 40 MG/1
40 TABLET, DELAYED RELEASE ORAL
Qty: 90 TABLET | Refills: 0 | Status: CANCELLED | OUTPATIENT
Start: 2025-08-10

## 2025-08-12 PROCEDURE — RXMED WILLOW AMBULATORY MEDICATION CHARGE

## 2025-08-12 PROCEDURE — RXMED WILLOW AMBULATORY MEDICATION CHARGE: Performed by: INTERNAL MEDICINE

## 2025-08-13 ENCOUNTER — PHARMACY VISIT (OUTPATIENT)
Dept: PHARMACY | Facility: MEDICAL CENTER | Age: 55
End: 2025-08-13
Payer: COMMERCIAL

## 2025-08-20 ENCOUNTER — PHARMACY VISIT (OUTPATIENT)
Dept: PHARMACY | Facility: MEDICAL CENTER | Age: 55
End: 2025-08-20
Payer: COMMERCIAL

## 2025-08-20 PROCEDURE — RXMED WILLOW AMBULATORY MEDICATION CHARGE: Performed by: HEALTH EDUCATOR

## 2025-08-20 RX ORDER — ESTRADIOL 0.03 MG/D
FILM, EXTENDED RELEASE TRANSDERMAL
Qty: 8 PATCH | Refills: 1 | OUTPATIENT
Start: 2025-08-20

## 2025-08-26 ENCOUNTER — EH NON-PROVIDER (OUTPATIENT)
Dept: OCCUPATIONAL MEDICINE | Facility: CLINIC | Age: 55
End: 2025-08-26

## 2025-08-26 PROCEDURE — 94375 RESPIRATORY FLOW VOLUME LOOP: CPT | Performed by: PREVENTIVE MEDICINE

## 2025-08-28 ENCOUNTER — HOSPITAL ENCOUNTER (OUTPATIENT)
Dept: LAB | Facility: MEDICAL CENTER | Age: 55
End: 2025-08-28
Attending: HEALTH EDUCATOR
Payer: COMMERCIAL

## 2025-08-28 LAB
ESTRADIOL SERPL-MCNC: 22.6 PG/ML
FSH SERPL-ACNC: 27.5 MIU/ML
LH SERPL-ACNC: 16.4 IU/L

## 2025-08-28 PROCEDURE — 36415 COLL VENOUS BLD VENIPUNCTURE: CPT

## 2025-08-28 PROCEDURE — 83002 ASSAY OF GONADOTROPIN (LH): CPT

## 2025-08-28 PROCEDURE — 82670 ASSAY OF TOTAL ESTRADIOL: CPT

## 2025-08-28 PROCEDURE — 84403 ASSAY OF TOTAL TESTOSTERONE: CPT

## 2025-08-28 PROCEDURE — 84270 ASSAY OF SEX HORMONE GLOBUL: CPT

## 2025-08-28 PROCEDURE — 83001 ASSAY OF GONADOTROPIN (FSH): CPT

## 2025-08-30 LAB — SHBG SERPL-SCNC: 26 NMOL/L (ref 17–125)

## 2025-09-11 ENCOUNTER — APPOINTMENT (OUTPATIENT)
Dept: MEDICAL GROUP | Age: 55
End: 2025-09-11
Payer: COMMERCIAL